# Patient Record
Sex: MALE | Race: WHITE | Employment: OTHER | URBAN - METROPOLITAN AREA
[De-identification: names, ages, dates, MRNs, and addresses within clinical notes are randomized per-mention and may not be internally consistent; named-entity substitution may affect disease eponyms.]

---

## 2019-07-08 ENCOUNTER — OFFICE VISIT (OUTPATIENT)
Dept: URGENT CARE | Facility: CLINIC | Age: 69
End: 2019-07-08
Payer: MEDICARE

## 2019-07-08 VITALS
SYSTOLIC BLOOD PRESSURE: 128 MMHG | HEART RATE: 72 BPM | WEIGHT: 219 LBS | OXYGEN SATURATION: 95 % | BODY MASS INDEX: 32.44 KG/M2 | RESPIRATION RATE: 18 BRPM | DIASTOLIC BLOOD PRESSURE: 80 MMHG | TEMPERATURE: 99 F | HEIGHT: 69 IN

## 2019-07-08 DIAGNOSIS — B02.31 HERPES ZOSTER CONJUNCTIVITIS: Primary | ICD-10-CM

## 2019-07-08 PROCEDURE — 99204 OFFICE O/P NEW MOD 45 MIN: CPT | Performed by: PHYSICIAN ASSISTANT

## 2019-07-08 RX ORDER — VALACYCLOVIR HYDROCHLORIDE 1 G/1
1000 TABLET, FILM COATED ORAL 3 TIMES DAILY
Qty: 21 TAB | Refills: 0 | Status: SHIPPED | OUTPATIENT
Start: 2019-07-08 | End: 2019-07-15

## 2019-07-08 RX ORDER — PREDNISONE 20 MG/1
TABLET ORAL
Qty: 23 TAB | Refills: 0 | Status: SHIPPED | OUTPATIENT
Start: 2019-07-08 | End: 2020-12-09

## 2019-07-08 RX ORDER — ATORVASTATIN CALCIUM 10 MG/1
10 TABLET, FILM COATED ORAL NIGHTLY
COMMUNITY
End: 2019-10-24

## 2019-07-08 RX ORDER — CEFUROXIME AXETIL 250 MG/1
250 TABLET ORAL 2 TIMES DAILY
COMMUNITY
End: 2020-12-09

## 2019-07-08 NOTE — PATIENT INSTRUCTIONS
Shingles  Shingles is an infection that causes a painful skin rash and fluid-filled blisters. Shingles is caused by the same virus that causes chickenpox.  Shingles only develops in people who:  · Have had chickenpox.  · Have gotten the chickenpox vaccine. (This is rare.)  The first symptoms of shingles may be itching, tingling, or pain in an area on your skin. A rash will follow in a few days or weeks. The rash is usually on one side of the body in a bandlike or beltlike pattern. Over time, the rash turns into fluid-filled blisters that break open, scab over, and dry up. Medicines may:  · Help you manage pain.  · Help you recover more quickly.  · Help to prevent long-term problems.  Follow these instructions at home:  Medicines  · Take medicines only as told by your doctor.  · Apply an anti-itch or numbing cream to the affected area as told by your doctor.  Blister and Rash Care  · Take a cool bath or put cool compresses on the area of the rash or blisters as told by your doctor. This may help with pain and itching.  · Keep your rash covered with a loose bandage (dressing). Wear loose-fitting clothing.  · Keep your rash and blisters clean with mild soap and cool water or as told by your doctor.  · Check your rash every day for signs of infection. These include redness, swelling, and pain that lasts or gets worse.  · Do not pick your blisters.  · Do not scratch your rash.  General instructions  · Rest as told by your doctor.  · Keep all follow-up visits as told by your doctor. This is important.  · Until your blisters scab over, your infection can cause chickenpox in people who have never had it or been vaccinated against it. To prevent this from happening, avoid touching other people or being around other people, especially:  ¨ Babies.  ¨ Pregnant women.  ¨ Children who have eczema.  ¨ Elderly people who have transplants.  ¨ People who have chronic illnesses, such as leukemia or AIDS.  Contact a doctor if:  · Your  pain does not get better with medicine.  · Your pain does not get better after the rash heals.  · Your rash looks infected. Signs of infection include:  ¨ Redness.  ¨ Swelling.  ¨ Pain that lasts or gets worse.  Get help right away if:  · The rash is on your face or nose.  · You have pain in your face, pain around your eye area, or loss of feeling on one side of your face.  · You have ear pain or you have ringing in your ear.  · You have loss of taste.  · Your condition gets worse.  This information is not intended to replace advice given to you by your health care provider. Make sure you discuss any questions you have with your health care provider.  Document Released: 06/05/2009 Document Revised: 08/13/2017 Document Reviewed: 09/29/2015  [employer] Interactive Patient Education © 2017 [employer]

## 2019-07-08 NOTE — PROGRESS NOTES
Subjective:      Pt is a 69 y.o. male who presents with Allergic Reaction (Cuple days rash on forehead and scalp)            HPI  This is a new problem. Pt notes red rash on left forehead and scalp x 3 days which burns and is affecting his left eye. Pt states the pain before the rash was 5 days ago and he took antibiotics for an ear infection and was thinking this rash was due to an allergy to the medication though he has had the meds in the past without issue. Pt has not taken any Rx medications for this condition. Pt states the pain is a 6/10, aching in nature and worse at night. Pt denies new detergents, soaps, make-up, hygiene products, foods, exposure to chemicals.  Pt denies CP, SOB, NVD, paresthesias, headaches, dizziness, change in vision, hives, or other joint pain. The pt's medication list, problem list, and allergies have been evaluated and reviewed during today's visit.    PMH:  Negative per pt.      PSH:  Negative per pt.      Fam Hx:  the patient's family history is not pertinent to their current complaint    Soc HX:  Social History     Social History   • Marital status:      Spouse name: N/A   • Number of children: N/A   • Years of education: N/A     Occupational History   • Not on file.     Social History Main Topics   • Smoking status: Former Smoker     Start date: 1/8/1970   • Smokeless tobacco: Never Used   • Alcohol use Not on file   • Drug use: Unknown   • Sexual activity: Not on file     Other Topics Concern   • Not on file     Social History Narrative   • No narrative on file         Medications:    Current Outpatient Prescriptions:   •  cefUROXime (CEFTIN) 250 MG Tab, Take 250 mg by mouth 2 times a day., Disp: , Rfl:   •  atorvastatin (LIPITOR) 10 MG Tab, Take 10 mg by mouth every evening., Disp: , Rfl:   •  predniSONE (DELTASONE) 20 MG Tab, Take 3 tabs at once PO daily x 5 days, then take 2 tabs at once daily x 3 days, then take 1 tab PO daily x 2 days, Disp: 23 Tab, Rfl: 0  •   "valacyclovir (VALTREX) 1 GM Tab, Take 1 Tab by mouth 3 times a day for 7 days., Disp: 21 Tab, Rfl: 0      Allergies:  Patient has no known allergies.    ROS    Constitutional: Negative for fever, chills and malaise/fatigue.   HENT: Negative for congestion and sore throat.    Eyes: Negative for blurred vision, double vision and photophobia.   Respiratory: Negative for cough and shortness of breath.  Cardiovascular: Negative for chest pain and palpitations.   Gastrointestinal: Negative for heartburn, nausea, vomiting, abdominal pain, diarrhea and constipation.   Genitourinary: Negative for dysuria and flank pain.   Musculoskeletal: Negative for joint pain and myalgias.   Skin: +left forehead and scalp red rash  Neurological: Negative for dizziness, tingling and headaches.   Endo/Heme/Allergies: Does not bruise/bleed easily.   Psychiatric/Behavioral: Negative for depression. The patient is not nervous/anxious.         Objective:     /80   Pulse 72   Temp 37.2 °C (99 °F) (Temporal)   Resp 18   Ht 1.753 m (5' 9\")   Wt 99.3 kg (219 lb)   SpO2 95%   BMI 32.34 kg/m²      Physical Exam   HENT:   Head: Normocephalic. Head is with left periorbital erythema.       Eyes: Pupils are equal, round, and reactive to light. EOM are normal. Left conjunctiva is injected.       Skin: Skin is warm. Capillary refill takes less than 2 seconds. Rash noted. Rash is vesicular. There is erythema.              Constitutional: PT is oriented to person, place, and time. PT appears well-developed and well-nourished. No distress.   HENT:   Head: Normocephalic and atraumatic.   Mouth/Throat: Oropharynx is clear and moist. No oropharyngeal exudate.   Neck: Normal range of motion. Neck supple. No thyromegaly present.   Cardiovascular: Normal rate, regular rhythm, normal heart sounds and intact distal pulses.  Exam reveals no gallop and no friction rub.    No murmur heard.  Pulmonary/Chest: Effort normal and breath sounds normal. No " respiratory distress. PT has no wheezes. PT has no rales. Pt exhibits no tenderness.   Abdominal: Soft. Bowel sounds are normal. PT exhibits no distension and no mass. There is no tenderness. There is no rebound and no guarding.   Musculoskeletal: Normal range of motion. PT exhibits no edema and no tenderness.   Neurological: PT is alert and oriented to person, place, and time. PT has normal reflexes. No cranial nerve deficit.       Psychiatric: PT has a normal mood and affect. PT behavior is normal. Judgment and thought content normal.          Assessment/Plan:     1. Herpes zoster conjunctivitis    - predniSONE (DELTASONE) 20 MG Tab; Take 3 tabs at once PO daily x 5 days, then take 2 tabs at once daily x 3 days, then take 1 tab PO daily x 2 days  Dispense: 23 Tab; Refill: 0  - valacyclovir (VALTREX) 1 GM Tab; Take 1 Tab by mouth 3 times a day for 7 days.  Dispense: 21 Tab; Refill: 0  - REFERRAL TO OPHTHALMOLOGY    [facility] on [address] recommended emergent walk-in if left eye becomes worse  STRICT ER precautions given  Rest, fluids encouraged.  AVS with medical info given.  Pt was in full understanding and agreement with the plan.  Differential diagnosis, natural history, supportive care, and indications for immediate follow-up discussed. All questions answered. Patient agrees with the plan of care.  Follow-up as needed if symptoms worsen or fail to improve.

## 2019-10-24 ENCOUNTER — OFFICE VISIT (OUTPATIENT)
Dept: MEDICAL GROUP | Facility: PHYSICIAN GROUP | Age: 69
End: 2019-10-24
Payer: MEDICARE

## 2019-10-24 VITALS
HEIGHT: 69 IN | WEIGHT: 211.8 LBS | TEMPERATURE: 98.1 F | DIASTOLIC BLOOD PRESSURE: 70 MMHG | HEART RATE: 69 BPM | RESPIRATION RATE: 16 BRPM | BODY MASS INDEX: 31.37 KG/M2 | SYSTOLIC BLOOD PRESSURE: 118 MMHG | OXYGEN SATURATION: 97 %

## 2019-10-24 DIAGNOSIS — N40.1 BPH ASSOCIATED WITH NOCTURIA: ICD-10-CM

## 2019-10-24 DIAGNOSIS — E55.9 VITAMIN D DEFICIENCY: ICD-10-CM

## 2019-10-24 DIAGNOSIS — R35.1 BPH ASSOCIATED WITH NOCTURIA: ICD-10-CM

## 2019-10-24 DIAGNOSIS — M54.50 LUMBAR PAIN: ICD-10-CM

## 2019-10-24 DIAGNOSIS — Z23 NEED FOR VACCINATION: ICD-10-CM

## 2019-10-24 DIAGNOSIS — I15.9 SECONDARY HYPERTENSION: ICD-10-CM

## 2019-10-24 DIAGNOSIS — M53.3 SACRAL BACK PAIN: ICD-10-CM

## 2019-10-24 DIAGNOSIS — Z11.59 NEED FOR HEPATITIS C SCREENING TEST: ICD-10-CM

## 2019-10-24 DIAGNOSIS — E78.5 HYPERLIPIDEMIA, UNSPECIFIED HYPERLIPIDEMIA TYPE: ICD-10-CM

## 2019-10-24 PROBLEM — I10 HTN (HYPERTENSION): Status: ACTIVE | Noted: 2019-10-24

## 2019-10-24 PROCEDURE — 99214 OFFICE O/P EST MOD 30 MIN: CPT | Performed by: NURSE PRACTITIONER

## 2019-10-24 RX ORDER — ATORVASTATIN CALCIUM 20 MG/1
20 TABLET, FILM COATED ORAL NIGHTLY
COMMUNITY
End: 2020-10-01

## 2019-10-24 RX ORDER — TAMSULOSIN HYDROCHLORIDE 0.4 MG/1
0.4 CAPSULE ORAL
COMMUNITY
End: 2020-12-15

## 2019-10-24 SDOH — HEALTH STABILITY: MENTAL HEALTH: HOW OFTEN DO YOU HAVE A DRINK CONTAINING ALCOHOL?: MONTHLY OR LESS

## 2019-10-24 ASSESSMENT — PATIENT HEALTH QUESTIONNAIRE - PHQ9: CLINICAL INTERPRETATION OF PHQ2 SCORE: 0

## 2019-10-24 NOTE — LETTER
[facility]  Pcp Pt States None  No address on file  Fax: [phone]   Authorization for Release/Disclosure of   Protected Health Information   Name: PRABHA MONTANA : 1950 SSN: [id number]   [address] [place] [address] Phone:    [phone] (home)    I authorize the entity listed below to release/disclose the PHI below to:   [facility]/Pcp Pt States None and BILL Butterfield   Provider or Entity Name:     Address   City, State, Zip   Phone:      Fax:     Reason for request: continuity of care   Information to be released:    [  ] LAST COLONOSCOPY,  including any PATH REPORT and follow-up  [  ] LAST FIT/COLOGUARD RESULT [  ] LAST DEXA  [  ] LAST MAMMOGRAM  [  ] LAST PAP  [  ] LAST LABS [  ] RETINA EXAM REPORT  [  ] IMMUNIZATION RECORDS  [  ] Release all info      [  ] Check here and initial the line next to each item to release ALL health information INCLUDING  _____ Care and treatment for drug and / or alcohol abuse  _____ HIV testing, infection status, or AIDS  _____ Genetic Testing    DATES OF SERVICE OR TIME PERIOD TO BE DISCLOSED: _____________  I understand and acknowledge that:  * This Authorization may be revoked at any time by you in writing, except if your health information has already been used or disclosed.  * Your health information that will be used or disclosed as a result of you signing this authorization could be re-disclosed by the recipient. If this occurs, your re-disclosed health information may no longer be protected by State or Federal laws.  * You may refuse to sign this Authorization. Your refusal will not affect your ability to obtain treatment.  * This Authorization becomes effective upon signing and will  on (date) __________.      If no date is indicated, this Authorization will  one (1) year from the signature date.    Name: Prabha Montana    Signature:   Date:     10/10/2019       PLEASE FAX REQUESTED RECORDS BACK TO: [phone]  [phone]

## 2019-10-24 NOTE — LETTER
[facility]  Pcp Pt States None  No address on file  Fax: [phone]   Authorization for Release/Disclosure of   Protected Health Information   Name: PRABHA MONTANA : 1950 SSN: [id number]   [address] [place] [address] Phone:    [phone] (home)    I authorize the entity listed below to release/disclose the PHI below to:   [facility]/Pcp Pt States None and BILL Butterfield   Provider or Entity Name:     Address   City, State, Zip   Phone:      Fax:     Reason for request: continuity of care   Information to be released:    [  ] LAST COLONOSCOPY,  including any PATH REPORT and follow-up  [  ] LAST FIT/COLOGUARD RESULT [  ] LAST DEXA  [  ] LAST MAMMOGRAM  [  ] LAST PAP  [  ] LAST LABS [  ] RETINA EXAM REPORT  [  ] IMMUNIZATION RECORDS  [  ] Release all info      [  ] Check here and initial the line next to each item to release ALL health information INCLUDING  _____ Care and treatment for drug and / or alcohol abuse  _____ HIV testing, infection status, or AIDS  _____ Genetic Testing    DATES OF SERVICE OR TIME PERIOD TO BE DISCLOSED: _____________  I understand and acknowledge that:  * This Authorization may be revoked at any time by you in writing, except if your health information has already been used or disclosed.  * Your health information that will be used or disclosed as a result of you signing this authorization could be re-disclosed by the recipient. If this occurs, your re-disclosed health information may no longer be protected by State or Federal laws.  * You may refuse to sign this Authorization. Your refusal will not affect your ability to obtain treatment.  * This Authorization becomes effective upon signing and will  on (date) __________.      If no date is indicated, this Authorization will  one (1) year from the signature date.    Name: Prabha Montana    Signature:   Date:     10/10/2019       PLEASE FAX REQUESTED RECORDS BACK TO: [phone]  [phone]
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
## 2019-11-06 ENCOUNTER — HOSPITAL ENCOUNTER (OUTPATIENT)
Dept: LAB | Facility: MEDICAL CENTER | Age: 69
End: 2019-11-06
Attending: NURSE PRACTITIONER
Payer: MEDICARE

## 2019-11-06 ENCOUNTER — HOSPITAL ENCOUNTER (OUTPATIENT)
Dept: RADIOLOGY | Facility: MEDICAL CENTER | Age: 69
End: 2019-11-06
Attending: NURSE PRACTITIONER
Payer: MEDICARE

## 2019-11-06 DIAGNOSIS — I15.9 SECONDARY HYPERTENSION: ICD-10-CM

## 2019-11-06 DIAGNOSIS — E78.5 HYPERLIPIDEMIA, UNSPECIFIED HYPERLIPIDEMIA TYPE: ICD-10-CM

## 2019-11-06 DIAGNOSIS — E55.9 VITAMIN D DEFICIENCY: ICD-10-CM

## 2019-11-06 DIAGNOSIS — M54.50 LUMBAR PAIN: ICD-10-CM

## 2019-11-06 DIAGNOSIS — Z11.59 NEED FOR HEPATITIS C SCREENING TEST: ICD-10-CM

## 2019-11-06 LAB
25(OH)D3 SERPL-MCNC: 36 NG/ML (ref 30–100)
ALBUMIN SERPL BCP-MCNC: 4.2 G/DL (ref 3.2–4.9)
ALBUMIN/GLOB SERPL: 1.6 G/DL
ALP SERPL-CCNC: 116 U/L (ref 30–99)
ALT SERPL-CCNC: 16 U/L (ref 2–50)
ANION GAP SERPL CALC-SCNC: 8 MMOL/L (ref 0–11.9)
AST SERPL-CCNC: 15 U/L (ref 12–45)
BASOPHILS # BLD AUTO: 0.9 % (ref 0–1.8)
BASOPHILS # BLD: 0.08 K/UL (ref 0–0.12)
BILIRUB SERPL-MCNC: 0.8 MG/DL (ref 0.1–1.5)
BUN SERPL-MCNC: 20 MG/DL (ref 8–22)
CALCIUM SERPL-MCNC: 9.2 MG/DL (ref 8.5–10.5)
CHLORIDE SERPL-SCNC: 104 MMOL/L (ref 96–112)
CHOLEST SERPL-MCNC: 147 MG/DL (ref 100–199)
CO2 SERPL-SCNC: 28 MMOL/L (ref 20–33)
CREAT SERPL-MCNC: 1.14 MG/DL (ref 0.5–1.4)
EOSINOPHIL # BLD AUTO: 0.23 K/UL (ref 0–0.51)
EOSINOPHIL NFR BLD: 2.6 % (ref 0–6.9)
ERYTHROCYTE [DISTWIDTH] IN BLOOD BY AUTOMATED COUNT: 40.8 FL (ref 35.9–50)
FASTING STATUS PATIENT QL REPORTED: NORMAL
GLOBULIN SER CALC-MCNC: 2.7 G/DL (ref 1.9–3.5)
GLUCOSE SERPL-MCNC: 97 MG/DL (ref 65–99)
HCT VFR BLD AUTO: 48.3 % (ref 42–52)
HCV AB SER QL: NEGATIVE
HDLC SERPL-MCNC: 44 MG/DL
HGB BLD-MCNC: 16.1 G/DL (ref 14–18)
IMM GRANULOCYTES # BLD AUTO: 0.06 K/UL (ref 0–0.11)
IMM GRANULOCYTES NFR BLD AUTO: 0.7 % (ref 0–0.9)
LDLC SERPL CALC-MCNC: 74 MG/DL
LYMPHOCYTES # BLD AUTO: 1.5 K/UL (ref 1–4.8)
LYMPHOCYTES NFR BLD: 16.9 % (ref 22–41)
MCH RBC QN AUTO: 30 PG (ref 27–33)
MCHC RBC AUTO-ENTMCNC: 33.3 G/DL (ref 33.7–35.3)
MCV RBC AUTO: 89.9 FL (ref 81.4–97.8)
MONOCYTES # BLD AUTO: 0.87 K/UL (ref 0–0.85)
MONOCYTES NFR BLD AUTO: 9.8 % (ref 0–13.4)
NEUTROPHILS # BLD AUTO: 6.11 K/UL (ref 1.82–7.42)
NEUTROPHILS NFR BLD: 69.1 % (ref 44–72)
NRBC # BLD AUTO: 0 K/UL
NRBC BLD-RTO: 0 /100 WBC
PLATELET # BLD AUTO: 200 K/UL (ref 164–446)
PMV BLD AUTO: 10.9 FL (ref 9–12.9)
POTASSIUM SERPL-SCNC: 4.2 MMOL/L (ref 3.6–5.5)
PROT SERPL-MCNC: 6.9 G/DL (ref 6–8.2)
RBC # BLD AUTO: 5.37 M/UL (ref 4.7–6.1)
SODIUM SERPL-SCNC: 140 MMOL/L (ref 135–145)
T4 FREE SERPL-MCNC: 0.87 NG/DL (ref 0.53–1.43)
TRIGL SERPL-MCNC: 143 MG/DL (ref 0–149)
TSH SERPL DL<=0.005 MIU/L-ACNC: 1.98 UIU/ML (ref 0.38–5.33)
WBC # BLD AUTO: 8.9 K/UL (ref 4.8–10.8)

## 2019-11-06 PROCEDURE — 36415 COLL VENOUS BLD VENIPUNCTURE: CPT

## 2019-11-06 PROCEDURE — 80061 LIPID PANEL: CPT

## 2019-11-06 PROCEDURE — 72220 X-RAY EXAM SACRUM TAILBONE: CPT

## 2019-11-06 PROCEDURE — 84443 ASSAY THYROID STIM HORMONE: CPT

## 2019-11-06 PROCEDURE — 86803 HEPATITIS C AB TEST: CPT

## 2019-11-06 PROCEDURE — 80053 COMPREHEN METABOLIC PANEL: CPT

## 2019-11-06 PROCEDURE — 83036 HEMOGLOBIN GLYCOSYLATED A1C: CPT | Mod: GA

## 2019-11-06 PROCEDURE — 82306 VITAMIN D 25 HYDROXY: CPT

## 2019-11-06 PROCEDURE — 85025 COMPLETE CBC W/AUTO DIFF WBC: CPT

## 2019-11-06 PROCEDURE — 84439 ASSAY OF FREE THYROXINE: CPT

## 2019-11-07 ENCOUNTER — TELEPHONE (OUTPATIENT)
Dept: MEDICAL GROUP | Facility: PHYSICIAN GROUP | Age: 69
End: 2019-11-07

## 2019-11-07 LAB
EST. AVERAGE GLUCOSE BLD GHB EST-MCNC: 120 MG/DL
HBA1C MFR BLD: 5.8 % (ref 0–5.6)

## 2019-11-08 NOTE — TELEPHONE ENCOUNTER
----- Message from BILL Butterfield sent at 11/7/2019 12:56 PM PST -----  Please let patient know I reviewed his labs and they will look good, except slight elevation in A1c which is 5.8 (DM factor), which is an acceptable number for this age.  If patient has additional questions about his labs, please schedule an appointment for follow-up for detailed lab review.  Thank you

## 2019-11-08 NOTE — TELEPHONE ENCOUNTER
Called and LM informing patient of his lab results.advised him if he had any further questions to feel free to call back and set up an appointment to be seen so that these results could be discussed in more detail.

## 2019-11-13 ENCOUNTER — OFFICE VISIT (OUTPATIENT)
Dept: MEDICAL GROUP | Facility: PHYSICIAN GROUP | Age: 69
End: 2019-11-13
Payer: MEDICARE

## 2019-11-13 VITALS
DIASTOLIC BLOOD PRESSURE: 50 MMHG | TEMPERATURE: 98.7 F | WEIGHT: 215 LBS | HEART RATE: 71 BPM | HEIGHT: 69 IN | SYSTOLIC BLOOD PRESSURE: 130 MMHG | OXYGEN SATURATION: 97 % | BODY MASS INDEX: 31.84 KG/M2

## 2019-11-13 DIAGNOSIS — M53.3 SACRAL BACK PAIN: ICD-10-CM

## 2019-11-13 DIAGNOSIS — R73.09 ELEVATED HEMOGLOBIN A1C: ICD-10-CM

## 2019-11-13 DIAGNOSIS — E55.9 VITAMIN D DEFICIENCY: ICD-10-CM

## 2019-11-13 DIAGNOSIS — J30.2 SEASONAL ALLERGIC RHINITIS, UNSPECIFIED TRIGGER: ICD-10-CM

## 2019-11-13 PROCEDURE — 99214 OFFICE O/P EST MOD 30 MIN: CPT | Performed by: NURSE PRACTITIONER

## 2019-11-13 RX ORDER — ERGOCALCIFEROL 1.25 MG/1
50000 CAPSULE ORAL
Qty: 12 CAP | Refills: 0 | Status: SHIPPED | OUTPATIENT
Start: 2019-11-13 | End: 2020-02-06

## 2019-11-13 NOTE — ASSESSMENT & PLAN NOTE
Reviewed recent labs, noted decreased vitamin D at 36, lower end of normal.  Patient on the supplement.

## 2019-11-13 NOTE — ASSESSMENT & PLAN NOTE
New problem.  Runny nose for 2 weeks, associated symptoms are postnasal drip, nasal congestion, and sore throat.  Patient using OTC Flonase and Benadryl for sleep which is helpful.  He denies fevers, ear pain, chest pain, coug, dyspnea, headaches, dizziness, NVD, sinus pain or congestion.  No sick contacts.  States he feels slightly better.

## 2019-11-13 NOTE — PROGRESS NOTES
Chief Complaint   Patient presents with   • Follow-Up     FV labs       HISTORY OF PRESENT ILLNESS: Patient is a 69 y.o. male, established patient who presents today to discuss medical problems as listed below:    Health Maintenance:  COMPLETED.    Vitamin D deficiency  Reviewed recent labs, noted decreased vitamin D at 36, lower end of normal.  Patient on the supplement.    Elevated hemoglobin A1c  Reviewed recent labs, noted that the elevated A1c at 5.8.    Seasonal allergic rhinitis  New problem.  Runny nose for 2 weeks, associated symptoms are postnasal drip, nasal congestion, and sore throat.  Patient using OTC Flonase and Benadryl for sleep which is helpful.  He denies fevers, ear pain, chest pain, coug, dyspnea, headaches, dizziness, NVD, sinus pain or congestion.  No sick contacts.  States he feels slightly better.      Patient Active Problem List    Diagnosis Date Noted   • Elevated hemoglobin A1c 11/13/2019   • Seasonal allergic rhinitis 11/13/2019   • BPH associated with nocturia 10/24/2019   • Sacral back pain 10/24/2019   • Hyperlipidemia 10/24/2019   • HTN (hypertension) 10/24/2019   • Vitamin D deficiency 10/24/2019        Allergies: Patient has no known allergies.    Current Outpatient Medications   Medication Sig Dispense Refill   • vitamin D, Ergocalciferol, (DRISDOL) 92997 units Cap capsule Take 1 Cap by mouth every 7 days. 12 Cap 0   • atorvastatin (LIPITOR) 20 MG Tab Take 20 mg by mouth every evening.     • tamsulosin (FLOMAX) 0.4 MG capsule Take 0.4 mg by mouth ONE-HALF HOUR AFTER BREAKFAST.     • cefUROXime (CEFTIN) 250 MG Tab Take 250 mg by mouth 2 times a day.     • predniSONE (DELTASONE) 20 MG Tab Take 3 tabs at once PO daily x 5 days, then take 2 tabs at once daily x 3 days, then take 1 tab PO daily x 2 days (Patient not taking: Reported on 11/13/2019) 23 Tab 0     No current facility-administered medications for this visit.        Social History     Tobacco Use   • Smoking status: Former  "Smoker     Start date: 1/8/1970   • Smokeless tobacco: Never Used   • Tobacco comment: 0.5 x 5 yrs, stopped at age 26   Substance Use Topics   • Alcohol use: Yes     Frequency: Monthly or less   • Drug use: Never     Social History     Patient does not qualify to have social determinant information on file (likely too young).   Social History Narrative   • Not on file       Family History   Problem Relation Age of Onset   • Cancer Mother 40        br ca   • Heart Disease Mother    • Cancer Father    • Cancer Brother 61        brain ca       Allergies, past medical history, past surgical history, family history, social history reviewed and updated.    Review of Systems:      - Constitutional: Negative for fever, chills, unexpected weight change, and fatigue/generalized weakness.     - HEENT: Positive for rhinorrhea and sore throat.  Negative for headaches, vision changes, hearing changes, ear pain, ear discharge,sinus congestion, and neck pain.      - Respiratory: Negative for cough, sputum production, chest congestion, dyspnea, wheezing, and crackles.      - Cardiovascular: Negative for chest pain, palpitations, orthopnea, and bilateral lower extremity edema.    All other systems reviewed and are negative    Exam:    /50 (BP Location: Right arm, Patient Position: Sitting, BP Cuff Size: Large adult)   Pulse 71   Temp 37.1 °C (98.7 °F) (Temporal)   Ht 1.753 m (5' 9\")   Wt 97.5 kg (215 lb)   SpO2 97%   BMI 31.75 kg/m²  Body mass index is 31.75 kg/m².    Physical Exam:  Constitutional: Well-developed and well-nourished. Not diaphoretic. No distress.   Ears:  External ears unremarkable. Tympanic membranes clear and intact.  Nose: Turbinates with mld erythema,  no edema. No discharge.   Mouth/Throat:  Posterior pharynx with mild erythema, no exudates.  Neck: No lymphadenopathy--cervical or supraclavicular.  Cardiovascular: Regular rate and rhythm, S1 and S2 without murmur, rubs, or gallops.    Chest: Effort " normal. Clear to auscultation throughout. No adventitious sounds.     LABS: 11/6  results reviewed and discussed with the patient, questions answered.  Imaging:     Patient was seen for 25 minutes face to face of which > 50% of appointment time was spent on counseling and coordination of care regarding the above.  Discussed discussed supportive care, prevention for URI.    Assessment/Plan:  1. Vitamin D deficiency  Stable.  After Rx completion, take OTC vitamin D3 5000 IUs daily.  - vitamin D, Ergocalciferol, (DRISDOL) 12834 units Cap capsule; Take 1 Cap by mouth every 7 days.  Dispense: 12 Cap; Refill: 0    2. Sacral back pain  Reviewed x-ray, unremarkable. Patient to follow-up with PT.  Will consider further work-up if not feeling better.  Commend OTC menthol gel and capsaicin gel applied to the area.   - REFERRAL TO PHYSICAL THERAPY Reason for Therapy: Eval/Treat/Report    3. Elevated hemoglobin A1c  Stable.  Discussed healthy lifestyle, including exercise and healthy nutrition.    4. Seasonal allergic rhinitis, unspecified trigger  Improving.  Suspecting allergic versus viral etiology.  Patient is getting better, no need for antibiotic therapy.  Encourage supportive care such as increased rest, increase hydration, recommend the use of local honey.  Also recommend OTC vitamin C and zinc.  Continue to use Flonase PRN.  Also recommend use of humidifier.    Discussed with patient possible alternative diagnoses, pt is to take all medications as prescribed. If symptoms persist FU w/PCP, if symptoms worsen go to emergency room. If experiencing any side effects from prescribed medications reports to the office immediately or go to emergency room.  Reviewed indication, dosage, usage and potential adverse effects of prescribed medications. Reviewed risks and benefits of treatment plan. Patient verbalizes understanding of all instruction and verbally agrees to plan.    Return if symptoms worsen or fail to improve.

## 2019-12-05 ENCOUNTER — PHYSICAL THERAPY (OUTPATIENT)
Dept: PHYSICAL THERAPY | Facility: MEDICAL CENTER | Age: 69
End: 2019-12-05
Attending: FAMILY MEDICINE
Payer: MEDICARE

## 2019-12-05 DIAGNOSIS — M53.3 SACRAL BACK PAIN: ICD-10-CM

## 2019-12-05 PROCEDURE — 97110 THERAPEUTIC EXERCISES: CPT

## 2019-12-05 PROCEDURE — 97162 PT EVAL MOD COMPLEX 30 MIN: CPT

## 2019-12-05 ASSESSMENT — ENCOUNTER SYMPTOMS
QUALITY: ACHING
PAIN TIMING: INTERMITTENT
PAIN SCALE: 3
QUALITY: TIGHTNESS
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
## 2019-12-09 ENCOUNTER — APPOINTMENT (OUTPATIENT)
Dept: PHYSICAL THERAPY | Facility: MEDICAL CENTER | Age: 69
End: 2019-12-09
Payer: MEDICARE

## 2019-12-12 ENCOUNTER — PHYSICAL THERAPY (OUTPATIENT)
Dept: PHYSICAL THERAPY | Facility: MEDICAL CENTER | Age: 69
End: 2019-12-12
Attending: FAMILY MEDICINE
Payer: MEDICARE

## 2019-12-12 DIAGNOSIS — M53.3 SACRAL BACK PAIN: ICD-10-CM

## 2019-12-12 PROCEDURE — 97110 THERAPEUTIC EXERCISES: CPT

## 2019-12-12 PROCEDURE — 97140 MANUAL THERAPY 1/> REGIONS: CPT

## 2019-12-12 NOTE — OP THERAPY DAILY TREATMENT
Outpatient Physical Therapy  DAILY TREATMENT     [facility] Outpatient Physical Therapy  [address]  [place] [address]  Phone:  [phone]  Fax:  [phone]    Date: 12/12/2019    Patient: Ravindra Raines  YOB: 1950  MRN: [id number]     Time Calculation  Start time: 1300  Stop time: 1330 Time Calculation (min): 30 minutes       Chief Complaint: Back Problem    Visit #: 2    SUBJECTIVE:  Ravindra presents today for eval of central low back pain Review his exercises.     OBJECTIVE:  Current objective measures:           Therapeutic Exercises (CPT 93898):     1. Review HEP     2. Cat camel    3. Door way low back stretch      Therapeutic Exercise Summary: Exercises  ·           Supine Posterior Pelvic Tilt - 10 reps - 1 sets - 1x daily - 5x weekly  ·           Hooklying Isometric Hip Flexion - 5 reps - 5 hold - 1x daily - 5x weekly  ·           Supine Hamstring Stretch - 10 reps - 1 sets - 1x daily - 5x weekly  ·           Prone Press Up on Elbows - 10 reps - 1 sets - 1x daily - 5x weekly  ·           Cat-Camel - 10 reps - 1 sets - 1x daily - 5x weekly  ·           Hip Flexor Stretch with Chair - 10 reps - 1 sets - 1x daily - 5x weekly       Therapeutic Treatments and Modalities:     1. Manual Therapy (CPT 70339), Low back, IASTM to low back, PA mobs to low back     2. Hot or Cold Pack Therapy (CPT 00885), Low back     Time-based treatments/modalities:  Manual therapy minutes (CPT 99465): 20 minutes  Therapeutic exercise minutes (CPT 36333): 10 minutes     ASSESSMENT:   Response to treatment: See how he does with manual work and early hip stab    PLAN/RECOMMENDATIONS:   Plan for treatment: therapy treatment to continue next visit.  Planned interventions for next visit: continue with current treatment.

## 2019-12-17 ENCOUNTER — PHYSICAL THERAPY (OUTPATIENT)
Dept: PHYSICAL THERAPY | Facility: MEDICAL CENTER | Age: 69
End: 2019-12-17
Attending: FAMILY MEDICINE
Payer: MEDICARE

## 2019-12-17 DIAGNOSIS — M53.3 SACRAL BACK PAIN: ICD-10-CM

## 2019-12-17 PROCEDURE — 97110 THERAPEUTIC EXERCISES: CPT

## 2019-12-17 PROCEDURE — 97140 MANUAL THERAPY 1/> REGIONS: CPT

## 2019-12-17 NOTE — OP THERAPY DAILY TREATMENT
Outpatient Physical Therapy  DAILY TREATMENT     [facility] Outpatient Physical Therapy  [address]  [place] [address]  Phone:  [phone]  Fax:  [phone]    Date: 12/17/2019    Patient: Ravindra Raines  YOB: 1950  MRN: [id number]     Time Calculation  Start time: 1030  Stop time: 1100 Time Calculation (min): 30 minutes       Chief Complaint: Back Problem    Visit #: 3    SUBJECTIVE:  Ravindra presents today for follow up on central low back pain     OBJECTIVE:  Current objective measures:           Therapeutic Exercises (CPT 76825):     2. Cat camel    3. Easy banded walk , orange band 5-7 step x 3-4    4. Quadruped ext bird dog    5. Quadruped T spine rotatiom      Therapeutic Exercise Summary: Exercises  ·           Supine Posterior Pelvic Tilt - 10 reps - 1 sets - 1x daily - 5x weekly  ·           Hooklying Isometric Hip Flexion - 5 reps - 5 hold - 1x daily - 5x weekly  ·           Supine Hamstring Stretch - 10 reps - 1 sets - 1x daily - 5x weekly  ·           Prone Press Up on Elbows - 10 reps - 1 sets - 1x daily - 5x weekly  ·           Cat-Camel - 10 reps - 1 sets - 1x daily - 5x weekly  ·           Hip Flexor Stretch with Chair - 10 reps - 1 sets - 1x daily - 5x weekly       Therapeutic Treatments and Modalities:     1. Manual Therapy (CPT 54260), Low back, IASTM to low back, PA mobs to low back     2. Hot or Cold Pack Therapy (CPT 96243), Low back     Time-based treatments/modalities:  Manual therapy minutes (CPT 23968): 15 minutes  Therapeutic exercise minutes (CPT 91725): 15 minutes     ASSESSMENT:   Response to treatment: See how he does with manual work and early hip stab. LE hip stab and anti rotation and pallof press.     PLAN/RECOMMENDATIONS:   Plan for treatment: therapy treatment to continue next visit.  Planned interventions for next visit: continue with current treatment.

## 2019-12-20 ENCOUNTER — APPOINTMENT (OUTPATIENT)
Dept: PHYSICAL THERAPY | Facility: MEDICAL CENTER | Age: 69
End: 2019-12-20
Attending: FAMILY MEDICINE
Payer: MEDICARE

## 2020-02-06 DIAGNOSIS — E55.9 VITAMIN D DEFICIENCY: ICD-10-CM

## 2020-02-06 RX ORDER — ERGOCALCIFEROL 1.25 MG/1
CAPSULE ORAL
Qty: 12 CAP | Refills: 0 | Status: SHIPPED | OUTPATIENT
Start: 2020-02-06 | End: 2021-01-19

## 2020-08-17 ENCOUNTER — NURSE TRIAGE (OUTPATIENT)
Dept: HEALTH INFORMATION MANAGEMENT | Facility: OTHER | Age: 70
End: 2020-08-17

## 2020-08-17 NOTE — TELEPHONE ENCOUNTER
"    Answer Assessment - Initial Assessment Questions  1. CLOSE CONTACT: \"Who is the person with the confirmed or suspected COVID-19 infection that you were exposed to?\"      House guest  2. PLACE of CONTACT: \"Where were you when you were exposed to COVID-19?\" (e.g., home, school, medical waiting room; which city?)      Patient's home  3. TYPE of CONTACT: \"How much contact was there?\" (e.g., sitting next to, live in same house, work in same office, same building)      At function in the same home  4. DURATION of CONTACT: \"How long were you in contact with the COVID-19 patient?\" (e.g., a few seconds, passed by person, a few minutes, live with the patient)      unknown  5. DATE of CONTACT: \"When did you have contact with a COVID-19 patient?\" (e.g., how many days ago)      2 days ago  6. TRAVEL: \"Have you traveled out of the country recently?\" If so, \"When and where?\"      * Also ask about out-of-state travel, since the [facility] has identified some high risk cities for community spread in the .      * Note: Travel becomes less relevant if there is widespread community transmission where the patient lives.      No  7. COMMUNITY SPREAD: \"Are there lots of cases of COVID-19 (community spread) where you live?\" (See public health department website, if unsure)    * MAJOR community spread: high number of cases; numbers of cases are increasing; many people hospitalized.    * MINOR community spread: low number of cases; not increasing; few or no people hospitalized      unknown  8. SYMPTOMS: \"Do you have any symptoms?\" (e.g., fever, cough, breathing difficulty)      None  9. PREGNANCY OR POSTPARTUM: \"Is there any chance you are pregnant?\" \"When was your last menstrual period?\" \"Did you deliver in the last 2 weeks?\"      NA  10. HIGH RISK: \"Do you have any heart or lung problems? Do you have a weak immune system?\" (e.g., CHF, COPD, asthma, HIV positive, chemotherapy, renal failure, diabetes mellitus, sickle cell anemia)        " None    Protocols used: CORONAVIRUS (COVID-19) EXPOSURE-A-OH    Patient calling for Covid testing information as a house guest at his house on Saturday has tested positive.  Patient asked about the [facility] Covid testing as that is where his daughter was able to make an appt.  Provided [facility] testing number.

## 2020-10-01 DIAGNOSIS — E78.5 HYPERLIPIDEMIA, UNSPECIFIED HYPERLIPIDEMIA TYPE: ICD-10-CM

## 2020-10-01 DIAGNOSIS — R73.09 ELEVATED HEMOGLOBIN A1C: ICD-10-CM

## 2020-11-09 DIAGNOSIS — E78.5 HYPERLIPIDEMIA, UNSPECIFIED HYPERLIPIDEMIA TYPE: ICD-10-CM

## 2020-11-09 NOTE — TELEPHONE ENCOUNTER
Received request via: Pharmacy    Was the patient seen in the last year in this department? Yes LOV 11/13/2019    Does the patient have an active prescription (recently filled or refills available) for medication(s) requested? No

## 2020-11-10 RX ORDER — ATORVASTATIN CALCIUM 20 MG/1
20 TABLET, FILM COATED ORAL
Qty: 30 TAB | Refills: 0 | Status: SHIPPED | OUTPATIENT
Start: 2020-11-10 | End: 2020-12-01

## 2020-12-01 DIAGNOSIS — E78.5 HYPERLIPIDEMIA, UNSPECIFIED HYPERLIPIDEMIA TYPE: ICD-10-CM

## 2020-12-01 RX ORDER — ATORVASTATIN CALCIUM 20 MG/1
TABLET, FILM COATED ORAL
Qty: 30 TAB | Refills: 1 | Status: SHIPPED | OUTPATIENT
Start: 2020-12-01 | End: 2020-12-29

## 2020-12-09 ENCOUNTER — OFFICE VISIT (OUTPATIENT)
Dept: MEDICAL GROUP | Facility: PHYSICIAN GROUP | Age: 70
End: 2020-12-09
Payer: MEDICARE

## 2020-12-09 VITALS
DIASTOLIC BLOOD PRESSURE: 66 MMHG | HEIGHT: 70 IN | BODY MASS INDEX: 30.35 KG/M2 | OXYGEN SATURATION: 98 % | HEART RATE: 63 BPM | TEMPERATURE: 98.4 F | SYSTOLIC BLOOD PRESSURE: 158 MMHG | WEIGHT: 212 LBS

## 2020-12-09 DIAGNOSIS — I15.9 SECONDARY HYPERTENSION: ICD-10-CM

## 2020-12-09 DIAGNOSIS — Z23 IMMUNIZATION DUE: ICD-10-CM

## 2020-12-09 DIAGNOSIS — R03.0 ELEVATED BP WITHOUT DIAGNOSIS OF HYPERTENSION: ICD-10-CM

## 2020-12-09 DIAGNOSIS — N40.1 BPH ASSOCIATED WITH NOCTURIA: ICD-10-CM

## 2020-12-09 DIAGNOSIS — R01.1 CARDIAC MURMUR: ICD-10-CM

## 2020-12-09 DIAGNOSIS — Z12.11 SCREEN FOR COLON CANCER: ICD-10-CM

## 2020-12-09 DIAGNOSIS — Z12.5 SCREENING FOR PROSTATE CANCER: ICD-10-CM

## 2020-12-09 DIAGNOSIS — E78.5 HYPERLIPIDEMIA, UNSPECIFIED HYPERLIPIDEMIA TYPE: ICD-10-CM

## 2020-12-09 DIAGNOSIS — R35.1 BPH ASSOCIATED WITH NOCTURIA: ICD-10-CM

## 2020-12-09 PROCEDURE — 90471 IMMUNIZATION ADMIN: CPT | Performed by: NURSE PRACTITIONER

## 2020-12-09 PROCEDURE — 90472 IMMUNIZATION ADMIN EACH ADD: CPT | Performed by: NURSE PRACTITIONER

## 2020-12-09 PROCEDURE — 90750 HZV VACC RECOMBINANT IM: CPT | Performed by: NURSE PRACTITIONER

## 2020-12-09 PROCEDURE — 99214 OFFICE O/P EST MOD 30 MIN: CPT | Mod: 25 | Performed by: NURSE PRACTITIONER

## 2020-12-09 PROCEDURE — 90715 TDAP VACCINE 7 YRS/> IM: CPT | Performed by: NURSE PRACTITIONER

## 2020-12-09 RX ORDER — ZOSTER VACCINE RECOMBINANT, ADJUVANTED 50 MCG/0.5
0.5 KIT INTRAMUSCULAR ONCE
Qty: 0.5 ML | Refills: 0 | Status: SHIPPED | OUTPATIENT
Start: 2020-12-09 | End: 2020-12-09

## 2020-12-09 ASSESSMENT — ENCOUNTER SYMPTOMS: GENERAL WELL-BEING: GOOD

## 2020-12-09 ASSESSMENT — ACTIVITIES OF DAILY LIVING (ADL): BATHING_REQUIRES_ASSISTANCE: 0

## 2020-12-09 ASSESSMENT — PATIENT HEALTH QUESTIONNAIRE - PHQ9: CLINICAL INTERPRETATION OF PHQ2 SCORE: 0

## 2020-12-09 ASSESSMENT — FIBROSIS 4 INDEX: FIB4 SCORE: 1.29

## 2020-12-09 NOTE — PROGRESS NOTES
Chief Complaint   Patient presents with   • Medicare Annual Wellness         HPI:  Ravindra is a 70 y.o. here for Medicare Annual Wellness Visit      Patient Active Problem List    Diagnosis Date Noted   • Elevated BP without diagnosis of hypertension 12/09/2020   • Cardiac murmur 12/09/2020   • Elevated hemoglobin A1c 11/13/2019   • Seasonal allergic rhinitis 11/13/2019   • BPH associated with nocturia 10/24/2019   • Sacral back pain 10/24/2019   • Hyperlipidemia 10/24/2019   • HTN (hypertension) 10/24/2019   • Vitamin D deficiency 10/24/2019       Current Outpatient Medications   Medication Sig Dispense Refill   • atorvastatin (LIPITOR) 20 MG Tab TAKE 1 TABLET BY MOUTH EVERY DAY 30 Tab 1   • ergocalciferol (DRISDOL) 03509 UNIT capsule TAKE 1 CAPSULE BY MOUTH EVERY 7 DAYS 12 Cap 0   • tamsulosin (FLOMAX) 0.4 MG capsule Take 0.4 mg by mouth ONE-HALF HOUR AFTER BREAKFAST.       No current facility-administered medications for this visit.         Patient is taking medications as noted in medication list.  Current supplements as per medication list.     Allergies: Patient has no known allergies.    Current social contact/activities: reading , walking, family and friends     Is patient current with immunizations? Yes.    He  reports that he has quit smoking. He started smoking about 50 years ago. He has never used smokeless tobacco. He reports current alcohol use. He reports that he does not use drugs.  Counseling given: Not Answered  Comment: 0.5 x 5 yrs, stopped at age 26        DPA/Advanced directive: Patient does not have an Advanced Directive.  A packet and workshop information was given on Advanced Directives.    ROS:    Gait: Uses no assistive device   Ostomy: No   Other tubes: No   Amputations: No   Chronic oxygen use No   Last eye exam 2019   Wears hearing aids: No   : Denies any urinary leakage during the last 6 months      Screening:    Depression Screening    Little interest or pleasure in doing things?  0 -  not at all  Feeling down, depressed, or hopeless? 0 - not at all  Patient Health Questionnaire Score: 0    If depressive symptoms identified deferred to follow up visit unless specifically addressed in assessment and plan.    Interpretation of PHQ-9 Total Score   Score Severity   1-4 No Depression   5-9 Mild Depression   10-14 Moderate Depression   15-19 Moderately Severe Depression   20-27 Severe Depression    Screening for Cognitive Impairment    Three Minute Recall (river, kayden, finger)  2/3    Zay clock face with all 12 numbers and set the hands to show 10 past 11.  Yes    If cognitive concerns identified, deferred for follow up unless specifically addressed in assessment and plan.    Fall Risk Assessment    Has the patient had two or more falls in the last year or any fall with injury in the last year?  Yes  If fall risk identified, deferred for follow up unless specifically addressed in assessment and plan.    Safety Assessment    Throw rugs on floor.  Yes  Handrails on all stairs.  No  Good lighting in all hallways.  Yes  Difficulty hearing.  Yes  Patient counseled about all safety risks that were identified.    Functional Assessment ADLs    Are there any barriers preventing you from cooking for yourself or meeting nutritional needs?  No.    Are there any barriers preventing you from driving safely or obtaining transportation?  No.    Are there any barriers preventing you from using a telephone or calling for help?  No.    Are there any barriers preventing you from shopping?  No.    Are there any barriers preventing you from taking care of your own finances?  No.    Are there any barriers preventing you from managing your medications?  No.    Are there any barriers preventing you from showering, bathing or dressing yourself?  No.    Are you currently engaging in any exercise or physical activity?  Yes.     What is your perception of your health?  Good.    Health Maintenance Summary                 "COLONOSCOPY Overdue 3/15/2001     IMM ZOSTER VACCINES Next Due 2/3/2021      Done 12/9/2020 Imm Admin: Zoster Vaccine Recombinant (RZV) (SHINGRIX)    IMM PNEUMOCOCCAL VACCINE: 65+ Years Next Due 3/11/2021      Done 3/11/2020 Imm Admin: Pneumococcal Conjugate Vaccine (Prevnar/PCV-13)    IMM DTaP/Tdap/Td Vaccine Next Due 12/9/2030      Done 12/9/2020 Imm Admin: Tdap Vaccine          Patient Care Team:  BILL Butterfield as PCP - General (Family Medicine)  Yovani Barreto, PT, DPT as Physical Therapy (Physical Therapy)    Social History     Tobacco Use   • Smoking status: Former Smoker     Start date: 1/8/1970   • Smokeless tobacco: Never Used   • Tobacco comment: 0.5 x 5 yrs, stopped at age 26   Substance Use Topics   • Alcohol use: Yes     Frequency: Monthly or less   • Drug use: Never     Family History   Problem Relation Age of Onset   • Cancer Mother 40        br ca   • Heart Disease Mother    • Cancer Father    • Cancer Brother 61        brain ca     He  has a past medical history of BPH associated with nocturia.   Past Surgical History:   Procedure Laterality Date   • APPENDECTOMY             Exam:     /66   Pulse 63   Temp 36.9 °C (98.4 °F)   Ht 1.778 m (5' 10\")   Wt 96.2 kg (212 lb)   SpO2 98%  Body mass index is 30.42 kg/m².    Hearing good.    Dentition good  Alert, oriented in no acute distress.  Eye contact is good, speech goal directed, affect calm      Assessment and Plan. The following treatment and monitoring plan is recommended:    1. Elevated BP without diagnosis of hypertension  REFERRAL TO 24-HOUR BLOOD PRESSURE MONITORING   2. Screen for colon cancer  REFERRAL TO GI FOR COLONOSCOPY   3. Hyperlipidemia, unspecified hyperlipidemia type  Comp Metabolic Panel    Lipid Profile   4. BPH associated with nocturia     5. Screening for prostate cancer  PROSTATE SPECIFIC AG SCREENING   6. Cardiac murmur  EC-ECHOCARDIOGRAM COMPLETE W/O CONT   7. Immunization due  TDAP VACCINE =>6YO IM    " Shingles Vaccine (SHINGRIX)    DISCONTINUED: Zoster Vac Recomb Adjuvanted (SHINGRIX) 50 MCG/0.5ML Recon Susp   8. Secondary hypertension           Services suggested: No services needed at this time  Health Care Screening recommendations as per orders if indicated.  Referrals offered: PT/OT/Nutrition counseling/Behavioral Health/Smoking cessation as per orders if indicated.    Discussion today about general wellness and lifestyle habits:    · Prevent falls and reduce trip hazards; Cautioned about securing or removing rugs.  · Have a working fire alarm and carbon monoxide detector;   · Engage in regular physical activity and social activities       Follow-up: Return if symptoms worsen or fail to improve.

## 2020-12-09 NOTE — ASSESSMENT & PLAN NOTE
Chronic problem.  Well-controlled on Lipitor 20 mg, tolerating well.  Last lipid panel WNL from 2019.  He denies CP, dyspnea, dizziness, HA, peripheral edema.

## 2020-12-09 NOTE — ASSESSMENT & PLAN NOTE
New problem.  BP today is 158/66.  Patient denies previous issues.  He denies CP, dizziness, dyspnea, HA, peripheral edema.

## 2020-12-09 NOTE — NON-PROVIDER
Chief Complaint   Patient presents with   • Medicare Annual Wellness         HPI:  Ravindra is a 70 y.o. here for Medicare Annual Wellness Visit    ***    Patient Active Problem List    Diagnosis Date Noted   • Elevated hemoglobin A1c 11/13/2019   • Seasonal allergic rhinitis 11/13/2019   • BPH associated with nocturia 10/24/2019   • Sacral back pain 10/24/2019   • Hyperlipidemia 10/24/2019   • HTN (hypertension) 10/24/2019   • Vitamin D deficiency 10/24/2019       Current Outpatient Medications   Medication Sig Dispense Refill   • atorvastatin (LIPITOR) 20 MG Tab TAKE 1 TABLET BY MOUTH EVERY DAY 30 Tab 1   • ergocalciferol (DRISDOL) 15111 UNIT capsule TAKE 1 CAPSULE BY MOUTH EVERY 7 DAYS 12 Cap 0   • tamsulosin (FLOMAX) 0.4 MG capsule Take 0.4 mg by mouth ONE-HALF HOUR AFTER BREAKFAST.     • cefUROXime (CEFTIN) 250 MG Tab Take 250 mg by mouth 2 times a day.     • predniSONE (DELTASONE) 20 MG Tab Take 3 tabs at once PO daily x 5 days, then take 2 tabs at once daily x 3 days, then take 1 tab PO daily x 2 days (Patient not taking: Reported on 11/13/2019) 23 Tab 0     No current facility-administered medications for this visit.         {MEDICATION ADHERENCE:20401}  Current supplements as per medication list.     Allergies: Patient has no known allergies.    Current social contact/activities: *** ***    Is patient current with immunizations? {YES/NO DUE FOR IZ:20402}    He  reports that he has quit smoking. He started smoking about 50 years ago. He has never used smokeless tobacco. He reports current alcohol use. He reports that he does not use drugs.  Counseling given: Not Answered  Comment: 0.5 x 5 yrs, stopped at age 26        DPA/Advanced directive: {MA ADVANCED DIRECTIVE:70616}    ROS:    Gait: Uses {DEVICE:95948} ***  Ostomy: {YES / NO:42958} ***  Other tubes: {YES / NO:23912} ***  Amputations: {YES / NO:23912} ***  Chronic oxygen use {YES / NO:68018} ***  Last eye exam *** ***  Wears hearing aids: {YES / NO:03690}  ***  : {Urinary leakage?:79646}  ***    Screening:    ***    Depression Screening    Little interest or pleasure in doing things?  0 - not at all  Feeling down, depressed, or hopeless? 0 - not at all  Patient Health Questionnaire Score: 0    If depressive symptoms identified deferred to follow up visit unless specifically addressed in assessment and plan.    Interpretation of PHQ-9 Total Score   Score Severity   1-4 No Depression   5-9 Mild Depression   10-14 Moderate Depression   15-19 Moderately Severe Depression   20-27 Severe Depression    Screening for Cognitive Impairment    Three Minute Recall (river, kayden, finger)  2/3    Zay clock face with all 12 numbers and set the hands to show 10 past 11.  Yes    If cognitive concerns identified, deferred for follow up unless specifically addressed in assessment and plan.    Fall Risk Assessment    Has the patient had two or more falls in the last year or any fall with injury in the last year?  Yes  If fall risk identified, deferred for follow up unless specifically addressed in assessment and plan.    Safety Assessment    Throw rugs on floor.  Yes  Handrails on all stairs.  No  Good lighting in all hallways.  Yes  Difficulty hearing.  Yes  Patient counseled about all safety risks that were identified.    Functional Assessment ADLs    Are there any barriers preventing you from cooking for yourself or meeting nutritional needs?  No.    Are there any barriers preventing you from driving safely or obtaining transportation?  No.    Are there any barriers preventing you from using a telephone or calling for help?  No.    Are there any barriers preventing you from shopping?  No.    Are there any barriers preventing you from taking care of your own finances?  No.    Are there any barriers preventing you from managing your medications?  No.    Are there any barriers preventing you from showering, bathing or dressing yourself?  No.    Are you currently engaging in any  "exercise or physical activity?  Yes.     What is your perception of your health?  Good.    Health Maintenance Summary                IMM DTaP/Tdap/Td Vaccine Overdue 3/15/1970     COLONOSCOPY Overdue 3/15/2001     IMM ZOSTER VACCINES Overdue 3/15/2001     IMM PNEUMOCOCCAL VACCINE: 65+ Years Next Due 3/11/2021      Done 3/11/2020 Imm Admin: Pneumococcal Conjugate Vaccine (Prevnar/PCV-13)          Patient Care Team:  BILL Butterfield as PCP - General (Family Medicine)  Yovani Barreto, PT, DPT as Physical Therapy (Physical Therapy)    Social History     Tobacco Use   • Smoking status: Former Smoker     Start date: 1/8/1970   • Smokeless tobacco: Never Used   • Tobacco comment: 0.5 x 5 yrs, stopped at age 26   Substance Use Topics   • Alcohol use: Yes     Frequency: Monthly or less   • Drug use: Never     Family History   Problem Relation Age of Onset   • Cancer Mother 40        br ca   • Heart Disease Mother    • Cancer Father    • Cancer Brother 61        brain ca     He  has a past medical history of BPH associated with nocturia.   Past Surgical History:   Procedure Laterality Date   • APPENDECTOMY             Exam:     /66   Pulse 63   Temp 36.9 °C (98.4 °F)   Ht 1.778 m (5' 10\")   Wt 96.2 kg (212 lb)   SpO2 98%  Body mass index is 30.42 kg/m².    Hearing {GOOD/FAIR/POOR/EXCELLENT:34347}.    Dentition {DENTITION:79662}  Alert, oriented in no acute distress.  Eye contact is good, speech goal directed, affect calm  ***    Assessment and Plan. The following treatment and monitoring plan is recommended:  ***  No diagnosis found.      Services suggested: { AWV COORDINATION OF SERVICES:20405}  Health Care Screening recommendations as per orders if indicated.  Referrals offered: PT/OT/Nutrition counseling/Behavioral Health/Smoking cessation as per orders if indicated.    Discussion today about general wellness and lifestyle habits:    · Prevent falls and reduce trip hazards; Cautioned about securing or " removing rugs.  · Have a working fire alarm and carbon monoxide detector;   · Engage in regular physical activity and social activities       Follow-up: No follow-ups on file.

## 2020-12-14 ENCOUNTER — HOSPITAL ENCOUNTER (OUTPATIENT)
Dept: LAB | Facility: MEDICAL CENTER | Age: 70
End: 2020-12-14
Attending: NURSE PRACTITIONER
Payer: MEDICARE

## 2020-12-14 DIAGNOSIS — R73.09 ELEVATED HEMOGLOBIN A1C: ICD-10-CM

## 2020-12-14 DIAGNOSIS — Z12.5 SCREENING FOR PROSTATE CANCER: ICD-10-CM

## 2020-12-14 DIAGNOSIS — E78.5 HYPERLIPIDEMIA, UNSPECIFIED HYPERLIPIDEMIA TYPE: ICD-10-CM

## 2020-12-14 LAB
ALBUMIN SERPL BCP-MCNC: 4.1 G/DL (ref 3.2–4.9)
ALBUMIN/GLOB SERPL: 1.6 G/DL
ALP SERPL-CCNC: 110 U/L (ref 30–99)
ALT SERPL-CCNC: 24 U/L (ref 2–50)
ANION GAP SERPL CALC-SCNC: 7 MMOL/L (ref 7–16)
AST SERPL-CCNC: 20 U/L (ref 12–45)
BILIRUB SERPL-MCNC: 0.7 MG/DL (ref 0.1–1.5)
BUN SERPL-MCNC: 21 MG/DL (ref 8–22)
CALCIUM SERPL-MCNC: 9.5 MG/DL (ref 8.5–10.5)
CHLORIDE SERPL-SCNC: 104 MMOL/L (ref 96–112)
CHOLEST SERPL-MCNC: 151 MG/DL (ref 100–199)
CO2 SERPL-SCNC: 29 MMOL/L (ref 20–33)
CREAT SERPL-MCNC: 0.99 MG/DL (ref 0.5–1.4)
EST. AVERAGE GLUCOSE BLD GHB EST-MCNC: 117 MG/DL
FASTING STATUS PATIENT QL REPORTED: NORMAL
GLOBULIN SER CALC-MCNC: 2.5 G/DL (ref 1.9–3.5)
GLUCOSE SERPL-MCNC: 94 MG/DL (ref 65–99)
HBA1C MFR BLD: 5.7 % (ref 0–5.6)
HDLC SERPL-MCNC: 33 MG/DL
LDLC SERPL CALC-MCNC: 87 MG/DL
POTASSIUM SERPL-SCNC: 4.2 MMOL/L (ref 3.6–5.5)
PROT SERPL-MCNC: 6.6 G/DL (ref 6–8.2)
PSA SERPL-MCNC: 2.66 NG/ML (ref 0–4)
SODIUM SERPL-SCNC: 140 MMOL/L (ref 135–145)
TRIGL SERPL-MCNC: 155 MG/DL (ref 0–149)

## 2020-12-14 PROCEDURE — 84153 ASSAY OF PSA TOTAL: CPT | Mod: GA

## 2020-12-14 PROCEDURE — 80053 COMPREHEN METABOLIC PANEL: CPT

## 2020-12-14 PROCEDURE — 83036 HEMOGLOBIN GLYCOSYLATED A1C: CPT | Mod: GA

## 2020-12-14 PROCEDURE — 80061 LIPID PANEL: CPT

## 2020-12-14 PROCEDURE — 36415 COLL VENOUS BLD VENIPUNCTURE: CPT | Mod: GA

## 2020-12-15 RX ORDER — TAMSULOSIN HYDROCHLORIDE 0.4 MG/1
CAPSULE ORAL
Qty: 90 CAP | Refills: 1 | Status: SHIPPED | OUTPATIENT
Start: 2020-12-15 | End: 2021-02-12

## 2020-12-23 ENCOUNTER — HOSPITAL ENCOUNTER (OUTPATIENT)
Dept: CARDIOLOGY | Facility: MEDICAL CENTER | Age: 70
End: 2020-12-23
Attending: NURSE PRACTITIONER
Payer: MEDICARE

## 2020-12-23 DIAGNOSIS — R01.1 CARDIAC MURMUR: ICD-10-CM

## 2020-12-23 LAB
LV EJECT FRACT  99904: 65
LV EJECT FRACT MOD 2C 99903: 65.64
LV EJECT FRACT MOD 4C 99902: 69.24
LV EJECT FRACT MOD BP 99901: 68.66

## 2020-12-23 PROCEDURE — 93306 TTE W/DOPPLER COMPLETE: CPT | Mod: 26 | Performed by: INTERNAL MEDICINE

## 2020-12-23 PROCEDURE — 93306 TTE W/DOPPLER COMPLETE: CPT

## 2020-12-27 DIAGNOSIS — E78.5 HYPERLIPIDEMIA, UNSPECIFIED HYPERLIPIDEMIA TYPE: ICD-10-CM

## 2020-12-29 RX ORDER — ATORVASTATIN CALCIUM 20 MG/1
TABLET, FILM COATED ORAL
Qty: 30 TAB | Refills: 1 | Status: SHIPPED | OUTPATIENT
Start: 2020-12-29 | End: 2021-01-08

## 2021-01-04 ENCOUNTER — HOSPITAL ENCOUNTER (OUTPATIENT)
Dept: LAB | Facility: MEDICAL CENTER | Age: 71
End: 2021-01-04
Attending: NURSE PRACTITIONER
Payer: MEDICARE

## 2021-01-04 DIAGNOSIS — R73.09 ELEVATED HEMOGLOBIN A1C: ICD-10-CM

## 2021-01-04 DIAGNOSIS — E78.5 HYPERLIPIDEMIA, UNSPECIFIED HYPERLIPIDEMIA TYPE: ICD-10-CM

## 2021-01-04 LAB
ALBUMIN SERPL BCP-MCNC: 4.2 G/DL (ref 3.2–4.9)
ALBUMIN/GLOB SERPL: 1.4 G/DL
ALP SERPL-CCNC: 117 U/L (ref 30–99)
ALT SERPL-CCNC: 28 U/L (ref 2–50)
ANION GAP SERPL CALC-SCNC: 11 MMOL/L (ref 7–16)
AST SERPL-CCNC: 26 U/L (ref 12–45)
BILIRUB SERPL-MCNC: 1 MG/DL (ref 0.1–1.5)
BUN SERPL-MCNC: 20 MG/DL (ref 8–22)
CALCIUM SERPL-MCNC: 9.5 MG/DL (ref 8.5–10.5)
CHLORIDE SERPL-SCNC: 104 MMOL/L (ref 96–112)
CHOLEST SERPL-MCNC: 182 MG/DL (ref 100–199)
CO2 SERPL-SCNC: 24 MMOL/L (ref 20–33)
CREAT SERPL-MCNC: 0.95 MG/DL (ref 0.5–1.4)
FASTING STATUS PATIENT QL REPORTED: NORMAL
GLOBULIN SER CALC-MCNC: 2.9 G/DL (ref 1.9–3.5)
GLUCOSE SERPL-MCNC: 103 MG/DL (ref 65–99)
HDLC SERPL-MCNC: 41 MG/DL
LDLC SERPL CALC-MCNC: 103 MG/DL
POTASSIUM SERPL-SCNC: 4.1 MMOL/L (ref 3.6–5.5)
PROT SERPL-MCNC: 7.1 G/DL (ref 6–8.2)
SODIUM SERPL-SCNC: 139 MMOL/L (ref 135–145)
TRIGL SERPL-MCNC: 189 MG/DL (ref 0–149)

## 2021-01-04 PROCEDURE — 80053 COMPREHEN METABOLIC PANEL: CPT

## 2021-01-04 PROCEDURE — 36415 COLL VENOUS BLD VENIPUNCTURE: CPT

## 2021-01-04 PROCEDURE — 80061 LIPID PANEL: CPT

## 2021-01-07 ENCOUNTER — PATIENT MESSAGE (OUTPATIENT)
Dept: MEDICAL GROUP | Facility: PHYSICIAN GROUP | Age: 71
End: 2021-01-07

## 2021-01-07 NOTE — TELEPHONE ENCOUNTER
----- Message from BILL Butterfield sent at 1/5/2021  6:10 PM PST -----  Please let patient know his triglycerides elevated.  Advise low saturated fat and decrease simple carbohydrates diet.  I would like to discuss the details during the appointment.

## 2021-01-08 ENCOUNTER — OFFICE VISIT (OUTPATIENT)
Dept: MEDICAL GROUP | Facility: PHYSICIAN GROUP | Age: 71
End: 2021-01-08
Payer: MEDICARE

## 2021-01-08 VITALS
TEMPERATURE: 98.5 F | WEIGHT: 214.07 LBS | SYSTOLIC BLOOD PRESSURE: 140 MMHG | HEART RATE: 64 BPM | OXYGEN SATURATION: 94 % | RESPIRATION RATE: 18 BRPM | HEIGHT: 68 IN | BODY MASS INDEX: 32.44 KG/M2 | DIASTOLIC BLOOD PRESSURE: 76 MMHG

## 2021-01-08 DIAGNOSIS — I34.0 MITRAL VALVE INSUFFICIENCY, UNSPECIFIED ETIOLOGY: ICD-10-CM

## 2021-01-08 DIAGNOSIS — I15.9 SECONDARY HYPERTENSION: ICD-10-CM

## 2021-01-08 DIAGNOSIS — E78.5 HYPERLIPIDEMIA, UNSPECIFIED HYPERLIPIDEMIA TYPE: ICD-10-CM

## 2021-01-08 PROCEDURE — 99214 OFFICE O/P EST MOD 30 MIN: CPT | Performed by: NURSE PRACTITIONER

## 2021-01-08 RX ORDER — LISINOPRIL 5 MG/1
5 TABLET ORAL DAILY
Qty: 30 TAB | Refills: 2 | Status: SHIPPED | OUTPATIENT
Start: 2021-01-08 | End: 2021-01-19

## 2021-01-08 RX ORDER — ATORVASTATIN CALCIUM 20 MG/1
40 TABLET, FILM COATED ORAL
Qty: 180 TAB | Refills: 3 | Status: SHIPPED | OUTPATIENT
Start: 2021-01-08 | End: 2021-01-28

## 2021-01-08 ASSESSMENT — FIBROSIS 4 INDEX: FIB4 SCORE: 1.7

## 2021-01-08 ASSESSMENT — PATIENT HEALTH QUESTIONNAIRE - PHQ9: CLINICAL INTERPRETATION OF PHQ2 SCORE: 0

## 2021-01-08 NOTE — ASSESSMENT & PLAN NOTE
Results for PRABHA MONTANA (MRN [id number]) as of 1/8/2021 13:02   Ref. Range 1/4/2021 10:51   Cholesterol,Tot Latest Ref Range: 100 - 199 mg/dL 182   Triglycerides Latest Ref Range: 0 - 149 mg/dL 189 (H)   HDL Latest Ref Range: >=40 mg/dL 41   LDL Latest Ref Range: <100 mg/dL 103 (H)   The 10-year ASCVD risk score (Tr HOLGUIN Jr., et al., 2013) is: 23.4%    Values used to calculate the score:      Age: 70 years      Sex: Male      Is Non- : No      Diabetic: No      Tobacco smoker: No      Systolic Blood Pressure: 140 mmHg      Is BP treated: Yes      HDL Cholesterol: 41 mg/dL      Total Cholesterol: 182 mg/dL

## 2021-01-08 NOTE — ASSESSMENT & PLAN NOTE
Chronic problem. BP today 140/76. Previous /66. Pt denies CP, SOB, dizziness, HA, peripheral edema.

## 2021-01-08 NOTE — PROGRESS NOTES
Chief Complaint   Patient presents with   • Hypertension   • Lab Results       HISTORY OF PRESENT ILLNESS: Patient is a 70 y.o. male, established patient who presents today to discuss medical problems as listed below:    Health Maintenance:  COMPLETED    HTN (hypertension)  Chronic problem. BP today 140/76. Previous /66. Pt denies CP, SOB, dizziness, HA, peripheral edema.     Hyperlipidemia  Results for PRABHA MONTANA (MRN [id number]) as of 1/8/2021 13:02   Ref. Range 1/4/2021 10:51   Cholesterol,Tot Latest Ref Range: 100 - 199 mg/dL 182   Triglycerides Latest Ref Range: 0 - 149 mg/dL 189 (H)   HDL Latest Ref Range: >=40 mg/dL 41   LDL Latest Ref Range: <100 mg/dL 103 (H)   The 10-year ASCVD risk score (Tr HOLGUIN JrJulianna, et al., 2013) is: 23.4%    Values used to calculate the score:      Age: 70 years      Sex: Male      Is Non- : No      Diabetic: No      Tobacco smoker: No      Systolic Blood Pressure: 140 mmHg      Is BP treated: Yes      HDL Cholesterol: 41 mg/dL      Total Cholesterol: 182 mg/dL    Mitral valve insufficiency  Reviewed recent echo from December 2020.    CONCLUSIONS  No prior study is available for comparison.   Normal left ventricular systolic function. Left ventricular ejection   fraction is visually estimated to be 65%.  Indeterminate diastolic function.  Normal inferior vena cava size and inspiratory collapse.    Moderate mitral regurgitation and moderate aortic insufficiency.  Patient denies CP, dyspnea, dizziness.      Patient Active Problem List    Diagnosis Date Noted   • Mitral valve insufficiency 01/08/2021   • Elevated BP without diagnosis of hypertension 12/09/2020   • Cardiac murmur 12/09/2020   • Elevated hemoglobin A1c 11/13/2019   • Seasonal allergic rhinitis 11/13/2019   • BPH associated with nocturia 10/24/2019   • Sacral back pain 10/24/2019   • Hyperlipidemia 10/24/2019   • HTN (hypertension) 10/24/2019   • Vitamin D deficiency 10/24/2019         Allergies: Patient has no known allergies.    Current Outpatient Medications   Medication Sig Dispense Refill   • lisinopril (PRINIVIL) 5 MG Tab Take 1 Tab by mouth every day. 30 Tab 2   • atorvastatin (LIPITOR) 20 MG Tab Take 2 Tabs by mouth every bedtime. 180 Tab 3   • tamsulosin (FLOMAX) 0.4 MG capsule TAKE 1 CAPSULE BY MOUTH EVERY DAY 90 Cap 1   • ergocalciferol (DRISDOL) 12176 UNIT capsule TAKE 1 CAPSULE BY MOUTH EVERY 7 DAYS 12 Cap 0     No current facility-administered medications for this visit.        Social History     Tobacco Use   • Smoking status: Former Smoker     Start date: 1/8/1970   • Smokeless tobacco: Never Used   • Tobacco comment: 0.5 x 5 yrs, stopped at age 26   Substance Use Topics   • Alcohol use: Yes     Frequency: Monthly or less   • Drug use: Never     Social History     Social History Narrative   • Not on file       Family History   Problem Relation Age of Onset   • Cancer Mother 40        br ca   • Heart Disease Mother    • Cancer Father    • Cancer Brother 61        brain ca       Allergies, past medical history, past surgical history, family history, social history reviewed and updated.    Review of Systems:     - Constitutional: Negative for fever, chills, unexpected weight change, and fatigue/generalized weakness.     - HEENT: Negative for headaches, vision changes, hearing changes, ear pain, ear discharge, rhinorrhea, sinus congestion, sore throat, and neck pain.      - Respiratory: Negative for cough, sputum production, chest congestion, dyspnea, wheezing, and crackles.      - Cardiovascular: Negative for chest pain, palpitations, orthopnea, and bilateral lower extremity edema.     - Psychiatric/Behavioral: Negative for depression, suicidal/homicidal ideation and memory loss.      All other systems reviewed and are negative    Exam:    /76 (BP Location: Left arm, Patient Position: Sitting, BP Cuff Size: Adult)   Pulse 64   Temp 36.9 °C (98.5 °F) (Temporal)   Resp 18   " Ht 1.727 m (5' 8\") Comment: prev appt  Wt 97.1 kg (214 lb 1.1 oz) Comment: shoe on  SpO2 94%   BMI 32.55 kg/m²  Body mass index is 32.55 kg/m².    Physical Exam:  Constitutional: Well-developed and well-nourished. Not diaphoretic. No distress.   Cardiovascular: Regular rate and rhythm, S1 and S2 without murmur, rubs, or gallops.    Chest: Effort normal. Clear to auscultation throughout. No adventitious sounds.  Neurological: Alert and oriented x 3.   Psychiatric:  Behavior, mood, and affect are appropriate.  MA/nursing note and vitals reviewed.    LABS ECHO: 2020  results reviewed and discussed with the patient, questions answered.    Patient was seen for 25 minutes face to face of which > 50% of appointment time was spent on counseling and coordination of care regarding the above.     Assessment/Plan:  1. Secondary hypertension  Controlled, stable.  Discussed etiology and potential risk factors.  Will initiate lisinopril.  Advised to start BP log.  Will reevaluate in 2 weeks.  - lisinopril (PRINIVIL) 5 MG Tab; Take 1 Tab by mouth every day.  Dispense: 30 Tab; Refill: 2  - Comp Metabolic Panel; Future    2. Hyperlipidemia, unspecified hyperlipidemia type  Uncontrolled, stable.  Will increase Lipitor to 40 mg.  Discussed healthy lifestyle.  We will recheck labs in 3 to 6 months.  - atorvastatin (LIPITOR) 20 MG Tab; Take 2 Tabs by mouth every bedtime.  Dispense: 180 Tab; Refill: 3  - Lipid Profile; Future    3. Mitral valve insufficiency, unspecified etiology  Stable.  Will appreciate cardiology evaluation  - REFERRAL TO CARDIOLOGY       Discussed with patient possible alternative diagnoses, pt is to take all medications as prescribed. If symptoms persist FU w/PCP, if symptoms worsen go to emergency room. If experiencing any side effects from prescribed medications reports to the office immediately or go to emergency room.  Reviewed indication, dosage, usage and potential adverse effects of prescribed " medications. Reviewed risks and benefits of treatment plan. Patient verbalizes understanding of all instruction and verbally agrees to plan.    Return if symptoms worsen or fail to improve.

## 2021-01-08 NOTE — ASSESSMENT & PLAN NOTE
Reviewed recent echo from December 2020.    CONCLUSIONS  No prior study is available for comparison.   Normal left ventricular systolic function. Left ventricular ejection   fraction is visually estimated to be 65%.  Indeterminate diastolic function.  Normal inferior vena cava size and inspiratory collapse.    Moderate mitral regurgitation and moderate aortic insufficiency.  Patient denies CP, dyspnea, dizziness.

## 2021-01-18 ENCOUNTER — TELEPHONE (OUTPATIENT)
Dept: CARDIOLOGY | Facility: MEDICAL CENTER | Age: 71
End: 2021-01-18

## 2021-01-18 ENCOUNTER — TELEPHONE (OUTPATIENT)
Dept: VASCULAR LAB | Facility: MEDICAL CENTER | Age: 71
End: 2021-01-18

## 2021-01-18 NOTE — TELEPHONE ENCOUNTER
Spoke with pts chart (verified wife is on emergency contact list) confirmed this will be pts first time seeing a cardiologist. All recent testing and referring provider notes in pt chart.    Pt is jus to see AK on 1-19-21. Appt date and time confirmed.

## 2021-01-19 ENCOUNTER — OFFICE VISIT (OUTPATIENT)
Dept: CARDIOLOGY | Facility: MEDICAL CENTER | Age: 71
End: 2021-01-19
Payer: MEDICARE

## 2021-01-19 VITALS
RESPIRATION RATE: 12 BRPM | HEIGHT: 68 IN | OXYGEN SATURATION: 97 % | SYSTOLIC BLOOD PRESSURE: 132 MMHG | HEART RATE: 67 BPM | DIASTOLIC BLOOD PRESSURE: 84 MMHG | WEIGHT: 213 LBS | BODY MASS INDEX: 32.28 KG/M2

## 2021-01-19 DIAGNOSIS — I34.0 MITRAL VALVE INSUFFICIENCY, UNSPECIFIED ETIOLOGY: ICD-10-CM

## 2021-01-19 DIAGNOSIS — I15.9 SECONDARY HYPERTENSION: ICD-10-CM

## 2021-01-19 LAB — EKG IMPRESSION: NORMAL

## 2021-01-19 PROCEDURE — 99204 OFFICE O/P NEW MOD 45 MIN: CPT | Performed by: INTERNAL MEDICINE

## 2021-01-19 PROCEDURE — 93000 ELECTROCARDIOGRAM COMPLETE: CPT | Performed by: INTERNAL MEDICINE

## 2021-01-19 RX ORDER — LISINOPRIL 20 MG/1
20 TABLET ORAL DAILY
Qty: 30 TAB | Refills: 11 | Status: SHIPPED | OUTPATIENT
Start: 2021-01-19 | End: 2021-01-28

## 2021-01-19 ASSESSMENT — FIBROSIS 4 INDEX: FIB4 SCORE: 1.7

## 2021-01-19 NOTE — PROGRESS NOTES
Cardiology Initial Consultation Note    Date of note:    1/19/2021    Primary Care Provider: BILL Butterfield  Referring Provider: Laverne Lazar A.P.R*     Patient Name: Ravindra Raines   YOB: 1950  MRN:              [id number]    Chief Complaint: Mitral and aortic regurgitation    Ravindra Raines is a 70 y.o. male  patient presented today regarding abnormal echocardiogram findings.  He was diagnosed with a murmur, subsequently underwent echocardiogram which showed moderate mitral regurgitation, moderate aortic regurgitation.  He feels well denies chest pain, shortness of breath, leg swelling.  Blood pressure has been running high around 1 40-1 50 at home.    ROS    All other systems reviewed and discussed using a comprehensive questionnaire and are negative.     Past medical history, family history, social history, allergies and labs are reviewed and updated as needed as documented below.    Past Medical History:   Diagnosis Date   • BPH associated with nocturia          Past Surgical History:   Procedure Laterality Date   • APPENDECTOMY           Current Outpatient Medications   Medication Sig Dispense Refill   • Multiple Vitamins-Minerals (DAILY MULTI VITAMIN/MINERALS PO) Take  by mouth.     • VITAMIN D PO Take  by mouth.     • lisinopril (PRINIVIL) 20 MG Tab Take 1 Tab by mouth every day. 30 Tab 11   • atorvastatin (LIPITOR) 20 MG Tab Take 2 Tabs by mouth every bedtime. 180 Tab 3   • tamsulosin (FLOMAX) 0.4 MG capsule TAKE 1 CAPSULE BY MOUTH EVERY DAY 90 Cap 1   • ergocalciferol (DRISDOL) 68983 UNIT capsule TAKE 1 CAPSULE BY MOUTH EVERY 7 DAYS 12 Cap 0     No current facility-administered medications for this visit.          No Known Allergies      Family History   Problem Relation Age of Onset   • Cancer Mother 40        br ca   • Heart Disease Mother    • Cancer Father    • Cancer Brother 61        brain ca         Social History     Socioeconomic History   • Marital  "status:      Spouse name: Not on file   • Number of children: Not on file   • Years of education: Not on file   • Highest education level: Not on file   Occupational History   • Occupation: retired   Social Needs   • Financial resource strain: Not on file   • Food insecurity     Worry: Not on file     Inability: Not on file   • Transportation needs     Medical: Not on file     Non-medical: Not on file   Tobacco Use   • Smoking status: Former Smoker     Start date: 1/8/1970   • Smokeless tobacco: Never Used   • Tobacco comment: 0.5 x 5 yrs, stopped at age 26   Substance and Sexual Activity   • Alcohol use: Yes     Frequency: Monthly or less   • Drug use: Never   • Sexual activity: Yes     Partners: Female     Comment:    Lifestyle   • Physical activity     Days per week: Not on file     Minutes per session: Not on file   • Stress: Not on file   Relationships   • Social connections     Talks on phone: Not on file     Gets together: Not on file     Attends Yazidism service: Not on file     Active member of club or organization: Not on file     Attends meetings of clubs or organizations: Not on file     Relationship status: Not on file   • Intimate partner violence     Fear of current or ex partner: Not on file     Emotionally abused: Not on file     Physically abused: Not on file     Forced sexual activity: Not on file   Other Topics Concern   • Not on file   Social History Narrative   • Not on file         Physical Exam:  Ambulatory Vitals  /84 (BP Location: Left arm, Patient Position: Sitting, BP Cuff Size: Adult)   Pulse 67   Resp 12   Ht 1.727 m (5' 8\")   Wt 96.6 kg (213 lb)   SpO2 97%    Oxygen Therapy:  Pulse Oximetry: 97 %  BP Readings from Last 4 Encounters:   01/19/21 132/84   01/08/21 140/76   12/09/20 158/66   11/13/19 130/50       Weight/BMI: Body mass index is 32.39 kg/m².  Wt Readings from Last 4 Encounters:   01/19/21 96.6 kg (213 lb)   01/08/21 97.1 kg (214 lb 1.1 oz)   "   20 96.2 kg (212 lb)   19 97.5 kg (215 lb)       General: Well appearing and in no apparent distress  Head: atrumatic  Eyes: No conjunctival pallor   ENT: normal external appearance of nose and ears  Neck: JVD absent, carotid bruits absent  Lungs: respiratory sounds  normal, additional breath sounds absent  Heart: Regular rhythm,   No palpable thrills on palpation, 2 x 6 systolic murmur apex, 2 x 6 diastolic murmur aortic area, no rubs,   Lower extremity edema absent.   Pedal pulses normal  Abdomen: soft, non tender, non distended.  Extremities/MSK: no clubbing, no cyanosis  Neurological: normal orientation, Gait normal   Psychiatric: Appropriate affect, intact judgement and insight  Skin: Warm extremities        Lab Data Review:  Lab Results   Component Value Date/Time    CHOLSTRLTOT 182 2021 10:51 AM     (H) 2021 10:51 AM    HDL 41 2021 10:51 AM    TRIGLYCERIDE 189 (H) 2021 10:51 AM       Lab Results   Component Value Date/Time    SODIUM 139 2021 10:51 AM    POTASSIUM 4.1 2021 10:51 AM    CHLORIDE 104 2021 10:51 AM    CO2 24 2021 10:51 AM    GLUCOSE 103 (H) 2021 10:51 AM    BUN 20 2021 10:51 AM    CREATININE 0.95 2021 10:51 AM     Lab Results   Component Value Date/Time    ALKPHOSPHAT 117 (H) 2021 10:51 AM    ASTSGOT 26 2021 10:51 AM    ALTSGPT 28 2021 10:51 AM    TBILIRUBIN 1.0 2021 10:51 AM      Lab Results   Component Value Date/Time    WBC 8.9 2019 08:58 AM     EKG today shows sinus bradycardia, probable LVH.    Echocardiogram from 2020 reviewed, personally interpreted  Aortic valve sclerosis with mild to moderate aortic regurgitation.  Posterior mitral valve leaflet prolapse with moderate eccentric regurgitation, not well evaluated.  Normal RV, LV function    Medical Decision Makin-year-old male patient with  1.  Mitral valve prolapse with moderate mitral regurgitation  2.  Aortic  valve sclerosis with moderate aortic regurgitation  3.  Hypertension not well controlled  4.  Hyperlipidemia    His valvular regurgitation appears to be moderate, he is not symptomatic clinically.  However mitral regurgitation is not well evaluated with transthoracic echo.  I will get transesophageal echocardiogram when you see him back in 6 months.  Increase lisinopril to 20 mg daily, advised to get in touch with me in 2 weeks regarding blood pressures.  Continue Lipitor for hyperlipidemia.  Counseled on weight loss, diet.    This note was dictated using Dragon speech recognition software.    Héctor GIL  Interventional cardiologist  [facility], [address]  [address] [address]  Phone: [phone]  Fax: [phone]

## 2021-01-28 ENCOUNTER — TELEMEDICINE (OUTPATIENT)
Dept: MEDICAL GROUP | Facility: PHYSICIAN GROUP | Age: 71
End: 2021-01-28
Payer: MEDICARE

## 2021-01-28 VITALS
TEMPERATURE: 98.7 F | HEART RATE: 71 BPM | OXYGEN SATURATION: 95 % | SYSTOLIC BLOOD PRESSURE: 138 MMHG | HEIGHT: 68 IN | BODY MASS INDEX: 32.4 KG/M2 | WEIGHT: 213.8 LBS | RESPIRATION RATE: 12 BRPM | DIASTOLIC BLOOD PRESSURE: 68 MMHG

## 2021-01-28 DIAGNOSIS — I15.9 SECONDARY HYPERTENSION: ICD-10-CM

## 2021-01-28 DIAGNOSIS — E78.5 HYPERLIPIDEMIA, UNSPECIFIED HYPERLIPIDEMIA TYPE: ICD-10-CM

## 2021-01-28 PROCEDURE — 99214 OFFICE O/P EST MOD 30 MIN: CPT | Mod: 95,CR | Performed by: NURSE PRACTITIONER

## 2021-01-28 RX ORDER — ATORVASTATIN CALCIUM 40 MG/1
40 TABLET, FILM COATED ORAL
Qty: 90 TAB | Refills: 3 | Status: SHIPPED | OUTPATIENT
Start: 2021-01-28 | End: 2021-08-06 | Stop reason: SDUPTHER

## 2021-01-28 RX ORDER — LISINOPRIL 40 MG/1
40 TABLET ORAL DAILY
Qty: 30 TAB | Refills: 1 | Status: SHIPPED | OUTPATIENT
Start: 2021-01-28 | End: 2021-02-12 | Stop reason: SDUPTHER

## 2021-01-28 ASSESSMENT — FIBROSIS 4 INDEX: FIB4 SCORE: 1.7

## 2021-01-28 NOTE — ASSESSMENT & PLAN NOTE
Chronic problem. BP today 138/68. BP log reviewed with average reading 140s-150s/ 70s. Current lisinopril dose 20 mg in am, tolerating well. Denies CP, SOB, dizziness, peripheral edema.

## 2021-01-28 NOTE — PROGRESS NOTES
Telemedicine Visit: Established Patient     This encounter was conducted via zoom.   Verbal consent was obtained. Patient's identity was verified.    Subjective:     Chief Complaint   Patient presents with   • Follow-Up     HTN     Ravindra Raines is a 70 y.o. male presenting for evaluation and management of following problems:    HTN (hypertension)  Chronic problem. BP today 138/68. BP log reviewed with average reading 140s-150s/ 70s. Current lisinopril dose 20 mg in am, tolerating well. Denies CP, SOB, dizziness, peripheral edema.     Hyperlipidemia  Chronic problem. Last visit lipitor increased to 40 mg, tolerating well. Needs refills today. Denies CP, SOB, dizziness, peripheral edema. non smoker.       ROS   Denies any recent fevers or chills. No nausea or vomiting. No chest pains or shortness of breath.     No Known Allergies    Current medicines (including changes today)  Current Outpatient Medications   Medication Sig Dispense Refill   • lisinopril (PRINIVIL) 40 MG tablet Take 1 Tab by mouth every day. 30 Tab 1   • atorvastatin (LIPITOR) 40 MG Tab Take 1 Tab by mouth every bedtime. 90 Tab 3   • Multiple Vitamins-Minerals (DAILY MULTI VITAMIN/MINERALS PO) Take  by mouth.     • VITAMIN D PO Take  by mouth.     • tamsulosin (FLOMAX) 0.4 MG capsule TAKE 1 CAPSULE BY MOUTH EVERY DAY 90 Cap 1     No current facility-administered medications for this visit.        Patient Active Problem List    Diagnosis Date Noted   • Mitral valve insufficiency 01/08/2021   • Elevated BP without diagnosis of hypertension 12/09/2020   • Cardiac murmur 12/09/2020   • Elevated hemoglobin A1c 11/13/2019   • Seasonal allergic rhinitis 11/13/2019   • BPH associated with nocturia 10/24/2019   • Sacral back pain 10/24/2019   • Hyperlipidemia 10/24/2019   • HTN (hypertension) 10/24/2019   • Vitamin D deficiency 10/24/2019       Family History   Problem Relation Age of Onset   • Cancer Mother 40        br ca   • Heart Disease Mother    •  "Cancer Father    • Cancer Brother 61        brain ca       He  has a past medical history of BPH associated with nocturia.  He  has a past surgical history that includes appendectomy.       Objective:   Vitals obtained by patient:  /68   Pulse 71   Temp 37.1 °C (98.7 °F) (Temporal)   Resp 12   Ht 1.727 m (5' 8\")   Wt 97 kg (213 lb 12.8 oz)   SpO2 95%   BMI 32.51 kg/m²      Physical Exam:  General: No acute distress. Well nourished.   HEENT: EOM grossly intact, no scleral icterus, no pharyngeal erythema.   Neck:  No JVD noted at 90 degrees, trachea midline  CVS: Pulse as reported by patient, no visible LE edema.  Resp: Unlabored respiratory effort, no cough or audible wheeze  MSK/Ext: No clubbing or cyanosis visible appreciated.  Skin: No rashes in visible areas.  Neurological: AOx3. CN III-XII grossly intact. No focal deficits.     LABS: 1/2021  results reviewed and discussed with the patient, questions answered.    Patient was seen for 25 minutes face to face of which > 50% of appointment time was spent on counseling and coordination of care regarding the above.   Assessment and Plan:   1. Secondary hypertension  Uncontrolled, stable. Discussed potential etiology as well as a healthy diet and exercise. Advise avoid excessive salt and sugary foods. Will increase Lisinopril to 40 mg, will f/u in 2 wks.  - lisinopril (PRINIVIL) 40 MG tablet; Take 1 Tab by mouth every day.  Dispense: 30 Tab; Refill: 1    2. Hyperlipidemia, unspecified hyperlipidemia type  Uncontrolled, stable. Continue current regimen. Last visit increased Lipitor to 40 mg. Will obtain cardiac scoring test. Healthy lifestyle discussed in great detail. Will check labs in 3-6 mos.  - CT-HEART W/O CONT EVAL CALCIUM; Future  - atorvastatin (LIPITOR) 40 MG Tab; Take 1 Tab by mouth every bedtime.  Dispense: 90 Tab; Refill: 3       Follow-up: No follow-ups on file.          "

## 2021-01-28 NOTE — ASSESSMENT & PLAN NOTE
Chronic problem. Last visit lipitor increased to 40 mg, tolerating well. Needs refills today. Denies CP, SOB, dizziness, peripheral edema. non smoker.

## 2021-01-31 ENCOUNTER — OFFICE VISIT (OUTPATIENT)
Dept: URGENT CARE | Facility: CLINIC | Age: 71
End: 2021-01-31
Payer: MEDICARE

## 2021-01-31 VITALS
RESPIRATION RATE: 20 BRPM | SYSTOLIC BLOOD PRESSURE: 142 MMHG | HEIGHT: 68 IN | BODY MASS INDEX: 32.28 KG/M2 | WEIGHT: 213 LBS | DIASTOLIC BLOOD PRESSURE: 76 MMHG | OXYGEN SATURATION: 97 % | HEART RATE: 68 BPM | TEMPERATURE: 98.4 F

## 2021-01-31 DIAGNOSIS — R35.0 URINARY FREQUENCY: ICD-10-CM

## 2021-01-31 DIAGNOSIS — R34 DECREASED URINE VOLUME: ICD-10-CM

## 2021-01-31 LAB
APPEARANCE UR: CLEAR
BILIRUB UR STRIP-MCNC: NORMAL MG/DL
COLOR UR AUTO: YELLOW
GLUCOSE UR STRIP.AUTO-MCNC: NORMAL MG/DL
KETONES UR STRIP.AUTO-MCNC: NORMAL MG/DL
LEUKOCYTE ESTERASE UR QL STRIP.AUTO: NORMAL
NITRITE UR QL STRIP.AUTO: NORMAL
PH UR STRIP.AUTO: 5.5 [PH] (ref 5–8)
PROT UR QL STRIP: NORMAL MG/DL
RBC UR QL AUTO: NORMAL
SP GR UR STRIP.AUTO: 1.02
UROBILINOGEN UR STRIP-MCNC: 0.2 MG/DL

## 2021-01-31 PROCEDURE — 81002 URINALYSIS NONAUTO W/O SCOPE: CPT | Performed by: FAMILY MEDICINE

## 2021-01-31 PROCEDURE — 99214 OFFICE O/P EST MOD 30 MIN: CPT | Performed by: FAMILY MEDICINE

## 2021-01-31 ASSESSMENT — ENCOUNTER SYMPTOMS
VOMITING: 0
NAUSEA: 0
EYE DISCHARGE: 0
WEIGHT LOSS: 0
EYE REDNESS: 0
MYALGIAS: 0

## 2021-01-31 ASSESSMENT — FIBROSIS 4 INDEX: FIB4 SCORE: 1.7

## 2021-01-31 NOTE — PROGRESS NOTES
"Subjective:      Ravindra Raines is a 70 y.o. male who presents with Urinary Frequency (slight burning, had diarrhea x 5 days)            5 days diarrhea is improving with Pepto-Bismol.  Normal urine output.  No blood in stool.  Intermittent mild abdominal cramping.  No recent foreign travel/camping/antibiotic use.  His primary reason for coming to the urgent care today is 3 days of urinary frequency with low volume.  No blood in urine.  Mild burning sensation.  No fever.  No flank pain.  He initially felt like he was unable to urinate but is now slightly improving.  No PMH urinary tract infection.  He does have PMH BPH and is currently taking Flomax.  No other aggravating or alleviating factors.      Review of Systems   Constitutional: Negative for malaise/fatigue and weight loss.   Eyes: Negative for discharge and redness.   Gastrointestinal: Negative for nausea and vomiting.   Musculoskeletal: Negative for joint pain and myalgias.   Skin: Negative for itching and rash.     .  Medications, Allergies, and current problem list reviewed today in Epic       Objective:     /76 (BP Location: Right arm, Patient Position: Sitting, BP Cuff Size: Adult long)   Pulse 68   Temp 36.9 °C (98.4 °F) (Temporal)   Resp 20   Ht 1.727 m (5' 8\")   Wt 96.6 kg (213 lb)   SpO2 97%   BMI 32.39 kg/m²      Physical Exam  Constitutional:       General: He is not in acute distress.     Appearance: He is well-developed.   HENT:      Head: Normocephalic and atraumatic.   Eyes:      Conjunctiva/sclera: Conjunctivae normal.   Genitourinary:     Comments: No suprapubic tenderness or fullness.  No CVA tenderness.  Skin:     General: Skin is warm and dry.      Findings: No rash.   Neurological:      Mental Status: He is alert and oriented to person, place, and time.                 Assessment/Plan:      UA reviewed    1. Urinary frequency    - REFERRAL TO UROLOGY    2. Decreased urine volume    - REFERRAL TO UROLOGY    Differential " diagnosis, natural history, supportive care, and indications for immediate follow-up discussed at length.     Urinary tract infection.  There is no evidence of this on urine dip today.  Recommend continue tamsulosin and follow-up with urology.  If symptoms worsen go to ED.

## 2021-02-12 ENCOUNTER — OFFICE VISIT (OUTPATIENT)
Dept: MEDICAL GROUP | Facility: PHYSICIAN GROUP | Age: 71
End: 2021-02-12
Payer: MEDICARE

## 2021-02-12 VITALS
WEIGHT: 213 LBS | HEART RATE: 69 BPM | SYSTOLIC BLOOD PRESSURE: 125 MMHG | HEIGHT: 68 IN | RESPIRATION RATE: 12 BRPM | BODY MASS INDEX: 32.28 KG/M2 | OXYGEN SATURATION: 96 % | TEMPERATURE: 97.9 F | DIASTOLIC BLOOD PRESSURE: 65 MMHG

## 2021-02-12 DIAGNOSIS — R06.83 SNORING: ICD-10-CM

## 2021-02-12 DIAGNOSIS — N40.1 BPH ASSOCIATED WITH NOCTURIA: ICD-10-CM

## 2021-02-12 DIAGNOSIS — R35.1 BPH ASSOCIATED WITH NOCTURIA: ICD-10-CM

## 2021-02-12 DIAGNOSIS — I15.9 SECONDARY HYPERTENSION: ICD-10-CM

## 2021-02-12 PROCEDURE — 99214 OFFICE O/P EST MOD 30 MIN: CPT | Performed by: NURSE PRACTITIONER

## 2021-02-12 RX ORDER — TAMSULOSIN HYDROCHLORIDE 0.4 MG/1
0.4 CAPSULE ORAL
COMMUNITY
End: 2021-02-12 | Stop reason: SDUPTHER

## 2021-02-12 RX ORDER — TAMSULOSIN HYDROCHLORIDE 0.4 MG/1
0.4 CAPSULE ORAL
Qty: 180 CAPSULE | Refills: 0 | Status: SHIPPED | OUTPATIENT
Start: 2021-02-12 | End: 2021-05-04

## 2021-02-12 RX ORDER — SULFAMETHOXAZOLE AND TRIMETHOPRIM 400; 80 MG/1; MG/1
1 TABLET ORAL 2 TIMES DAILY
COMMUNITY
End: 2021-08-25

## 2021-02-12 RX ORDER — LISINOPRIL 40 MG/1
40 TABLET ORAL DAILY
Qty: 90 TABLET | Refills: 3 | Status: SHIPPED | OUTPATIENT
Start: 2021-02-12 | End: 2021-08-06 | Stop reason: SDUPTHER

## 2021-02-12 ASSESSMENT — FIBROSIS 4 INDEX: FIB4 SCORE: 1.7

## 2021-02-12 NOTE — PROGRESS NOTES
Chief Complaint   Patient presents with   • Hypertension Follow-up     BP log        HISTORY OF PRESENT ILLNESS: Patient is a 70 y.o. male, established patient who presents today to discuss medical problems as listed below:    Health Maintenance:  COMPLETED     Snoring  New problem.  Patient and wife report patient has been snoring for years, he denies daytime fatigue.  He was suspecting nasal polyps, however this was never evaluated.  He denies CP, S OB, dizziness.  He denies history of frequent URIs or sinus pressure.    BPH associated with nocturia  Chronic problem.  Recently diagnosed with prostate inflammation, currently being treated on Bactrim and Flomax dose was increased to 0.4 mg twice daily.  Patient has a pending appointment with urology for follow-up.  Needs a refill on Flomax today.    HTN (hypertension)  Chronic problem.  BP today is 125/65.  BP log reviewed with baseline at SBP 120s and 130s over 60s and 70s.  Patient is currently on lisinopril 40 mg.  He denies CP, S OB, dizziness, HA or peripheral edema.      Patient Active Problem List    Diagnosis Date Noted   • Snoring 02/12/2021   • Mitral valve insufficiency 01/08/2021   • Elevated BP without diagnosis of hypertension 12/09/2020   • Cardiac murmur 12/09/2020   • Elevated hemoglobin A1c 11/13/2019   • Seasonal allergic rhinitis 11/13/2019   • BPH associated with nocturia 10/24/2019   • Sacral back pain 10/24/2019   • Hyperlipidemia 10/24/2019   • HTN (hypertension) 10/24/2019   • Vitamin D deficiency 10/24/2019        Allergies: Patient has no known allergies.    Current Outpatient Medications   Medication Sig Dispense Refill   • sulfamethoxazole-trimethoprim (BACTRIM) 400-80 MG Tab Take 1 tablet by mouth 2 times a day.     • lisinopril (PRINIVIL) 40 MG tablet Take 1 tablet by mouth every day. 90 tablet 3   • tamsulosin (FLOMAX) 0.4 MG capsule Take 1 capsule by mouth 2 (two) times a day. 180 capsule 0   • atorvastatin (LIPITOR) 40 MG Tab Take 1  "Tab by mouth every bedtime. 90 Tab 3   • Multiple Vitamins-Minerals (DAILY MULTI VITAMIN/MINERALS PO) Take  by mouth.     • VITAMIN D PO Take  by mouth.       No current facility-administered medications for this visit.       Social History     Tobacco Use   • Smoking status: Former Smoker     Start date: 1/8/1970   • Smokeless tobacco: Never Used   • Tobacco comment: 0.5 x 5 yrs, stopped at age 26   Substance Use Topics   • Alcohol use: Not Currently   • Drug use: Never     Social History     Social History Narrative   • Not on file       Family History   Problem Relation Age of Onset   • Cancer Mother 40        br ca   • Heart Disease Mother    • Cancer Father    • Cancer Brother 61        brain ca       Allergies, past medical history, past surgical history, family history, social history reviewed and updated.    Review of Systems:     - Constitutional: Negative for fever, chills, unexpected weight change, and fatigue/generalized weakness.     - Respiratory: Negative for cough, sputum production, chest congestion, dyspnea, wheezing, and crackles.      - Cardiovascular: Negative for chest pain, palpitations, orthopnea, and bilateral lower extremity edema.     - Psychiatric/Behavioral: Negative for depression, suicidal/homicidal ideation and memory loss.      All other systems reviewed and are negative    Exam:    /65   Pulse 69   Temp 36.6 °C (97.9 °F) (Temporal)   Resp 12   Ht 1.727 m (5' 8\")   Wt 96.6 kg (213 lb)   SpO2 96%   BMI 32.39 kg/m²  Body mass index is 32.39 kg/m².    Physical Exam:  Constitutional: Well-developed and well-nourished. Not diaphoretic. No distress.   Cardiovascular: Regular rate and rhythm, S1 and S2 without murmur, rubs, or gallops.    Chest: Effort normal. Clear to auscultation throughout. No adventitious sounds.   Neurological: Alert and oriented x 3.   Psychiatric:  Behavior, mood, and affect are appropriate.  MA/nursing note and vitals reviewed.    My total time spent " caring for the patient on the day of the encounter was 25 minutes.   This does not include time spent on separately billable procedures/tests.     Assessment/Plan:  1. Secondary hypertension  Stable on current regimen.  We will continue the lisinopril 40 mg.  90-day refills given.  We will follow-up in 3 months.  - lisinopril (PRINIVIL) 40 MG tablet; Take 1 tablet by mouth every day.  Dispense: 90 tablet; Refill: 3    2. BPH associated with nocturia  Stable on current regimen.  Refills for Flomax twice daily given.  Patient has a follow-up appointment with urology.  - tamsulosin (FLOMAX) 0.4 MG capsule; Take 1 capsule by mouth 2 (two) times a day.  Dispense: 180 capsule; Refill: 0    3. Snoring  Uncontrolled, stable.  Will refer to ENT for evaluation of nasal polyps.  If problems will consist.  Will referral to sleep study to evaluate for sleep apnea.  Patient declines referral today.  We will follow-up next visit.  - REFERRAL TO ENT       Discussed with patient possible alternative diagnoses, pt is to take all medications as prescribed. If symptoms persist FU w/PCP, if symptoms worsen go to emergency room. If experiencing any side effects from prescribed medications reports to the office immediately or go to emergency room.  Reviewed indication, dosage, usage and potential adverse effects of prescribed medications. Reviewed risks and benefits of treatment plan. Patient verbalizes understanding of all instruction and verbally agrees to plan.    No follow-ups on file.

## 2021-02-12 NOTE — ASSESSMENT & PLAN NOTE
Chronic problem.  Recently diagnosed with prostate inflammation, currently being treated on Bactrim and Flomax dose was increased to 0.4 mg twice daily.  Patient has a pending appointment with urology for follow-up.  Needs a refill on Flomax today.

## 2021-02-12 NOTE — ASSESSMENT & PLAN NOTE
Chronic problem.  BP today is 125/65.  BP log reviewed with baseline at SBP 120s and 130s over 60s and 70s.  Patient is currently on lisinopril 40 mg.  He denies CP, S OB, dizziness, HA or peripheral edema.

## 2021-02-12 NOTE — ASSESSMENT & PLAN NOTE
New problem.  Patient and wife report patient has been snoring for years, he denies daytime fatigue.  He was suspecting nasal polyps, however this was never evaluated.  He denies CP, S OB, dizziness.  He denies history of frequent URIs or sinus pressure.

## 2021-03-03 DIAGNOSIS — Z23 NEED FOR VACCINATION: ICD-10-CM

## 2021-05-04 DIAGNOSIS — N40.1 BPH ASSOCIATED WITH NOCTURIA: ICD-10-CM

## 2021-05-04 DIAGNOSIS — R35.1 BPH ASSOCIATED WITH NOCTURIA: ICD-10-CM

## 2021-05-04 RX ORDER — TAMSULOSIN HYDROCHLORIDE 0.4 MG/1
CAPSULE ORAL
Qty: 180 CAPSULE | Refills: 0 | Status: SHIPPED | OUTPATIENT
Start: 2021-05-04 | End: 2021-08-02

## 2021-07-31 DIAGNOSIS — N40.1 BPH ASSOCIATED WITH NOCTURIA: ICD-10-CM

## 2021-07-31 DIAGNOSIS — R35.1 BPH ASSOCIATED WITH NOCTURIA: ICD-10-CM

## 2021-08-03 RX ORDER — TAMSULOSIN HYDROCHLORIDE 0.4 MG/1
CAPSULE ORAL
Qty: 180 CAPSULE | Refills: 0 | Status: ON HOLD | OUTPATIENT
Start: 2021-08-03 | End: 2021-09-09

## 2021-08-06 DIAGNOSIS — E78.5 HYPERLIPIDEMIA, UNSPECIFIED HYPERLIPIDEMIA TYPE: ICD-10-CM

## 2021-08-06 DIAGNOSIS — I15.9 SECONDARY HYPERTENSION: ICD-10-CM

## 2021-08-10 RX ORDER — ATORVASTATIN CALCIUM 40 MG/1
40 TABLET, FILM COATED ORAL
Qty: 90 TABLET | Refills: 3 | Status: SHIPPED | OUTPATIENT
Start: 2021-08-10 | End: 2021-11-19 | Stop reason: SDUPTHER

## 2021-08-10 RX ORDER — LISINOPRIL 40 MG/1
40 TABLET ORAL DAILY
Qty: 90 TABLET | Refills: 3 | Status: SHIPPED | OUTPATIENT
Start: 2021-08-10 | End: 2021-11-19 | Stop reason: SDUPTHER

## 2021-08-25 ENCOUNTER — OFFICE VISIT (OUTPATIENT)
Dept: CARDIOLOGY | Facility: MEDICAL CENTER | Age: 71
End: 2021-08-25
Payer: MEDICARE

## 2021-08-25 VITALS
SYSTOLIC BLOOD PRESSURE: 118 MMHG | DIASTOLIC BLOOD PRESSURE: 70 MMHG | WEIGHT: 206 LBS | OXYGEN SATURATION: 97 % | HEART RATE: 70 BPM | BODY MASS INDEX: 31.22 KG/M2 | RESPIRATION RATE: 14 BRPM | HEIGHT: 68 IN

## 2021-08-25 DIAGNOSIS — I35.1 NONRHEUMATIC AORTIC VALVE INSUFFICIENCY: ICD-10-CM

## 2021-08-25 LAB — EKG IMPRESSION: NORMAL

## 2021-08-25 PROCEDURE — 93000 ELECTROCARDIOGRAM COMPLETE: CPT | Performed by: INTERNAL MEDICINE

## 2021-08-25 PROCEDURE — 99214 OFFICE O/P EST MOD 30 MIN: CPT | Performed by: INTERNAL MEDICINE

## 2021-08-25 RX ORDER — SULFAMETHOXAZOLE AND TRIMETHOPRIM 800; 160 MG/1; MG/1
TABLET ORAL
COMMUNITY
End: 2021-08-25

## 2021-08-25 RX ORDER — ATORVASTATIN CALCIUM 40 MG/1
TABLET, FILM COATED ORAL
COMMUNITY
End: 2021-08-25

## 2021-08-25 RX ORDER — TAMSULOSIN HYDROCHLORIDE 0.4 MG/1
CAPSULE ORAL
COMMUNITY
End: 2021-08-25

## 2021-08-25 ASSESSMENT — FIBROSIS 4 INDEX: FIB4 SCORE: 1.72

## 2021-08-25 NOTE — PROGRESS NOTES
Cardiology Follow-up Consultation Note    Date of note:    8/25/2021    Primary Care Provider: BILL Butterfield    Name:             Ravindra Raines   YOB: 1950  MRN:               [id number]    CC: Follow-up moderate aortic, mitral regurgitation    Patient ID/HPI:   71-year-old male patient here for follow-up.  Denies chest pain, shortness of breath.  No complaints.  No major events since last evaluated by me.      Past Medical History:   Diagnosis Date   • BPH associated with nocturia          Past Surgical History:   Procedure Laterality Date   • APPENDECTOMY           Current Outpatient Medications   Medication Sig Dispense Refill   • atorvastatin (LIPITOR) 40 MG Tab Take 1 tablet by mouth at bedtime. 90 tablet 3   • lisinopril (PRINIVIL) 40 MG tablet Take 1 tablet by mouth every day. 90 tablet 3   • tamsulosin (FLOMAX) 0.4 MG capsule TAKE 1 CAPSULE BY MOUTH TWICE A  capsule 0   • Multiple Vitamins-Minerals (DAILY MULTI VITAMIN/MINERALS PO) Take  by mouth.     • VITAMIN D PO Take  by mouth.       No current facility-administered medications for this visit.         No Known Allergies      Family History   Problem Relation Age of Onset   • Cancer Mother 40        br ca   • Heart Disease Mother    • Cancer Father    • Cancer Brother 61        brain ca         Social History     Socioeconomic History   • Marital status:      Spouse name: Not on file   • Number of children: Not on file   • Years of education: Not on file   • Highest education level: Not on file   Occupational History   • Occupation: retired   Tobacco Use   • Smoking status: Former Smoker     Start date: 1/8/1970   • Smokeless tobacco: Never Used   • Tobacco comment: 0.5 x 5 yrs, stopped at age 26   Vaping Use   • Vaping Use: Never used   Substance and Sexual Activity   • Alcohol use: Not Currently   • Drug use: Never   • Sexual activity: Yes     Partners: Female     Comment:    Other Topics  "Concern   • Not on file   Social History Narrative   • Not on file     Social Determinants of Health     Financial Resource Strain:    • Difficulty of Paying Living Expenses:    Food Insecurity:    • Worried About Running Out of Food in the Last Year:    • Ran Out of Food in the Last Year:    Transportation Needs:    • Lack of Transportation (Medical):    • Lack of Transportation (Non-Medical):    Physical Activity:    • Days of Exercise per Week:    • Minutes of Exercise per Session:    Stress:    • Feeling of Stress :    Social Connections:    • Frequency of Communication with Friends and Family:    • Frequency of Social Gatherings with Friends and Family:    • Attends Denominational Services:    • Active Member of Clubs or Organizations:    • Attends Club or Organization Meetings:    • Marital Status:    Intimate Partner Violence:    • Fear of Current or Ex-Partner:    • Emotionally Abused:    • Physically Abused:    • Sexually Abused:          Physical Exam:  Ambulatory Vitals  /70 (BP Location: Left arm, Patient Position: Sitting, BP Cuff Size: Adult)   Pulse 70   Resp 14   Ht 1.727 m (5' 8\")   Wt 93.4 kg (206 lb)   SpO2 97%    Oxygen Therapy:  Pulse Oximetry: 97 %  BP Readings from Last 4 Encounters:   08/25/21 118/70   02/12/21 125/65   01/31/21 142/76   01/28/21 138/68       Weight/BMI: Body mass index is 31.32 kg/m².  Wt Readings from Last 4 Encounters:   08/25/21 93.4 kg (206 lb)   02/12/21 96.6 kg (213 lb)   01/31/21 96.6 kg (213 lb)   01/28/21 97 kg (213 lb 12.8 oz)       General: Well appearing and in no apparent distress  Head: atrumatic  Eyes: No conjunctival pallor   ENT: normal external appearance of nose and ears  Neck: JVD absent, carotid bruits absent  Lungs: respiratory sounds  normal, additional breath sounds absent  Heart: Regular rhythm,   No palpable thrills on palpation, 3 x 6 diastolic murmur apex, aortic area no rubs,   Lower extremity edema absent.   Pedal pulses normal  Abdomen: " soft, non tender, non distended.  Extremities/MSK: no clubbing, no cyanosis  Neurological: normal orientation, Gait normal   Psychiatric: Appropriate affect, intact judgement and insight  Skin: Warm extremities        Lab Data Review:  Lab Results   Component Value Date/Time    CHOLSTRLTOT 182 01/04/2021 10:51 AM     (H) 01/04/2021 10:51 AM    HDL 41 01/04/2021 10:51 AM    TRIGLYCERIDE 189 (H) 01/04/2021 10:51 AM       Lab Results   Component Value Date/Time    SODIUM 139 01/04/2021 10:51 AM    POTASSIUM 4.1 01/04/2021 10:51 AM    CHLORIDE 104 01/04/2021 10:51 AM    CO2 24 01/04/2021 10:51 AM    GLUCOSE 103 (H) 01/04/2021 10:51 AM    BUN 20 01/04/2021 10:51 AM    CREATININE 0.95 01/04/2021 10:51 AM     Lab Results   Component Value Date/Time    ALKPHOSPHAT 117 (H) 01/04/2021 10:51 AM    ASTSGOT 26 01/04/2021 10:51 AM    ALTSGPT 28 01/04/2021 10:51 AM    TBILIRUBIN 1.0 01/04/2021 10:51 AM      Lab Results   Component Value Date/Time    WBC 8.9 11/06/2019 08:58 AM   Primary care physician notes reviewed from 2/12/2021    Echocardiogram 12/23/2020 shows moderate aortic, mitral regurgitation    Impression and Plan:  71-year-old male patient with moderate aortic regurgitation, mitral regurgitation, hypertension.  He is a symptomatic but his aortic regurgitation murmur is more prominent compared to prior . we will set up transesophageal echocardiogram to better evaluate his valvular disease.  Heart rate is slightly irregular on examination today EKG showed sinus rhythm with interpolated PVCs    Return in about 6 months (around 2/25/2022).      Héctor GIL  Interventional cardiologist  [facility], [address]  [address] [place] [address]  Phone: [phone]  Fax: [phone]

## 2021-08-27 ENCOUNTER — TELEPHONE (OUTPATIENT)
Dept: CARDIOLOGY | Facility: MEDICAL CENTER | Age: 71
End: 2021-08-27

## 2021-08-30 NOTE — TELEPHONE ENCOUNTER
Per patients request, patient is scheduled on 9-9-21 for a RACQUEL w/anesthesia with Dr. Flores. No meds to stop and patient to check in at 9:00 for an 11:00 procedure. H&P was done on 8-25-21 by Dr. Donohue. Pre admit to call patient. Patient didn't want to wait for the next available with Dr. Donohue and was ok with another Dr. Doing procedure.

## 2021-09-02 ENCOUNTER — PRE-ADMISSION TESTING (OUTPATIENT)
Dept: ADMISSIONS | Facility: MEDICAL CENTER | Age: 71
End: 2021-09-02
Attending: INTERNAL MEDICINE
Payer: MEDICARE

## 2021-09-02 DIAGNOSIS — Z01.812 PRE-OPERATIVE LABORATORY EXAMINATION: ICD-10-CM

## 2021-09-02 LAB
SARS-COV-2 RNA RESP QL NAA+PROBE: NOTDETECTED
SPECIMEN SOURCE: NORMAL

## 2021-09-02 PROCEDURE — U0005 INFEC AGEN DETEC AMPLI PROBE: HCPCS

## 2021-09-02 PROCEDURE — C9803 HOPD COVID-19 SPEC COLLECT: HCPCS

## 2021-09-02 PROCEDURE — U0003 INFECTIOUS AGENT DETECTION BY NUCLEIC ACID (DNA OR RNA); SEVERE ACUTE RESPIRATORY SYNDROME CORONAVIRUS 2 (SARS-COV-2) (CORONAVIRUS DISEASE [COVID-19]), AMPLIFIED PROBE TECHNIQUE, MAKING USE OF HIGH THROUGHPUT TECHNOLOGIES AS DESCRIBED BY CMS-2020-01-R: HCPCS

## 2021-09-02 ASSESSMENT — FIBROSIS 4 INDEX: FIB4 SCORE: 1.72

## 2021-09-09 ENCOUNTER — HOSPITAL ENCOUNTER (OUTPATIENT)
Facility: MEDICAL CENTER | Age: 71
End: 2021-09-09
Attending: INTERNAL MEDICINE | Admitting: INTERNAL MEDICINE
Payer: MEDICARE

## 2021-09-09 ENCOUNTER — APPOINTMENT (OUTPATIENT)
Dept: CARDIOLOGY | Facility: MEDICAL CENTER | Age: 71
End: 2021-09-09
Attending: INTERNAL MEDICINE
Payer: MEDICARE

## 2021-09-09 ENCOUNTER — ANESTHESIA (OUTPATIENT)
Dept: CARDIOLOGY | Facility: MEDICAL CENTER | Age: 71
End: 2021-09-09
Payer: MEDICARE

## 2021-09-09 ENCOUNTER — ANESTHESIA EVENT (OUTPATIENT)
Dept: CARDIOLOGY | Facility: MEDICAL CENTER | Age: 71
End: 2021-09-09
Payer: MEDICARE

## 2021-09-09 VITALS
OXYGEN SATURATION: 93 % | TEMPERATURE: 97.9 F | HEART RATE: 58 BPM | BODY MASS INDEX: 30.74 KG/M2 | HEIGHT: 68 IN | DIASTOLIC BLOOD PRESSURE: 76 MMHG | RESPIRATION RATE: 16 BRPM | WEIGHT: 202.82 LBS | SYSTOLIC BLOOD PRESSURE: 137 MMHG

## 2021-09-09 DIAGNOSIS — I35.1 NONRHEUMATIC AORTIC VALVE INSUFFICIENCY: ICD-10-CM

## 2021-09-09 LAB — LV EJECT FRACT  99904: 60

## 2021-09-09 PROCEDURE — 93312 ECHO TRANSESOPHAGEAL: CPT | Mod: 26 | Performed by: INTERNAL MEDICINE

## 2021-09-09 PROCEDURE — 93325 DOPPLER ECHO COLOR FLOW MAPG: CPT

## 2021-09-09 PROCEDURE — 700101 HCHG RX REV CODE 250: Performed by: ANESTHESIOLOGY

## 2021-09-09 PROCEDURE — 700105 HCHG RX REV CODE 258: Performed by: INTERNAL MEDICINE

## 2021-09-09 PROCEDURE — 700111 HCHG RX REV CODE 636 W/ 250 OVERRIDE (IP): Performed by: ANESTHESIOLOGY

## 2021-09-09 PROCEDURE — 160002 HCHG RECOVERY MINUTES (STAT)

## 2021-09-09 PROCEDURE — 93325 DOPPLER ECHO COLOR FLOW MAPG: CPT | Mod: 26 | Performed by: INTERNAL MEDICINE

## 2021-09-09 PROCEDURE — 93320 DOPPLER ECHO COMPLETE: CPT | Mod: 26 | Performed by: INTERNAL MEDICINE

## 2021-09-09 RX ORDER — SODIUM CHLORIDE, SODIUM LACTATE, POTASSIUM CHLORIDE, CALCIUM CHLORIDE 600; 310; 30; 20 MG/100ML; MG/100ML; MG/100ML; MG/100ML
INJECTION, SOLUTION INTRAVENOUS CONTINUOUS
Status: DISCONTINUED | OUTPATIENT
Start: 2021-09-09 | End: 2021-09-09 | Stop reason: HOSPADM

## 2021-09-09 RX ORDER — LIDOCAINE HYDROCHLORIDE 20 MG/ML
INJECTION, SOLUTION EPIDURAL; INFILTRATION; INTRACAUDAL; PERINEURAL PRN
Status: DISCONTINUED | OUTPATIENT
Start: 2021-09-09 | End: 2021-09-09 | Stop reason: SURG

## 2021-09-09 RX ORDER — TAMSULOSIN HYDROCHLORIDE 0.4 MG/1
0.4 CAPSULE ORAL DAILY
COMMUNITY
End: 2021-11-19 | Stop reason: SDUPTHER

## 2021-09-09 RX ORDER — DIPHENHYDRAMINE HYDROCHLORIDE 50 MG/ML
12.5 INJECTION INTRAMUSCULAR; INTRAVENOUS
Status: DISCONTINUED | OUTPATIENT
Start: 2021-09-09 | End: 2021-09-09 | Stop reason: HOSPADM

## 2021-09-09 RX ORDER — HALOPERIDOL 5 MG/ML
1 INJECTION INTRAMUSCULAR
Status: DISCONTINUED | OUTPATIENT
Start: 2021-09-09 | End: 2021-09-09 | Stop reason: HOSPADM

## 2021-09-09 RX ORDER — ONDANSETRON 2 MG/ML
4 INJECTION INTRAMUSCULAR; INTRAVENOUS
Status: DISCONTINUED | OUTPATIENT
Start: 2021-09-09 | End: 2021-09-09 | Stop reason: HOSPADM

## 2021-09-09 RX ADMIN — PROPOFOL 50 MG: 10 INJECTION, EMULSION INTRAVENOUS at 11:21

## 2021-09-09 RX ADMIN — PROPOFOL 50 MG: 10 INJECTION, EMULSION INTRAVENOUS at 11:07

## 2021-09-09 RX ADMIN — PROPOFOL 50 MG: 10 INJECTION, EMULSION INTRAVENOUS at 11:14

## 2021-09-09 RX ADMIN — SODIUM CHLORIDE, POTASSIUM CHLORIDE, SODIUM LACTATE AND CALCIUM CHLORIDE: 600; 310; 30; 20 INJECTION, SOLUTION INTRAVENOUS at 10:44

## 2021-09-09 RX ADMIN — LIDOCAINE HYDROCHLORIDE 100 MG: 20 INJECTION, SOLUTION EPIDURAL; INFILTRATION; INTRACAUDAL at 10:54

## 2021-09-09 RX ADMIN — PROPOFOL 50 MG: 10 INJECTION, EMULSION INTRAVENOUS at 11:00

## 2021-09-09 RX ADMIN — PROPOFOL 100 MG: 10 INJECTION, EMULSION INTRAVENOUS at 10:54

## 2021-09-09 ASSESSMENT — FIBROSIS 4 INDEX: FIB4 SCORE: 1.72

## 2021-09-09 NOTE — OR NURSING
Received from pacu at 1211 and home at 1228.  Vss. Denies pain.  Ambulated out per her request.  Balance steady.  Verbalized understanding of dc instructions. home

## 2021-09-09 NOTE — ANESTHESIA POSTPROCEDURE EVALUATION
Patient: Ravindra Raines    Procedure Summary     Date: 09/09/21 Room / Location: [facility] - ECHOCARDIOLOGY [facility]    Anesthesia Start: 1044 Anesthesia Stop: 1140    Procedure: EC-RACQUEL W/O CONT Diagnosis:       Nonrheumatic aortic valve insufficiency      Nonrheumatic aortic (valve) insufficiency    Scheduled Providers: Omar Flores M.D.; Issa Garcia M.D. Responsible Provider: Issa Garcia M.D.    Anesthesia Type: general ASA Status: 2          Final Anesthesia Type: general  Last vitals  BP   Blood Pressure : 119/60    Temp   36.2 °C (97.2 °F)    Pulse   (!) 58   Resp   18    SpO2   96 %      Anesthesia Post Evaluation    Patient location during evaluation: PACU  Patient participation: complete - patient participated  Level of consciousness: awake and alert    Airway patency: patent  Anesthetic complications: no  Cardiovascular status: hemodynamically stable  Respiratory status: acceptable  Hydration status: euvolemic    PONV: none          No complications documented.     Nurse Pain Score: 0 (NPRS)

## 2021-09-09 NOTE — PROGRESS NOTES
Med rec complete per pt at bedside  Interviewed pt with family at bedside with permission from pt  Allergies reviewed and updated.

## 2021-09-09 NOTE — ANESTHESIA PREPROCEDURE EVALUATION
Relevant Problems   CARDIAC   (positive) Cardiac murmur   (positive) HTN (hypertension)   (positive) Mitral valve insufficiency       Physical Exam    Airway   Mallampati: III  TM distance: <3 FB  Neck ROM: full       Cardiovascular   Rhythm: regular  Rate: normal     Dental    Pulmonary    Abdominal    Neurological              Anesthesia Plan    ASA 2       Plan - general       Airway plan will be natural airway          Induction: intravenous      Pertinent diagnostic labs and testing reviewed    Informed Consent:    Anesthetic plan and risks discussed with patient.

## 2021-09-09 NOTE — DISCHARGE INSTRUCTIONS
Transesophageal Echocardiogram  Transesophageal echocardiogram (RACQUEL) is a test that uses sound waves to take pictures of your heart. RACQUEL is done by passing a flexible tube down the esophagus. The esophagus is the tube that carries food from the throat to the stomach. The pictures give detailed images of your heart. This can help your doctor see if there are problems with your heart.  What happens before the procedure?  Staying hydrated  Follow instructions from your doctor about hydration, which may include:  · Up to 3 hours before the procedure - you may continue to drink clear liquids, such as:  ? Water.  ? Clear fruit juice.  ? Black coffee.  ? Plain tea.    Eating and drinking  Follow instructions from your doctor about eating and drinking, which may include:  · 8 hours before the procedure - stop eating heavy meals or foods such as meat, fried foods, or fatty foods.  · 6 hours before the procedure - stop eating light meals or foods, such as toast or cereal.  · 6 hours before the procedure - stop drinking milk or drinks that contain milk.  · 3 hours before the procedure - stop drinking clear liquids.  General instructions  · You will need to take out any dentures or retainers.  · Plan to have someone take you home from the hospital or clinic.  · If you will be going home right after the procedure, plan to have someone with you for 24 hours.  · Ask your doctor about:  ? Changing or stopping your normal medicines. This is important if you take diabetes medicines or blood thinners.  ? Taking over-the-counter medicines, vitamins, herbs, and supplements.  ? Taking medicines such as aspirin and ibuprofen. These medicines can thin your blood. Do not take these medicines unless your doctor tells you to take them.  What happens during the procedure?  · To lower your risk of infection, your doctors will wash or clean their hands.  · An IV will be put into one of your veins.  · You will be given a medicine to help you  relax (sedative).  · A medicine may be sprayed or gargled. This numbs the back of your throat.  · Your blood pressure, heart rate, and breathing will be watched.  · You may be asked to lay on your left side.  · A bite block will be placed in your mouth. This keeps you from biting the tube.  · The tip of the RACQUEL probe will be placed into the back of your mouth.  · You will be asked to swallow.  · Your doctor will take pictures of your heart.  · The probe and bite block will be taken out.  The procedure may vary among doctors and hospitals.  What happens after the procedure?    · Your blood pressure, heart rate, breathing rate, and blood oxygen level will be watched until the medicines you were given have worn off.  · When you first wake up, your throat may feel sore and numb. This will get better over time. You will not be allowed to eat or drink until the numbness has gone away.  · Do not drive for 24 hours if you were given a medicine to help you relax.  Summary  · RACQUEL is a test that uses sound waves to take pictures of your heart.  · You will be given a medicine to help you relax.  · Do not drive for 24 hours if you were given a medicine to help you relax.  This information is not intended to replace advice given to you by your health care provider. Make sure you discuss any questions you have with your health care provider.    ACTIVITY: Rest and take it easy for the first 24 hours.  A responsible adult is recommended to remain with you during that time.  It is normal to feel sleepy.  We encourage you to not do anything that requires balance, judgment or coordination.    MILD FLU-LIKE SYMPTOMS ARE NORMAL. YOU MAY EXPERIENCE GENERALIZED MUSCLE ACHES, THROAT IRRITATION, HEADACHE AND/OR SOME NAUSEA.    FOR 24 HOURS DO NOT:  Drive, operate machinery or run household appliances.  Drink beer or alcoholic beverages.   Make important decisions or sign legal documents.      DIET: To avoid nausea, slowly advance diet as  tolerated, avoiding spicy or greasy foods for the first day.  Add more substantial food to your diet according to your physician's instructions.  Babies can be fed formula or breast milk as soon as they are hungry.  INCREASE FLUIDS AND FIBER TO AVOID CONSTIPATION.        FOLLOW-UP APPOINTMENT:  A follow-up appointment should be arranged with your doctor; call to schedule.    You should CALL YOUR PHYSICIAN if you develop:  Fever greater than 101 degrees F.  Pain not relieved by medication, or persistent nausea or vomiting.  Excessive bleeding (blood soaking through dressing) or unexpected drainage from the wound.  Extreme redness or swelling around the incision site, drainage of pus or foul smelling drainage.  Inability to urinate or empty your bladder within 8 hours.  Problems with breathing or chest pain.    You should call 911 if you develop problems with breathing or chest pain.  If you are unable to contact your doctor or surgical center, you should go to the nearest emergency room or urgent care center. [facility] cardiology telephone #: *[phone]**    If any questions arise, call your doctor.  If your doctor is not available, please feel free to call the Surgical Center at [phone]. The Contact Center is open Monday through Friday 7AM to 5PM and may speak to a nurse at [phone], or toll free at [phone].     A registered nurse may call you a few days after your surgery to see how you are doing after your procedure.    MEDICATIONS: Resume taking daily medication.  Take prescribed pain medication with food.  If no medication is prescribed, you may take non-aspirin pain medication if needed.  PAIN MEDICATION CAN BE VERY CONSTIPATING.  Take a stool softener or laxative such as senokot, pericolace, or milk of magnesia if needed.    Prescription given for **none*.  Last pain medication given at **none*.    If your physician has prescribed pain medication that includes Acetaminophen (Tylenol), do not take  additional Acetaminophen (Tylenol) while taking the prescribed medication.    Depression / Suicide Risk    As you are discharged from this [facility] facility, it is important to learn how to keep safe from harming yourself.    Recognize the warning signs:  · Abrupt changes in personality, positive or negative- including increase in energy   · Giving away possessions  · Change in eating patterns- significant weight changes-  positive or negative  · Change in sleeping patterns- unable to sleep or sleeping all the time   · Unwillingness or inability to communicate  · Depression  · Unusual sadness, discouragement and loneliness  · Talk of wanting to die  · Neglect of personal appearance   · Rebelliousness- reckless behavior  · Withdrawal from people/activities they love  · Confusion- inability to concentrate     If you or a loved one observes any of these behaviors or has concerns about self-harm, here's what you can do:  · Talk about it- your feelings and reasons for harming yourself  · Remove any means that you might use to hurt yourself (examples: pills, rope, extension cords, firearm)  · Get professional help from the community (Mental Health, Substance Abuse, psychological counseling)  · Do not be alone:Call your Safe Contact- someone whom you trust who will be there for you.  · Call your local CRISIS HOTLINE 309-1007 or 433-039-4592  · Call your local Children's Mobile Crisis Response Team [place] (190) 703-8656 or www.Yododo  · Call the toll free National Suicide Prevention Hotlines   · National Suicide Prevention Lifeline 155-717-LUAK (4490)  · National Hope Line Network 800-SUICIDE (425-5740)

## 2021-09-09 NOTE — ANESTHESIA TIME REPORT
Anesthesia Start and Stop Event Times     Date Time Event    9/9/2021 1033 Ready for Procedure     1044 Anesthesia Start     1140 Anesthesia Stop        Responsible Staff  09/09/21    Name Role Begin End    Issa Garcia M.D. Anesth 1044 1140        Preop Diagnosis (Free Text):  Pre-op Diagnosis             Preop Diagnosis (Codes):    Post op Diagnosis  Shortness of breath      Premium Reason  Non-Premium    Comments:

## 2021-09-10 ENCOUNTER — PATIENT MESSAGE (OUTPATIENT)
Dept: CARDIOLOGY | Facility: MEDICAL CENTER | Age: 71
End: 2021-09-10

## 2021-10-19 ENCOUNTER — TELEPHONE (OUTPATIENT)
Dept: PHYSICAL THERAPY | Facility: MEDICAL CENTER | Age: 71
End: 2021-10-19

## 2021-10-19 NOTE — OP THERAPY DISCHARGE SUMMARY
Outpatient Physical Therapy  DISCHARGE SUMMARY NOTE      [facility] Outpatient Physical Therapy  [address]  [place] [address]  Phone:  [phone]  Fax:  [phone]    Date of Visit: 10/19/2021    Patient: Ravindra Raines  YOB: 1950  MRN: [id number]     Referring Provider: No referring provider defined for this encounter.   Referring Diagnosis No admission diagnoses are documented for this encounter.         Functional Assessment Used        Your patient is being discharged from Physical Therapy with the following comments:   · Patient cancelled or missed more than 2 scheduled appointments/non-compliant  · Patient has failed to schedule or reschedule follow-up visits    Comments:  Patient was seen for 3 visits for therapy with no indication or dc plans. He was last seen 12/17/21. Per protocol we will close his chart      Limitations Remaining:  Unknown     Recommendations:  Follow up with primary care.    Yovani Barreto, PT, DPT    Date: 10/19/2021

## 2021-11-03 ENCOUNTER — HOSPITAL ENCOUNTER (OUTPATIENT)
Dept: LAB | Facility: MEDICAL CENTER | Age: 71
End: 2021-11-03
Attending: NURSE PRACTITIONER
Payer: MEDICARE

## 2021-11-03 DIAGNOSIS — E78.5 HYPERLIPIDEMIA, UNSPECIFIED HYPERLIPIDEMIA TYPE: ICD-10-CM

## 2021-11-03 DIAGNOSIS — I15.9 SECONDARY HYPERTENSION: ICD-10-CM

## 2021-11-03 LAB
ALBUMIN SERPL BCP-MCNC: 4.4 G/DL (ref 3.2–4.9)
ALBUMIN/GLOB SERPL: 2.2 G/DL
ALP SERPL-CCNC: 109 U/L (ref 30–99)
ALT SERPL-CCNC: 20 U/L (ref 2–50)
ANION GAP SERPL CALC-SCNC: 10 MMOL/L (ref 7–16)
AST SERPL-CCNC: 16 U/L (ref 12–45)
BILIRUB SERPL-MCNC: 0.7 MG/DL (ref 0.1–1.5)
BUN SERPL-MCNC: 23 MG/DL (ref 8–22)
CALCIUM SERPL-MCNC: 9.4 MG/DL (ref 8.5–10.5)
CHLORIDE SERPL-SCNC: 106 MMOL/L (ref 96–112)
CHOLEST SERPL-MCNC: 136 MG/DL (ref 100–199)
CO2 SERPL-SCNC: 25 MMOL/L (ref 20–33)
CREAT SERPL-MCNC: 1.03 MG/DL (ref 0.5–1.4)
FASTING STATUS PATIENT QL REPORTED: NORMAL
GLOBULIN SER CALC-MCNC: 2 G/DL (ref 1.9–3.5)
GLUCOSE SERPL-MCNC: 98 MG/DL (ref 65–99)
HDLC SERPL-MCNC: 38 MG/DL
LDLC SERPL CALC-MCNC: 79 MG/DL
POTASSIUM SERPL-SCNC: 4.3 MMOL/L (ref 3.6–5.5)
PROT SERPL-MCNC: 6.4 G/DL (ref 6–8.2)
SODIUM SERPL-SCNC: 141 MMOL/L (ref 135–145)
TRIGL SERPL-MCNC: 94 MG/DL (ref 0–149)

## 2021-11-03 PROCEDURE — 36415 COLL VENOUS BLD VENIPUNCTURE: CPT

## 2021-11-03 PROCEDURE — 80053 COMPREHEN METABOLIC PANEL: CPT

## 2021-11-03 PROCEDURE — 80061 LIPID PANEL: CPT

## 2021-11-19 ENCOUNTER — OFFICE VISIT (OUTPATIENT)
Dept: MEDICAL GROUP | Facility: PHYSICIAN GROUP | Age: 71
End: 2021-11-19
Payer: MEDICARE

## 2021-11-19 VITALS
DIASTOLIC BLOOD PRESSURE: 62 MMHG | WEIGHT: 208 LBS | TEMPERATURE: 98.2 F | RESPIRATION RATE: 16 BRPM | HEART RATE: 60 BPM | SYSTOLIC BLOOD PRESSURE: 130 MMHG | OXYGEN SATURATION: 98 % | BODY MASS INDEX: 31.52 KG/M2 | HEIGHT: 68 IN

## 2021-11-19 DIAGNOSIS — Z23 NEED FOR VACCINATION: ICD-10-CM

## 2021-11-19 DIAGNOSIS — R35.1 BPH ASSOCIATED WITH NOCTURIA: ICD-10-CM

## 2021-11-19 DIAGNOSIS — I15.9 SECONDARY HYPERTENSION: ICD-10-CM

## 2021-11-19 DIAGNOSIS — L84 CALLUS OF HEEL: ICD-10-CM

## 2021-11-19 DIAGNOSIS — Z12.5 SCREENING FOR PROSTATE CANCER: ICD-10-CM

## 2021-11-19 DIAGNOSIS — E78.5 HYPERLIPIDEMIA, UNSPECIFIED HYPERLIPIDEMIA TYPE: ICD-10-CM

## 2021-11-19 DIAGNOSIS — N40.1 BPH ASSOCIATED WITH NOCTURIA: ICD-10-CM

## 2021-11-19 PROCEDURE — 90662 IIV NO PRSV INCREASED AG IM: CPT | Performed by: NURSE PRACTITIONER

## 2021-11-19 PROCEDURE — 99214 OFFICE O/P EST MOD 30 MIN: CPT | Mod: 25 | Performed by: NURSE PRACTITIONER

## 2021-11-19 PROCEDURE — G0008 ADMIN INFLUENZA VIRUS VAC: HCPCS | Performed by: NURSE PRACTITIONER

## 2021-11-19 RX ORDER — TAMSULOSIN HYDROCHLORIDE 0.4 MG/1
0.4 CAPSULE ORAL DAILY
Qty: 90 CAPSULE | Refills: 3 | Status: SHIPPED | OUTPATIENT
Start: 2021-11-19 | End: 2022-12-06

## 2021-11-19 RX ORDER — AMMONIUM LACTATE 12 G/100G
1 LOTION TOPICAL 3 TIMES DAILY PRN
Qty: 225 G | Refills: 1 | Status: SHIPPED | OUTPATIENT
Start: 2021-11-19 | End: 2022-06-08

## 2021-11-19 RX ORDER — ATORVASTATIN CALCIUM 40 MG/1
40 TABLET, FILM COATED ORAL
Qty: 90 TABLET | Refills: 3 | Status: SHIPPED | OUTPATIENT
Start: 2021-11-19 | End: 2023-01-31

## 2021-11-19 RX ORDER — LISINOPRIL 40 MG/1
40 TABLET ORAL DAILY
Qty: 90 TABLET | Refills: 3 | Status: SHIPPED | OUTPATIENT
Start: 2021-11-19 | End: 2023-01-20

## 2021-11-19 NOTE — ASSESSMENT & PLAN NOTE
Chronic problem.  Followed by urology, on Flomax.  Last PSA on 12/14/2020 at 2.66.  No new symptoms.

## 2021-11-19 NOTE — PROGRESS NOTES
Chief Complaint   Patient presents with   • Annual Exam       HISTORY OF PRESENT ILLNESS: Patient is a 71 y.o. male, established patient who presents today to discuss medical problems as listed below:    Health Maintenance:  COMPLETED     Hyperlipidemia  Results for PRABHA MONTANA (MRN [id number]) as of 11/19/2021 10:10   Ref. Range 11/3/2021 09:02   Cholesterol,Tot Latest Ref Range: 100 - 199 mg/dL 136   Triglycerides Latest Ref Range: 0 - 149 mg/dL 94   HDL Latest Ref Range: >=40 mg/dL 38 (A)   LDL Latest Ref Range: <100 mg/dL 79   On atorvastatin 40 mg, tolerating well.  The 10-year ASCVD risk score (Tr HOLGUIN Jr., et al., 2013) is: 20%    Values used to calculate the score:      Age: 71 years      Sex: Male      Is Non- : No      Diabetic: No      Tobacco smoker: No      Systolic Blood Pressure: 130 mmHg      Is BP treated: Yes      HDL Cholesterol: 38 mg/dL      Total Cholesterol: 136 mg/dL    HTN (hypertension)  Chronic problem.  On lisinopril 40 mg, BP today is 130/62 with a pulse of 60.  Recent CMP from 11/3/2021 is WNL.    BPH associated with nocturia  Chronic problem.  Followed by urology, on Flomax.  Last PSA on 12/14/2020 at 2.66.  No new symptoms.    Callus of heel  New problem.  Dry callus in both heels with cracking, painful at times.  No foot care has been done.      Patient Active Problem List    Diagnosis Date Noted   • Callus of heel 11/19/2021   • Snoring 02/12/2021   • Mitral valve insufficiency 01/08/2021   • Elevated BP without diagnosis of hypertension 12/09/2020   • Cardiac murmur 12/09/2020   • Elevated hemoglobin A1c 11/13/2019   • Seasonal allergic rhinitis 11/13/2019   • BPH associated with nocturia 10/24/2019   • Sacral back pain 10/24/2019   • Hyperlipidemia 10/24/2019   • HTN (hypertension) 10/24/2019   • Vitamin D deficiency 10/24/2019        Allergies: Patient has no known allergies.    Current Outpatient Medications   Medication Sig Dispense Refill   •  "lisinopril (PRINIVIL) 40 MG tablet Take 1 Tablet by mouth every day. 90 Tablet 3   • atorvastatin (LIPITOR) 40 MG Tab Take 1 Tablet by mouth at bedtime. 90 Tablet 3   • tamsulosin (FLOMAX) 0.4 MG capsule Take 1 Capsule by mouth every day. 90 Capsule 3   • Multiple Vitamins-Minerals (DAILY MULTI VITAMIN/MINERALS PO) Take 1 Tablet by mouth every day.     • VITAMIN D PO Take 1 Tablet by mouth every day.       No current facility-administered medications for this visit.       Social History     Tobacco Use   • Smoking status: Former Smoker     Start date: 1/8/1970   • Smokeless tobacco: Never Used   • Tobacco comment: 0.5 x 5 yrs, stopped at age 26   Vaping Use   • Vaping Use: Never used   Substance Use Topics   • Alcohol use: Not Currently   • Drug use: Never     Social History     Social History Narrative   • Not on file       Family History   Problem Relation Age of Onset   • Cancer Mother 40        br ca   • Heart Disease Mother    • Cancer Father    • Cancer Brother 61        brain ca       Allergies, past medical history, past surgical history, family history, social history reviewed and updated.    Review of Systems:     - Constitutional: Negative for fever, chills, unexpected weight change, and fatigue/generalized weakness.     - Respiratory: Negative for cough, sputum production, chest congestion, dyspnea, wheezing, and crackles.      - Cardiovascular: Negative for chest pain, palpitations, orthopnea, and bilateral lower extremity edema.     - Skin: Bilateral foot callus with cracks.   - Psychiatric/Behavioral: Negative for depression, suicidal/homicidal ideation and memory loss.      All other systems reviewed and are negative    Exam:    /62   Pulse 60   Temp 36.8 °C (98.2 °F)   Resp 16   Ht 1.727 m (5' 8\")   Wt 94.3 kg (208 lb)   SpO2 98%   BMI 31.63 kg/m²  Body mass index is 31.63 kg/m².    Physical Exam:  Constitutional: Well-developed and well-nourished. Not diaphoretic. No distress.   Skin: " Dry excess callus on bilateral heels with cracks, no bleeding.    Ears:  External ears unremarkable. Tympanic membranes clear and intact.  Nose: Nares patent. Septum midline. Turbinates without erythema nor edema. No discharge.   Mouth/Throat: Dentition is normal. Tongue normal. Oropharynx is clear and moist. Posterior pharynx without erythema or exudates.  Neck:  No lymphadenopathy--cervical or supraclavicular.  Cardiovascular: Regular rate and rhythm, S1 and S2 without murmur, rubs, or gallops.    Chest: Effort normal. Clear to auscultation throughout. No adventitious sounds. : Negative for dysuria, polyuria, hematuria, pyuria, urinary urgency, and urinary incontinence.  Neurological: Alert and oriented x 3.   Psychiatric:  Behavior, mood, and affect are appropriate.  MA/nursing note and vitals reviewed.    LABS: 11/2021  results reviewed and discussed with the patient, questions answered.    Assessment/Plan:  1. Need for vaccination  - Influenza Vaccine, High Dose (65+ Only)    2. Hyperlipidemia, unspecified hyperlipidemia type  Stable on current regimen.  Continue.  Refills given.  Advised to add fish oil and fiber at least 25 g daily to the regimen.  - atorvastatin (LIPITOR) 40 MG Tab; Take 1 Tablet by mouth at bedtime.  Dispense: 90 Tablet; Refill: 3  - Lipid Profile; Future    3. Secondary hypertension  Stable on current regimen.  Continue.  Refills given  - lisinopril (PRINIVIL) 40 MG tablet; Take 1 Tablet by mouth every day.  Dispense: 90 Tablet; Refill: 3  - Comp Metabolic Panel; Future    4. BPH associated with nocturia  Stable on current regimen.  Continue.  Refills given. following with urology  - tamsulosin (FLOMAX) 0.4 MG capsule; Take 1 Capsule by mouth every day.  Dispense: 90 Capsule; Refill: 3    5. Screening for prostate cancer  - PROSTATE SPECIFIC AG SCREENING; Future    6. Callus of heel  Uncontrolled, stable.  Advised to soak feet in Epson magnesium salts and baking soda, utilize heel filing  and keep well moisturized, wear socks.  We will follow-up next visit as needed.       Discussed with patient possible alternative diagnoses, pt is to take all medications as prescribed. If symptoms persist FU w/PCP, if symptoms worsen go to emergency room. If experiencing any side effects from prescribed medications reports to the office immediately or go to emergency room.  Reviewed indication, dosage, usage and potential adverse effects of prescribed medications. Reviewed risks and benefits of treatment plan. Patient verbalizes understanding of all instruction and verbally agrees to plan.    No follow-ups on file. annual, PRN

## 2021-11-19 NOTE — ASSESSMENT & PLAN NOTE
New problem.  Dry callus in both heels with cracking, painful at times.  No foot care has been done.

## 2021-11-19 NOTE — ASSESSMENT & PLAN NOTE
Results for PRABHA MONTANA (MRN [id number]) as of 11/19/2021 10:10   Ref. Range 11/3/2021 09:02   Cholesterol,Tot Latest Ref Range: 100 - 199 mg/dL 136   Triglycerides Latest Ref Range: 0 - 149 mg/dL 94   HDL Latest Ref Range: >=40 mg/dL 38 (A)   LDL Latest Ref Range: <100 mg/dL 79   On atorvastatin 40 mg, tolerating well.  The 10-year ASCVD risk score (Ionakatherin HOLGUIN Jr., et al., 2013) is: 20%    Values used to calculate the score:      Age: 71 years      Sex: Male      Is Non- : No      Diabetic: No      Tobacco smoker: No      Systolic Blood Pressure: 130 mmHg      Is BP treated: Yes      HDL Cholesterol: 38 mg/dL      Total Cholesterol: 136 mg/dL

## 2021-11-19 NOTE — ASSESSMENT & PLAN NOTE
Chronic problem.  On lisinopril 40 mg, BP today is 130/62 with a pulse of 60.  Recent CMP from 11/3/2021 is WNL.

## 2021-12-07 ENCOUNTER — HOSPITAL ENCOUNTER (OUTPATIENT)
Dept: LAB | Facility: MEDICAL CENTER | Age: 71
End: 2021-12-07
Attending: NURSE PRACTITIONER
Payer: MEDICARE

## 2021-12-07 DIAGNOSIS — Z12.5 SCREENING FOR PROSTATE CANCER: ICD-10-CM

## 2021-12-07 DIAGNOSIS — E78.5 HYPERLIPIDEMIA, UNSPECIFIED HYPERLIPIDEMIA TYPE: ICD-10-CM

## 2021-12-07 DIAGNOSIS — I15.9 SECONDARY HYPERTENSION: ICD-10-CM

## 2021-12-07 LAB
ALBUMIN SERPL BCP-MCNC: 4.2 G/DL (ref 3.2–4.9)
ALBUMIN/GLOB SERPL: 1.6 G/DL
ALP SERPL-CCNC: 120 U/L (ref 30–99)
ALT SERPL-CCNC: 39 U/L (ref 2–50)
ANION GAP SERPL CALC-SCNC: 9 MMOL/L (ref 7–16)
AST SERPL-CCNC: 24 U/L (ref 12–45)
BILIRUB SERPL-MCNC: 0.9 MG/DL (ref 0.1–1.5)
BUN SERPL-MCNC: 24 MG/DL (ref 8–22)
CALCIUM SERPL-MCNC: 9.4 MG/DL (ref 8.4–10.2)
CHLORIDE SERPL-SCNC: 104 MMOL/L (ref 96–112)
CHOLEST SERPL-MCNC: 157 MG/DL (ref 100–199)
CO2 SERPL-SCNC: 27 MMOL/L (ref 20–33)
CREAT SERPL-MCNC: 1.07 MG/DL (ref 0.5–1.4)
FASTING STATUS PATIENT QL REPORTED: NORMAL
GLOBULIN SER CALC-MCNC: 2.6 G/DL (ref 1.9–3.5)
GLUCOSE SERPL-MCNC: 108 MG/DL (ref 65–99)
HDLC SERPL-MCNC: 44 MG/DL
LDLC SERPL CALC-MCNC: 84 MG/DL
POTASSIUM SERPL-SCNC: 4.3 MMOL/L (ref 3.6–5.5)
PROT SERPL-MCNC: 6.8 G/DL (ref 6–8.2)
PSA SERPL-MCNC: 3.08 NG/ML (ref 0–4)
SODIUM SERPL-SCNC: 140 MMOL/L (ref 135–145)
TRIGL SERPL-MCNC: 143 MG/DL (ref 0–149)

## 2021-12-07 PROCEDURE — 80053 COMPREHEN METABOLIC PANEL: CPT

## 2021-12-07 PROCEDURE — 36415 COLL VENOUS BLD VENIPUNCTURE: CPT | Mod: GA

## 2021-12-07 PROCEDURE — 84153 ASSAY OF PSA TOTAL: CPT | Mod: GA

## 2021-12-07 PROCEDURE — 80061 LIPID PANEL: CPT

## 2022-01-22 ENCOUNTER — OFFICE VISIT (OUTPATIENT)
Dept: URGENT CARE | Facility: CLINIC | Age: 72
End: 2022-01-22
Payer: MEDICARE

## 2022-01-22 VITALS
OXYGEN SATURATION: 96 % | HEART RATE: 60 BPM | BODY MASS INDEX: 28.92 KG/M2 | TEMPERATURE: 98.9 F | SYSTOLIC BLOOD PRESSURE: 140 MMHG | HEIGHT: 70 IN | WEIGHT: 202 LBS | RESPIRATION RATE: 14 BRPM | DIASTOLIC BLOOD PRESSURE: 70 MMHG

## 2022-01-22 DIAGNOSIS — H00.011 HORDEOLUM EXTERNUM OF RIGHT UPPER EYELID: ICD-10-CM

## 2022-01-22 PROCEDURE — 99213 OFFICE O/P EST LOW 20 MIN: CPT | Performed by: STUDENT IN AN ORGANIZED HEALTH CARE EDUCATION/TRAINING PROGRAM

## 2022-01-22 NOTE — PROGRESS NOTES
"Subjective     Ravindra Raines is a 71 y.o. male who presents with Eye Problem (Today, RT upper eye lid swollen red, some back of eye pain, history of shingles)            Patient is a very agreeable 71-year-old gentleman that presents to clinic with complaint plaints of right upper eyelid swelling and discomfort.  This is been going on for approximately 1 to 2 days.  Patient presents with his daughter for further evaluation      Review of Systems   Eyes:        Upper eyelid swelling redness   All other systems reviewed and are negative.             Objective     /70   Pulse 60   Temp 37.2 °C (98.9 °F) (Temporal)   Resp 14   Ht 1.778 m (5' 10\")   Wt 91.6 kg (202 lb)   SpO2 96%   BMI 28.98 kg/m²      Physical Exam  Vitals reviewed.   Constitutional:       Appearance: Normal appearance.   Eyes:      Comments: Upper eyelid swelling and redness    No evidence scleral injection visual exam grossly normal pupillary reflex intact   Neurological:      Mental Status: He is alert.                             Assessment & Plan        1. Hordeolum externum of right upper eyelid  Patient likely has early evidence of a hordeolum/stye in right upper eyelid.  Conservative management recommended at this point time warm compresses.  No indication for antimicrobial therapy.    Patient daughter were counseled that should symptoms suddenly worsen or fail to improve to return to clinic or call us immediately.  Patient Dors understanding.                "

## 2022-06-08 ENCOUNTER — OFFICE VISIT (OUTPATIENT)
Dept: MEDICAL GROUP | Facility: PHYSICIAN GROUP | Age: 72
End: 2022-06-08
Payer: MEDICARE

## 2022-06-08 VITALS
TEMPERATURE: 98 F | OXYGEN SATURATION: 98 % | SYSTOLIC BLOOD PRESSURE: 134 MMHG | HEART RATE: 63 BPM | RESPIRATION RATE: 16 BRPM | WEIGHT: 211.8 LBS | HEIGHT: 68 IN | DIASTOLIC BLOOD PRESSURE: 60 MMHG | BODY MASS INDEX: 32.1 KG/M2

## 2022-06-08 DIAGNOSIS — Z12.12 SCREENING FOR COLORECTAL CANCER: ICD-10-CM

## 2022-06-08 DIAGNOSIS — I15.9 SECONDARY HYPERTENSION: ICD-10-CM

## 2022-06-08 DIAGNOSIS — Z12.11 SCREENING FOR COLORECTAL CANCER: ICD-10-CM

## 2022-06-08 PROCEDURE — 99214 OFFICE O/P EST MOD 30 MIN: CPT | Performed by: NURSE PRACTITIONER

## 2022-06-08 RX ORDER — AMLODIPINE BESYLATE 5 MG/1
5 TABLET ORAL DAILY
Qty: 30 TABLET | Refills: 1 | Status: SHIPPED | OUTPATIENT
Start: 2022-06-08 | End: 2022-07-01

## 2022-06-08 ASSESSMENT — PATIENT HEALTH QUESTIONNAIRE - PHQ9: CLINICAL INTERPRETATION OF PHQ2 SCORE: 0

## 2022-06-08 NOTE — PROGRESS NOTES
Chief Complaint   Patient presents with   • Follow-Up     BP        HISTORY OF PRESENT ILLNESS: Patient is a 72 y.o. male, established patient who presents today to discuss medical problems as listed below:    Health Maintenance:  COMPLETED     HTN (hypertension)  Chronic problem.  BP log from home averages from 140s to 170s, 1 reading at 101/57 when patient felt weak and lightheaded.  The day he was playing golf and just had breakfast, no lunch.  Otherwise he has no symptoms.  Denies CP, dyspnea, dizziness or peripheral edema.  Currently on lisinopril 40 mg in the morning.       Patient Active Problem List    Diagnosis Date Noted   • Callus of heel 11/19/2021   • Snoring 02/12/2021   • Mitral valve insufficiency 01/08/2021   • Elevated BP without diagnosis of hypertension 12/09/2020   • Cardiac murmur 12/09/2020   • Elevated hemoglobin A1c 11/13/2019   • Seasonal allergic rhinitis 11/13/2019   • BPH associated with nocturia 10/24/2019   • Sacral back pain 10/24/2019   • Hyperlipidemia 10/24/2019   • HTN (hypertension) 10/24/2019   • Vitamin D deficiency 10/24/2019        Allergies: Patient has no known allergies.    Current Outpatient Medications   Medication Sig Dispense Refill   • amLODIPine (NORVASC) 5 MG Tab Take 1 Tablet by mouth every day. 30 Tablet 1   • lisinopril (PRINIVIL) 40 MG tablet Take 1 Tablet by mouth every day. 90 Tablet 3   • atorvastatin (LIPITOR) 40 MG Tab Take 1 Tablet by mouth at bedtime. 90 Tablet 3   • tamsulosin (FLOMAX) 0.4 MG capsule Take 1 Capsule by mouth every day. 90 Capsule 3   • Multiple Vitamins-Minerals (DAILY MULTI VITAMIN/MINERALS PO) Take 1 Tablet by mouth every day.     • VITAMIN D PO Take 1 Tablet by mouth every day.       No current facility-administered medications for this visit.       Social History     Tobacco Use   • Smoking status: Former Smoker     Start date: 1/8/1970   • Smokeless tobacco: Never Used   • Tobacco comment: 0.5 x 5 yrs, stopped at age 26   Vaping Use  "  • Vaping Use: Never used   Substance Use Topics   • Alcohol use: Not Currently   • Drug use: Never     Social History     Social History Narrative   • Not on file       Family History   Problem Relation Age of Onset   • Cancer Mother 40        br ca   • Heart Disease Mother    • Cancer Father    • Cancer Brother 61        brain ca       Allergies, past medical history, past surgical history, family history, social history reviewed and updated.    Review of Systems:     - Constitutional: Negative for fever, chills, unexpected weight change, and fatigue/generalized weakness.     - Respiratory: Negative for cough, sputum production, chest congestion, dyspnea, wheezing, and crackles.      - Cardiovascular: Negative for chest pain, palpitations, orthopnea, and bilateral lower extremity edema.     - Psychiatric/Behavioral: Negative for depression, suicidal/homicidal ideation and memory loss.      All other systems reviewed and are negative    Exam:    /60   Pulse 63   Temp 36.7 °C (98 °F) (Temporal)   Resp 16   Ht 1.727 m (5' 8\")   Wt 96.1 kg (211 lb 12.8 oz)   SpO2 98%   BMI 32.20 kg/m²  Body mass index is 32.2 kg/m².    Physical Exam:  Constitutional: Well-developed and well-nourished. Not diaphoretic. No distress.   Cardiovascular: Regular rate and rhythm, S1 and S2 without murmur, rubs, or gallops.    Chest: Effort normal. Clear to auscultation throughout. No adventitious sounds.   Neurological: Alert and oriented x 3.   Psychiatric:  Behavior, mood, and affect are appropriate.  MA/nursing note and vitals reviewed.    Assessment/Plan:  1. Secondary hypertension  Uncontrolled, stable.  Continue lisinopril 40 mg in a.m.  We will add amlodipine 5 mg at night.  - Referral to 24-Hour Blood Pressure Monitoring  - amLODIPine (NORVASC) 5 MG Tab; Take 1 Tablet by mouth every day.  Dispense: 30 Tablet; Refill: 1    2. Screening for colorectal cancer  - Referral to GI for Colonoscopy       Discussed with patient " possible alternative diagnoses, pt is to take all medications as prescribed. If symptoms persist FU w/PCP, if symptoms worsen go to emergency room. If experiencing any side effects from prescribed medications reports to the office immediately or go to emergency room.  Reviewed indication, dosage, usage and potential adverse effects of prescribed medications. Reviewed risks and benefits of treatment plan. Patient verbalizes understanding of all instruction and verbally agrees to plan.    No follow-ups on file. 3 wks

## 2022-06-08 NOTE — ASSESSMENT & PLAN NOTE
Chronic problem.  BP log from home averages from 140s to 170s, 1 reading at 101/57 when patient felt weak and lightheaded.  The day he was playing golf and just had breakfast, no lunch.  Otherwise he has no symptoms.  Denies CP, dyspnea, dizziness or peripheral edema.  Currently on lisinopril 40 mg in the morning.

## 2022-06-28 ENCOUNTER — PATIENT MESSAGE (OUTPATIENT)
Dept: CARDIOLOGY | Facility: MEDICAL CENTER | Age: 72
End: 2022-06-28
Payer: MEDICARE

## 2022-06-29 ENCOUNTER — NON-PROVIDER VISIT (OUTPATIENT)
Dept: VASCULAR LAB | Facility: MEDICAL CENTER | Age: 72
End: 2022-06-29
Attending: INTERNAL MEDICINE
Payer: MEDICARE

## 2022-06-29 DIAGNOSIS — I15.9 SECONDARY HYPERTENSION: ICD-10-CM

## 2022-06-29 PROCEDURE — 93788 AMBL BP MNTR W/SW A/R: CPT

## 2022-06-29 PROCEDURE — 93786 AMBL BP MNTR W/SW REC ONLY: CPT

## 2022-06-30 ENCOUNTER — DOCUMENTATION (OUTPATIENT)
Dept: VASCULAR LAB | Facility: MEDICAL CENTER | Age: 72
End: 2022-06-30
Payer: MEDICARE

## 2022-06-30 NOTE — PROGRESS NOTES
Placed ABPM on patient at 3:46pm on 06/29/22. Patient expressed understanding of instructions and stated he would return device around 4pm on 6/30/22.

## 2022-07-01 ENCOUNTER — OFFICE VISIT (OUTPATIENT)
Dept: MEDICAL GROUP | Facility: PHYSICIAN GROUP | Age: 72
End: 2022-07-01
Payer: MEDICARE

## 2022-07-01 VITALS
RESPIRATION RATE: 16 BRPM | HEIGHT: 68 IN | TEMPERATURE: 98.5 F | DIASTOLIC BLOOD PRESSURE: 82 MMHG | BODY MASS INDEX: 32.04 KG/M2 | WEIGHT: 211.4 LBS | OXYGEN SATURATION: 98 % | SYSTOLIC BLOOD PRESSURE: 142 MMHG | HEART RATE: 62 BPM

## 2022-07-01 DIAGNOSIS — R55 SYNCOPE, UNSPECIFIED SYNCOPE TYPE: ICD-10-CM

## 2022-07-01 DIAGNOSIS — R73.01 ELEVATED FASTING BLOOD SUGAR: ICD-10-CM

## 2022-07-01 DIAGNOSIS — I15.9 SECONDARY HYPERTENSION: ICD-10-CM

## 2022-07-01 DIAGNOSIS — E03.8 TSH (THYROID-STIMULATING HORMONE DEFICIENCY): ICD-10-CM

## 2022-07-01 PROCEDURE — 99214 OFFICE O/P EST MOD 30 MIN: CPT | Performed by: NURSE PRACTITIONER

## 2022-07-01 RX ORDER — AMLODIPINE BESYLATE 10 MG/1
10 TABLET ORAL DAILY
Qty: 30 TABLET | Refills: 0 | Status: SHIPPED | OUTPATIENT
Start: 2022-07-01 | End: 2022-07-27

## 2022-07-01 RX ORDER — AMLODIPINE BESYLATE 5 MG/1
5 TABLET ORAL DAILY
Qty: 30 TABLET | Refills: 1 | Status: SHIPPED | OUTPATIENT
Start: 2022-07-01 | End: 2022-07-13

## 2022-07-01 NOTE — ASSESSMENT & PLAN NOTE
New problem.  Syncopal episode in the morning around 8:00 on Sunday, 6/26/2022.  Patient reports getting dizzy does not remember how he ended up on the floor, stayed on the floor for a few minutes until he felt strength to get up.  No headache, vision issues no CP or dyspnea.  No previous episodes.  This was unwitnessed, unsupervised.  He felt nauseous after the episode, no vomiting.  When patient woke up he took Prevagen over-the-counter supplementation for memory and remembers feeling the pill in his throat when he was laying on the floor.  He has no issues swallowing.  Hypertension, his blood pressure is not well controlled he is still running in 140s to 150s.  He stays well-hydrated.  No evidence of pigment on or headaches post event.    Cardiac echo from December 2020 normal:  CONCLUSIONS  No prior study is available for comparison.   Normal left ventricular systolic function. Left ventricular ejection   fraction is visually estimated to be 65%.  Indeterminate diastolic function.  Normal inferior vena cava size and inspiratory collapse.    Patient is scheduled with [facility] in October 20.

## 2022-07-01 NOTE — ASSESSMENT & PLAN NOTE
Chronic problem.  BP log from home consistent with blood pressures in the 140s to 150s over 60s and 70s.  BP today is 142/82 with a pulse of 62.  Had a syncopal episode last week on Sunday in the morning.  Denies CP, dyspnea, dizziness other than a single episode on Sunday when he had dizziness in the morning.  He is on lisinopril 40 mg, last visit started on calcium channel blocker amlodipine 5 mg at night.  Will increase amlodipine to 10 mg.

## 2022-07-02 NOTE — PROGRESS NOTES
Chief Complaint   Patient presents with   • Follow-Up     BP       HISTORY OF PRESENT ILLNESS: Patient is a 72 y.o. male, established patient who presents today to discuss medical problems as listed below:    Health Maintenance:  COMPLETED     Syncopal episodes  New problem.  Syncopal episode in the morning around 8:00 on Sunday, 6/26/2022.  Patient reports getting dizzy does not remember how he ended up on the floor, stayed on the floor for a few minutes until he felt strength to get up.  No headache, vision issues no CP or dyspnea.  No previous episodes.  This was unwitnessed, unsupervised.  He felt nauseous after the episode, no vomiting.  When patient woke up he took Prevagen over-the-counter supplementation for memory and remembers feeling the pill in his throat when he was laying on the floor.  He has no issues swallowing.  Hypertension, his blood pressure is not well controlled he is still running in 140s to 150s.  He stays well-hydrated.  No evidence of pigment on or headaches post event.    Cardiac echo from December 2020 normal:  CONCLUSIONS  No prior study is available for comparison.   Normal left ventricular systolic function. Left ventricular ejection   fraction is visually estimated to be 65%.  Indeterminate diastolic function.  Normal inferior vena cava size and inspiratory collapse.    Patient is scheduled with [facility] cardiology in October 20.    HTN (hypertension)  Chronic problem.  BP log from home consistent with blood pressures in the 140s to 150s over 60s and 70s.  BP today is 142/82 with a pulse of 62.  Had a syncopal episode last week on Sunday in the morning.  Denies CP, dyspnea, dizziness other than a single episode on Sunday when he had dizziness in the morning.  He is on lisinopril 40 mg, last visit started on calcium channel blocker amlodipine 5 mg at night.  Will increase amlodipine to 10 mg.      Patient Active Problem List    Diagnosis Date Noted   • Syncopal episodes 07/01/2022   •  Callus of heel 11/19/2021   • Snoring 02/12/2021   • Mitral valve insufficiency 01/08/2021   • Elevated BP without diagnosis of hypertension 12/09/2020   • Cardiac murmur 12/09/2020   • Elevated hemoglobin A1c 11/13/2019   • Seasonal allergic rhinitis 11/13/2019   • BPH associated with nocturia 10/24/2019   • Sacral back pain 10/24/2019   • Hyperlipidemia 10/24/2019   • HTN (hypertension) 10/24/2019   • Vitamin D deficiency 10/24/2019        Allergies: Patient has no known allergies.    Current Outpatient Medications   Medication Sig Dispense Refill   • amLODIPine (NORVASC) 10 MG Tab Take 1 Tablet by mouth every day. 30 Tablet 0   • lisinopril (PRINIVIL) 40 MG tablet Take 1 Tablet by mouth every day. 90 Tablet 3   • atorvastatin (LIPITOR) 40 MG Tab Take 1 Tablet by mouth at bedtime. 90 Tablet 3   • tamsulosin (FLOMAX) 0.4 MG capsule Take 1 Capsule by mouth every day. 90 Capsule 3   • Multiple Vitamins-Minerals (DAILY MULTI VITAMIN/MINERALS PO) Take 1 Tablet by mouth every day.     • VITAMIN D PO Take 1 Tablet by mouth every day.       No current facility-administered medications for this visit.       Social History     Tobacco Use   • Smoking status: Former Smoker     Start date: 1/8/1970   • Smokeless tobacco: Never Used   • Tobacco comment: 0.5 x 5 yrs, stopped at age 26   Vaping Use   • Vaping Use: Never used   Substance Use Topics   • Alcohol use: Not Currently   • Drug use: Never     Social History     Social History Narrative   • Not on file       Family History   Problem Relation Age of Onset   • Cancer Mother 40        br ca   • Heart Disease Mother    • Cancer Father    • Cancer Brother 61        brain ca       Allergies, past medical history, past surgical history, family history, social history reviewed and updated.    Review of Systems:     - Constitutional: Negative for fever, chills, unexpected weight change, and fatigue/generalized weakness.     - Respiratory: Negative for cough, sputum production,  "chest congestion, dyspnea, wheezing, and crackles.      - Cardiovascular: Negative for chest pain, palpitations, orthopnea, and bilateral lower extremity edema.     - Psychiatric/Behavioral: Negative for depression, suicidal/homicidal ideation and memory loss.      All other systems reviewed and are negative    Exam:    BP (!) 142/82   Pulse 62   Temp 36.9 °C (98.5 °F) (Temporal)   Resp 16   Ht 1.727 m (5' 8\")   Wt 95.9 kg (211 lb 6.4 oz)   SpO2 98%   BMI 32.14 kg/m²  Body mass index is 32.14 kg/m².    Physical Exam:  Constitutional: Well-developed and well-nourished. Not diaphoretic. No distress.   Cardiovascular: Regular rate and rhythm, S1 and S2 without murmur, rubs, or gallops.    Chest: Effort normal. Clear to auscultation throughout. No adventitious sounds.   Neurological: Alert and oriented x 3.   Psychiatric:  Behavior, mood, and affect are appropriate.  MA/nursing note and vitals reviewed.    LABS: 2021  results reviewed and discussed with the patient, questions answered.    Assessment/Plan:  1. Syncope, unspecified syncope type  Vasovagal versus acquired heart disease versus dehydration versus antihypertensives.  Will obtain comprehensive testing as follows.  Not suspecting seizure as it were no evidence of postictal disorientation, bitten tongue etc.  Patient advised to avoid driving while being evaluated.  Patient was driven by wife to the office today.  - US-CAROTID DOPPLER BILAT; Future  - EC-ECHOCARDIOGRAM COMPLETE W/O CONT; Future  - CBC WITHOUT DIFFERENTIAL; Future  - Comp Metabolic Panel; Future  - TSH; Future  - T3 FREE; Future  - HEMOGLOBIN A1C; Future  - FREE THYROXINE; Future  - Lipid Profile; Future   - EKG    2. Secondary hypertension  Uncontrolled, stable.  Will increase amlodipine to 10 mg nightly.  We will follow-up in 2 weeks.  - amLODIPine (NORVASC) 10 MG Tab; Take 1 Tablet by mouth every day.  Dispense: 30 Tablet; Refill: 0  - CBC WITHOUT DIFFERENTIAL; Future  - Comp Metabolic " Panel; Future  - TSH; Future  - Lipid Profile; Future    3. Elevated fasting blood sugar  - TSH; Future  - HEMOGLOBIN A1C; Future    4. TSH (thyroid-stimulating hormone deficiency)  - TSH; Future       Discussed with patient possible alternative diagnoses, pt is to take all medications as prescribed. If symptoms persist FU w/PCP, if symptoms worsen go to emergency room. If experiencing any side effects from prescribed medications reports to the office immediately or go to emergency room.  Reviewed indication, dosage, usage and potential adverse effects of prescribed medications. Reviewed risks and benefits of treatment plan. Patient verbalizes understanding of all instruction and verbally agrees to plan.    No follow-ups on file. 2 wks

## 2022-07-05 ENCOUNTER — HOSPITAL ENCOUNTER (OUTPATIENT)
Dept: RADIOLOGY | Facility: MEDICAL CENTER | Age: 72
End: 2022-07-05
Attending: NURSE PRACTITIONER
Payer: MEDICARE

## 2022-07-05 ENCOUNTER — HOSPITAL ENCOUNTER (OUTPATIENT)
Dept: LAB | Facility: MEDICAL CENTER | Age: 72
End: 2022-07-05
Attending: NURSE PRACTITIONER
Payer: MEDICARE

## 2022-07-05 DIAGNOSIS — R73.01 ELEVATED FASTING BLOOD SUGAR: ICD-10-CM

## 2022-07-05 DIAGNOSIS — E03.8 TSH (THYROID-STIMULATING HORMONE DEFICIENCY): ICD-10-CM

## 2022-07-05 DIAGNOSIS — R55 SYNCOPE, UNSPECIFIED SYNCOPE TYPE: ICD-10-CM

## 2022-07-05 DIAGNOSIS — I15.9 SECONDARY HYPERTENSION: ICD-10-CM

## 2022-07-05 LAB
ALBUMIN SERPL BCP-MCNC: 4.1 G/DL (ref 3.2–4.9)
ALBUMIN/GLOB SERPL: 1.6 G/DL
ALP SERPL-CCNC: 113 U/L (ref 30–99)
ALT SERPL-CCNC: 17 U/L (ref 2–50)
ANION GAP SERPL CALC-SCNC: 10 MMOL/L (ref 7–16)
AST SERPL-CCNC: 15 U/L (ref 12–45)
BILIRUB SERPL-MCNC: 0.7 MG/DL (ref 0.1–1.5)
BUN SERPL-MCNC: 29 MG/DL (ref 8–22)
CALCIUM SERPL-MCNC: 9.4 MG/DL (ref 8.5–10.5)
CHLORIDE SERPL-SCNC: 106 MMOL/L (ref 96–112)
CHOLEST SERPL-MCNC: 135 MG/DL (ref 100–199)
CO2 SERPL-SCNC: 25 MMOL/L (ref 20–33)
CREAT SERPL-MCNC: 1.11 MG/DL (ref 0.5–1.4)
ERYTHROCYTE [DISTWIDTH] IN BLOOD BY AUTOMATED COUNT: 41.2 FL (ref 35.9–50)
EST. AVERAGE GLUCOSE BLD GHB EST-MCNC: 120 MG/DL
FASTING STATUS PATIENT QL REPORTED: NORMAL
GFR SERPLBLD CREATININE-BSD FMLA CKD-EPI: 71 ML/MIN/1.73 M 2
GLOBULIN SER CALC-MCNC: 2.6 G/DL (ref 1.9–3.5)
GLUCOSE SERPL-MCNC: 97 MG/DL (ref 65–99)
HBA1C MFR BLD: 5.8 % (ref 4–5.6)
HCT VFR BLD AUTO: 44.3 % (ref 42–52)
HDLC SERPL-MCNC: 37 MG/DL
HGB BLD-MCNC: 15.2 G/DL (ref 14–18)
LDLC SERPL CALC-MCNC: 78 MG/DL
MCH RBC QN AUTO: 30.6 PG (ref 27–33)
MCHC RBC AUTO-ENTMCNC: 34.3 G/DL (ref 33.7–35.3)
MCV RBC AUTO: 89.1 FL (ref 81.4–97.8)
PLATELET # BLD AUTO: 233 K/UL (ref 164–446)
PMV BLD AUTO: 11 FL (ref 9–12.9)
POTASSIUM SERPL-SCNC: 4.4 MMOL/L (ref 3.6–5.5)
PROT SERPL-MCNC: 6.7 G/DL (ref 6–8.2)
RBC # BLD AUTO: 4.97 M/UL (ref 4.7–6.1)
SODIUM SERPL-SCNC: 141 MMOL/L (ref 135–145)
T3FREE SERPL-MCNC: 3.86 PG/ML (ref 2–4.4)
T4 FREE SERPL-MCNC: 1.13 NG/DL (ref 0.93–1.7)
TRIGL SERPL-MCNC: 100 MG/DL (ref 0–149)
TSH SERPL DL<=0.005 MIU/L-ACNC: 2.1 UIU/ML (ref 0.38–5.33)
WBC # BLD AUTO: 6.9 K/UL (ref 4.8–10.8)

## 2022-07-05 PROCEDURE — 84481 FREE ASSAY (FT-3): CPT

## 2022-07-05 PROCEDURE — 83036 HEMOGLOBIN GLYCOSYLATED A1C: CPT | Mod: GZ

## 2022-07-05 PROCEDURE — 80061 LIPID PANEL: CPT

## 2022-07-05 PROCEDURE — 80053 COMPREHEN METABOLIC PANEL: CPT

## 2022-07-05 PROCEDURE — 84439 ASSAY OF FREE THYROXINE: CPT

## 2022-07-05 PROCEDURE — 93880 EXTRACRANIAL BILAT STUDY: CPT

## 2022-07-05 PROCEDURE — 85027 COMPLETE CBC AUTOMATED: CPT

## 2022-07-05 PROCEDURE — 36415 COLL VENOUS BLD VENIPUNCTURE: CPT

## 2022-07-05 PROCEDURE — 93880 EXTRACRANIAL BILAT STUDY: CPT | Mod: 26 | Performed by: INTERNAL MEDICINE

## 2022-07-05 PROCEDURE — 84443 ASSAY THYROID STIM HORMONE: CPT

## 2022-07-06 ENCOUNTER — HOSPITAL ENCOUNTER (OUTPATIENT)
Dept: CARDIOLOGY | Facility: MEDICAL CENTER | Age: 72
End: 2022-07-06
Attending: NURSE PRACTITIONER
Payer: MEDICARE

## 2022-07-06 DIAGNOSIS — R55 SYNCOPE, UNSPECIFIED SYNCOPE TYPE: ICD-10-CM

## 2022-07-06 LAB
LV EJECT FRACT  99904: 60
LV EJECT FRACT MOD 2C 99903: 64.31
LV EJECT FRACT MOD 4C 99902: 57.92
LV EJECT FRACT MOD BP 99901: 61.61

## 2022-07-06 PROCEDURE — 93306 TTE W/DOPPLER COMPLETE: CPT

## 2022-07-06 PROCEDURE — 93306 TTE W/DOPPLER COMPLETE: CPT | Mod: 26 | Performed by: INTERNAL MEDICINE

## 2022-07-13 ENCOUNTER — OFFICE VISIT (OUTPATIENT)
Dept: MEDICAL GROUP | Facility: PHYSICIAN GROUP | Age: 72
End: 2022-07-13
Payer: MEDICARE

## 2022-07-13 VITALS
HEIGHT: 69 IN | RESPIRATION RATE: 12 BRPM | HEART RATE: 60 BPM | DIASTOLIC BLOOD PRESSURE: 62 MMHG | SYSTOLIC BLOOD PRESSURE: 138 MMHG | BODY MASS INDEX: 31.07 KG/M2 | OXYGEN SATURATION: 97 % | TEMPERATURE: 97.5 F | WEIGHT: 209.8 LBS

## 2022-07-13 DIAGNOSIS — I15.9 SECONDARY HYPERTENSION: ICD-10-CM

## 2022-07-13 DIAGNOSIS — R06.83 SNORING: ICD-10-CM

## 2022-07-13 DIAGNOSIS — Z13.6 SCREENING FOR AAA (ABDOMINAL AORTIC ANEURYSM): ICD-10-CM

## 2022-07-13 DIAGNOSIS — R55 SYNCOPE, UNSPECIFIED SYNCOPE TYPE: ICD-10-CM

## 2022-07-13 PROCEDURE — 99214 OFFICE O/P EST MOD 30 MIN: CPT | Performed by: NURSE PRACTITIONER

## 2022-07-13 ASSESSMENT — FIBROSIS 4 INDEX: FIB4 SCORE: 1.11

## 2022-07-13 NOTE — ASSESSMENT & PLAN NOTE
Chronic problem.  Currently on lisinopril 40 mg and amlodipine 10 mg.  BP log from home in 130s and 120s over 60s.  No side effects.  No CP, dyspnea, dizziness or peripheral edema.

## 2022-07-13 NOTE — ASSESSMENT & PLAN NOTE
Is following up on the syncopal episode.  This was a single episode, echo from 7/6/2022 with EF of 60, and mild mitral regurgitation, no CP or shortness of breath.  Results of the carotid ultrasound is noncontributory.  FINDINGS   Right-   Very mild plaque of the carotid bifurcation. Doppler velocities are normal    throughout the carotid arteries.    Left-   Very mild plaque of the carotid bifurcation. Doppler velocities are normal    throughout the carotid arteries.       The bilateral subclavian and vertebral artery waveforms are antegrade and    normal in character and velocity.     In terms of lab results, thyroid was normal, no anemia, lipid panel within normal limits,

## 2022-07-13 NOTE — PROGRESS NOTES
Chief Complaint   Patient presents with   • Follow-Up     Follow up for BP       HISTORY OF PRESENT ILLNESS: Patient is a 72 y.o. male, established patient who presents today to discuss medical problems as listed below:    Health Maintenance:  COMPLETED     Snoring  Wife reports has been snores as well as experiencing nocturnal dyspnea.  Patient admits to morning fatigue.    Syncopal episodes  Is following up on the syncopal episode.  This was a single episode, echo from 7/6/2022 with EF of 60, and mild mitral regurgitation, no CP or shortness of breath.  Results of the carotid ultrasound is noncontributory.  FINDINGS   Right-   Very mild plaque of the carotid bifurcation. Doppler velocities are normal    throughout the carotid arteries.    Left-   Very mild plaque of the carotid bifurcation. Doppler velocities are normal    throughout the carotid arteries.       The bilateral subclavian and vertebral artery waveforms are antegrade and    normal in character and velocity.     In terms of lab results, thyroid was normal, no anemia, lipid panel within normal limits,     HTN (hypertension)  Chronic problem.  Currently on lisinopril 40 mg and amlodipine 10 mg.  BP log from home in 130s and 120s over 60s.  No side effects.  No CP, dyspnea, dizziness or peripheral edema.      Patient Active Problem List    Diagnosis Date Noted   • Syncopal episodes 07/01/2022   • Callus of heel 11/19/2021   • Snoring 02/12/2021   • Mitral valve insufficiency 01/08/2021   • Elevated BP without diagnosis of hypertension 12/09/2020   • Cardiac murmur 12/09/2020   • Elevated hemoglobin A1c 11/13/2019   • Seasonal allergic rhinitis 11/13/2019   • BPH associated with nocturia 10/24/2019   • Sacral back pain 10/24/2019   • Hyperlipidemia 10/24/2019   • HTN (hypertension) 10/24/2019   • Vitamin D deficiency 10/24/2019        Allergies: Patient has no known allergies.    Current Outpatient Medications   Medication Sig Dispense Refill   •  "amLODIPine (NORVASC) 10 MG Tab Take 1 Tablet by mouth every day. 30 Tablet 0   • lisinopril (PRINIVIL) 40 MG tablet Take 1 Tablet by mouth every day. 90 Tablet 3   • atorvastatin (LIPITOR) 40 MG Tab Take 1 Tablet by mouth at bedtime. 90 Tablet 3   • tamsulosin (FLOMAX) 0.4 MG capsule Take 1 Capsule by mouth every day. 90 Capsule 3   • Multiple Vitamins-Minerals (DAILY MULTI VITAMIN/MINERALS PO) Take 1 Tablet by mouth every day.     • VITAMIN D PO Take 1 Tablet by mouth every day.       No current facility-administered medications for this visit.       Social History     Tobacco Use   • Smoking status: Former Smoker     Start date: 1/8/1970   • Smokeless tobacco: Never Used   • Tobacco comment: 0.5 x 5 yrs, stopped at age 26   Vaping Use   • Vaping Use: Never used   Substance Use Topics   • Alcohol use: Not Currently   • Drug use: Never     Social History     Social History Narrative   • Not on file       Family History   Problem Relation Age of Onset   • Cancer Mother 40        br ca   • Heart Disease Mother    • Cancer Father    • Cancer Brother 61        brain ca       Allergies, past medical history, past surgical history, family history, social history reviewed and updated.    Review of Systems:     - Constitutional: Negative for fever, chills, unexpected weight change, and fatigue/generalized weakness.     - Respiratory: Negative for cough, sputum production, chest congestion, dyspnea, wheezing, and crackles.      - Cardiovascular: Negative for chest pain, palpitations, orthopnea, and bilateral lower extremity edema.     - Psychiatric/Behavioral: Negative for depression, suicidal/homicidal ideation and memory loss.      All other systems reviewed and are negative    Exam:    /62 (BP Location: Right arm, Patient Position: Sitting)   Pulse 60   Temp 36.4 °C (97.5 °F) (Temporal)   Resp 12   Ht 1.74 m (5' 8.5\")   Wt 95.2 kg (209 lb 12.8 oz)   SpO2 97%   BMI 31.44 kg/m²  Body mass index is 31.44 " kg/m².    Physical Exam:  Constitutional: Well-developed and well-nourished. Not diaphoretic. No distress.   Cardiovascular: Regular rate and rhythm, S1 and S2 with mild murmur, rubs, or gallops.    Chest: Effort normal. Clear to auscultation throughout. No adventitious sounds.   Neurological: Alert and oriented x 3.   Psychiatric:  Behavior, mood, and affect are appropriate.  MA/nursing note and vitals reviewed.    Assessment/Plan:  1. Snoring  - Referral to Pulmonary and Sleep Medicine    2. Syncope, unspecified syncope type  Will obtain sleep study. Will refer to cardiology for a check up    3. Secondary hypertension  Stable on current regimen.  Continue.  Refills given     4. Screening for AAA (abdominal aortic aneurysm)  - US-ABDOMINAL AORTA W/O DOPPLER; Future       Discussed with patient possible alternative diagnoses, pt is to take all medications as prescribed. If symptoms persist FU w/PCP, if symptoms worsen go to emergency room. If experiencing any side effects from prescribed medications reports to the office immediately or go to emergency room.  Reviewed indication, dosage, usage and potential adverse effects of prescribed medications. Reviewed risks and benefits of treatment plan. Patient verbalizes understanding of all instruction and verbally agrees to plan.    No follow-ups on file. 3 mos

## 2022-07-13 NOTE — ASSESSMENT & PLAN NOTE
Wife reports has been snores as well as experiencing nocturnal dyspnea.  Patient admits to morning fatigue.

## 2022-07-14 NOTE — PROGRESS NOTES
Ambulatory blood pressure monitor results reviewed  Full report under media tab    Suboptimal data acquisition with only 73% successful readings    Mean of available readings 135/59 which would be consistent with systolic blood pressure modestly above goal per ACC AHA guidelines    Nocturnal dip: Difficult to determine given limited nocturnal readings    Clinical correlation needed.  Will defer further management to PCP unless otherwise requested    Michael Bloch, MD  [facility]    Cc:  PATRICIA Lazar

## 2022-07-24 DIAGNOSIS — I15.9 SECONDARY HYPERTENSION: ICD-10-CM

## 2022-07-27 RX ORDER — AMLODIPINE BESYLATE 10 MG/1
10 TABLET ORAL DAILY
Qty: 30 TABLET | Refills: 0 | Status: SHIPPED | OUTPATIENT
Start: 2022-07-27 | End: 2022-08-25

## 2022-08-22 DIAGNOSIS — I15.9 SECONDARY HYPERTENSION: ICD-10-CM

## 2022-08-25 RX ORDER — AMLODIPINE BESYLATE 10 MG/1
10 TABLET ORAL DAILY
Qty: 30 TABLET | Refills: 0 | Status: SHIPPED | OUTPATIENT
Start: 2022-08-25 | End: 2022-09-21

## 2022-09-18 DIAGNOSIS — I15.9 SECONDARY HYPERTENSION: ICD-10-CM

## 2022-09-21 RX ORDER — AMLODIPINE BESYLATE 10 MG/1
10 TABLET ORAL DAILY
Qty: 90 TABLET | Refills: 0 | Status: SHIPPED | OUTPATIENT
Start: 2022-09-21 | End: 2022-12-30

## 2022-10-20 ENCOUNTER — OFFICE VISIT (OUTPATIENT)
Dept: CARDIOLOGY | Facility: MEDICAL CENTER | Age: 72
End: 2022-10-20
Payer: MEDICARE

## 2022-10-20 VITALS
RESPIRATION RATE: 14 BRPM | BODY MASS INDEX: 32.28 KG/M2 | HEART RATE: 65 BPM | HEIGHT: 68 IN | SYSTOLIC BLOOD PRESSURE: 132 MMHG | DIASTOLIC BLOOD PRESSURE: 60 MMHG | OXYGEN SATURATION: 95 % | WEIGHT: 213 LBS

## 2022-10-20 DIAGNOSIS — I70.0 AORTIC CALCIFICATION (HCC): ICD-10-CM

## 2022-10-20 PROCEDURE — 99214 OFFICE O/P EST MOD 30 MIN: CPT | Performed by: INTERNAL MEDICINE

## 2022-10-20 ASSESSMENT — FIBROSIS 4 INDEX: FIB4 SCORE: 1.11

## 2022-10-20 NOTE — PROGRESS NOTES
"      Cardiology Follow-up Consultation Note    Date of note:    8/25/2021    Primary Care Provider: BILL Butterfield    Name:             Ravindra Raines   YOB: 1950  MRN:               [id number]    CC: Follow-up moderate aortic, mitral regurgitation    Patient ID/HPI:   72-year-old male patient here for follow-up.    He feels well denies chest pain, shortness of breath.  Blood pressure has been under good control.      Past Medical History:   Diagnosis Date    Arrhythmia 09/02/2021    BPH associated with nocturia          Past Surgical History:   Procedure Laterality Date    APPENDECTOMY  1990         Current Outpatient Medications   Medication Sig Dispense Refill    amLODIPine (NORVASC) 10 MG Tab TAKE 1 TABLET BY MOUTH EVERY DAY 90 Tablet 0    lisinopril (PRINIVIL) 40 MG tablet Take 1 Tablet by mouth every day. 90 Tablet 3    atorvastatin (LIPITOR) 40 MG Tab Take 1 Tablet by mouth at bedtime. 90 Tablet 3    tamsulosin (FLOMAX) 0.4 MG capsule Take 1 Capsule by mouth every day. 90 Capsule 3    Multiple Vitamins-Minerals (DAILY MULTI VITAMIN/MINERALS PO) Take 1 Tablet by mouth every day.      VITAMIN D PO Take 1 Tablet by mouth every day.       No current facility-administered medications for this visit.         No Known Allergies      Family History   Problem Relation Age of Onset    Cancer Mother 40        br ca    Heart Disease Mother     Cancer Father     Cancer Brother 61        brain ca       Physical Exam:  Ambulatory Vitals  /60 (BP Location: Left arm, Patient Position: Sitting, BP Cuff Size: Adult)   Pulse 65   Resp 14   Ht 1.727 m (5' 8\")   Wt 96.6 kg (213 lb)   SpO2 95%    Oxygen Therapy:  Pulse Oximetry: 95 %  BP Readings from Last 4 Encounters:   10/20/22 132/60   07/13/22 138/62   07/01/22 (!) 142/82   06/08/22 134/60       Weight/BMI: Body mass index is 32.39 kg/m².  Wt Readings from Last 4 Encounters:   10/20/22 96.6 kg (213 lb)   07/13/22 95.2 kg (209 lb 12.8 " oz)   07/01/22 95.9 kg (211 lb 6.4 oz)   06/08/22 96.1 kg (211 lb 12.8 oz)       General: Well appearing and in no apparent distress  Head: atrumatic  Eyes: No conjunctival pallor   ENT: normal external appearance of nose and ears  Neck: JVD absent, carotid bruits absent  Lungs: respiratory sounds  normal, additional breath sounds absent  Heart: Regular rhythm,   No palpable thrills on palpation, 3 x 6 diastolic murmur apex, aortic area,   Lower extremity edema absent.   Pedal pulses normal        Lab Data Review:  Lab Results   Component Value Date/Time    CHOLSTRLTOT 135 07/05/2022 08:10 AM    LDL 78 07/05/2022 08:10 AM    HDL 37 (A) 07/05/2022 08:10 AM    TRIGLYCERIDE 100 07/05/2022 08:10 AM       Lab Results   Component Value Date/Time    SODIUM 141 07/05/2022 08:10 AM    POTASSIUM 4.4 07/05/2022 08:10 AM    CHLORIDE 106 07/05/2022 08:10 AM    CO2 25 07/05/2022 08:10 AM    GLUCOSE 97 07/05/2022 08:10 AM    BUN 29 (H) 07/05/2022 08:10 AM    CREATININE 1.11 07/05/2022 08:10 AM     Lab Results   Component Value Date/Time    ALKPHOSPHAT 113 (H) 07/05/2022 08:10 AM    ASTSGOT 15 07/05/2022 08:10 AM    ALTSGPT 17 07/05/2022 08:10 AM    TBILIRUBIN 0.7 07/05/2022 08:10 AM      Lab Results   Component Value Date/Time    WBC 6.9 07/05/2022 08:10 AM   Primary care physician notes reviewed from 2/12/2021    Echocardiogram 12/23/2020 shows moderate aortic, mitral regurgitation    Echocardiogram 7/6/2022 reviewed, independently interpreted shows normal LV function, no significant changes moderate mitral, aortic regurgitation.    Impression and Plan:  72-year-old male patient with moderate aortic regurgitation, mild aortic atherosclerosis, calcification mitral regurgitation, hypertension, dyslipidemia.    He is stable from cardiovascular standpoint.  Risk factors well controlled, lipids, blood pressure well controlled.  Continue amlodipine, Lipitor, lisinopril.  Valvular disease is stable on echocardiogram.      Return in  about 1 year (around 10/20/2023).      Héctor GIL  Interventional cardiologist  [facility], [facility]  [address] [address]  Phone: [phone]  Fax: [phone]

## 2022-10-21 ENCOUNTER — APPOINTMENT (OUTPATIENT)
Dept: RADIOLOGY | Facility: MEDICAL CENTER | Age: 72
End: 2022-10-21
Attending: HOSPITALIST
Payer: MEDICARE

## 2022-10-21 ENCOUNTER — APPOINTMENT (OUTPATIENT)
Dept: RADIOLOGY | Facility: MEDICAL CENTER | Age: 72
End: 2022-10-21
Attending: EMERGENCY MEDICINE
Payer: MEDICARE

## 2022-10-21 ENCOUNTER — HOSPITAL ENCOUNTER (OUTPATIENT)
Facility: MEDICAL CENTER | Age: 72
End: 2022-10-22
Attending: EMERGENCY MEDICINE | Admitting: HOSPITALIST
Payer: MEDICARE

## 2022-10-21 DIAGNOSIS — R09.02 HYPOXEMIA: ICD-10-CM

## 2022-10-21 DIAGNOSIS — R41.82 ALTERED MENTAL STATUS, UNSPECIFIED ALTERED MENTAL STATUS TYPE: ICD-10-CM

## 2022-10-21 DIAGNOSIS — J96.01 ACUTE RESPIRATORY FAILURE WITH HYPOXIA (HCC): ICD-10-CM

## 2022-10-21 PROBLEM — G93.41 ACUTE METABOLIC ENCEPHALOPATHY: Status: ACTIVE | Noted: 2022-10-21

## 2022-10-21 PROBLEM — R56.9 SEIZURE-LIKE ACTIVITY (HCC): Status: ACTIVE | Noted: 2022-10-21

## 2022-10-21 LAB
ALBUMIN SERPL BCP-MCNC: 3.9 G/DL (ref 3.2–4.9)
ALBUMIN/GLOB SERPL: 1.6 G/DL
ALP SERPL-CCNC: 127 U/L (ref 30–99)
ALT SERPL-CCNC: 26 U/L (ref 2–50)
AMMONIA PLAS-SCNC: 26 UMOL/L (ref 11–45)
ANION GAP SERPL CALC-SCNC: 17 MMOL/L (ref 7–16)
APTT PPP: 25.7 SEC (ref 24.7–36)
AST SERPL-CCNC: 20 U/L (ref 12–45)
BASOPHILS # BLD AUTO: 0.8 % (ref 0–1.8)
BASOPHILS # BLD: 0.08 K/UL (ref 0–0.12)
BILIRUB SERPL-MCNC: 0.3 MG/DL (ref 0.1–1.5)
BUN SERPL-MCNC: 24 MG/DL (ref 8–22)
CALCIUM SERPL-MCNC: 9.1 MG/DL (ref 8.5–10.5)
CHLORIDE SERPL-SCNC: 104 MMOL/L (ref 96–112)
CO2 SERPL-SCNC: 17 MMOL/L (ref 20–33)
CREAT SERPL-MCNC: 1.19 MG/DL (ref 0.5–1.4)
EKG IMPRESSION: NORMAL
EOSINOPHIL # BLD AUTO: 0.24 K/UL (ref 0–0.51)
EOSINOPHIL NFR BLD: 2.5 % (ref 0–6.9)
ERYTHROCYTE [DISTWIDTH] IN BLOOD BY AUTOMATED COUNT: 40.8 FL (ref 35.9–50)
GFR SERPLBLD CREATININE-BSD FMLA CKD-EPI: 65 ML/MIN/1.73 M 2
GLOBULIN SER CALC-MCNC: 2.4 G/DL (ref 1.9–3.5)
GLUCOSE BLD STRIP.AUTO-MCNC: 185 MG/DL (ref 65–99)
GLUCOSE SERPL-MCNC: 200 MG/DL (ref 65–99)
HCT VFR BLD AUTO: 43.3 % (ref 42–52)
HGB BLD-MCNC: 14.5 G/DL (ref 14–18)
IMM GRANULOCYTES # BLD AUTO: 0.08 K/UL (ref 0–0.11)
IMM GRANULOCYTES NFR BLD AUTO: 0.8 % (ref 0–0.9)
INR PPP: 1.14 (ref 0.87–1.13)
LACTATE SERPL-SCNC: 2.5 MMOL/L (ref 0.5–2)
LACTATE SERPL-SCNC: 7.8 MMOL/L (ref 0.5–2)
LYMPHOCYTES # BLD AUTO: 2.35 K/UL (ref 1–4.8)
LYMPHOCYTES NFR BLD: 24.3 % (ref 22–41)
MCH RBC QN AUTO: 29.8 PG (ref 27–33)
MCHC RBC AUTO-ENTMCNC: 33.5 G/DL (ref 33.7–35.3)
MCV RBC AUTO: 89.1 FL (ref 81.4–97.8)
MONOCYTES # BLD AUTO: 0.93 K/UL (ref 0–0.85)
MONOCYTES NFR BLD AUTO: 9.6 % (ref 0–13.4)
NEUTROPHILS # BLD AUTO: 6.01 K/UL (ref 1.82–7.42)
NEUTROPHILS NFR BLD: 62 % (ref 44–72)
NRBC # BLD AUTO: 0 K/UL
NRBC BLD-RTO: 0 /100 WBC
PLATELET # BLD AUTO: 234 K/UL (ref 164–446)
PMV BLD AUTO: 10.7 FL (ref 9–12.9)
POTASSIUM SERPL-SCNC: 4.4 MMOL/L (ref 3.6–5.5)
PROT SERPL-MCNC: 6.3 G/DL (ref 6–8.2)
PROTHROMBIN TIME: 14.5 SEC (ref 12–14.6)
RBC # BLD AUTO: 4.86 M/UL (ref 4.7–6.1)
SODIUM SERPL-SCNC: 138 MMOL/L (ref 135–145)
TROPONIN T SERPL-MCNC: 8 NG/L (ref 6–19)
WBC # BLD AUTO: 9.7 K/UL (ref 4.8–10.8)

## 2022-10-21 PROCEDURE — 99220 PR INITIAL OBSERVATION CARE,LEVL III: CPT | Performed by: HOSPITALIST

## 2022-10-21 PROCEDURE — 84484 ASSAY OF TROPONIN QUANT: CPT

## 2022-10-21 PROCEDURE — 70450 CT HEAD/BRAIN W/O DYE: CPT

## 2022-10-21 PROCEDURE — 80053 COMPREHEN METABOLIC PANEL: CPT

## 2022-10-21 PROCEDURE — 700111 HCHG RX REV CODE 636 W/ 250 OVERRIDE (IP)

## 2022-10-21 PROCEDURE — G0378 HOSPITAL OBSERVATION PER HR: HCPCS

## 2022-10-21 PROCEDURE — 83605 ASSAY OF LACTIC ACID: CPT

## 2022-10-21 PROCEDURE — 700102 HCHG RX REV CODE 250 W/ 637 OVERRIDE(OP): Performed by: HOSPITALIST

## 2022-10-21 PROCEDURE — A9576 INJ PROHANCE MULTIPACK: HCPCS | Performed by: HOSPITALIST

## 2022-10-21 PROCEDURE — 96374 THER/PROPH/DIAG INJ IV PUSH: CPT

## 2022-10-21 PROCEDURE — 85025 COMPLETE CBC W/AUTO DIFF WBC: CPT

## 2022-10-21 PROCEDURE — A9270 NON-COVERED ITEM OR SERVICE: HCPCS | Performed by: HOSPITALIST

## 2022-10-21 PROCEDURE — 70553 MRI BRAIN STEM W/O & W/DYE: CPT

## 2022-10-21 PROCEDURE — 95819 EEG AWAKE AND ASLEEP: CPT | Mod: 26 | Performed by: PSYCHIATRY & NEUROLOGY

## 2022-10-21 PROCEDURE — 82140 ASSAY OF AMMONIA: CPT

## 2022-10-21 PROCEDURE — 36415 COLL VENOUS BLD VENIPUNCTURE: CPT

## 2022-10-21 PROCEDURE — 71045 X-RAY EXAM CHEST 1 VIEW: CPT

## 2022-10-21 PROCEDURE — 85610 PROTHROMBIN TIME: CPT

## 2022-10-21 PROCEDURE — 700117 HCHG RX CONTRAST REV CODE 255: Performed by: HOSPITALIST

## 2022-10-21 PROCEDURE — 87040 BLOOD CULTURE FOR BACTERIA: CPT | Mod: 91

## 2022-10-21 PROCEDURE — 82962 GLUCOSE BLOOD TEST: CPT

## 2022-10-21 PROCEDURE — 85730 THROMBOPLASTIN TIME PARTIAL: CPT

## 2022-10-21 PROCEDURE — 99285 EMERGENCY DEPT VISIT HI MDM: CPT

## 2022-10-21 PROCEDURE — 95819 EEG AWAKE AND ASLEEP: CPT | Performed by: PSYCHIATRY & NEUROLOGY

## 2022-10-21 PROCEDURE — 93005 ELECTROCARDIOGRAM TRACING: CPT | Performed by: EMERGENCY MEDICINE

## 2022-10-21 RX ORDER — TAMSULOSIN HYDROCHLORIDE 0.4 MG/1
0.4 CAPSULE ORAL DAILY
Status: DISCONTINUED | OUTPATIENT
Start: 2022-10-21 | End: 2022-10-22 | Stop reason: HOSPADM

## 2022-10-21 RX ORDER — ONDANSETRON 2 MG/ML
4 INJECTION INTRAMUSCULAR; INTRAVENOUS ONCE
Status: COMPLETED | OUTPATIENT
Start: 2022-10-21 | End: 2022-10-21

## 2022-10-21 RX ORDER — POLYETHYLENE GLYCOL 3350 17 G/17G
1 POWDER, FOR SOLUTION ORAL
Status: DISCONTINUED | OUTPATIENT
Start: 2022-10-21 | End: 2022-10-22 | Stop reason: HOSPADM

## 2022-10-21 RX ORDER — BISACODYL 10 MG
10 SUPPOSITORY, RECTAL RECTAL
Status: DISCONTINUED | OUTPATIENT
Start: 2022-10-21 | End: 2022-10-22 | Stop reason: HOSPADM

## 2022-10-21 RX ORDER — ONDANSETRON 4 MG/1
4 TABLET, ORALLY DISINTEGRATING ORAL EVERY 4 HOURS PRN
Status: DISCONTINUED | OUTPATIENT
Start: 2022-10-21 | End: 2022-10-22 | Stop reason: HOSPADM

## 2022-10-21 RX ORDER — LISINOPRIL 20 MG/1
40 TABLET ORAL DAILY
Status: DISCONTINUED | OUTPATIENT
Start: 2022-10-21 | End: 2022-10-22 | Stop reason: HOSPADM

## 2022-10-21 RX ORDER — AMLODIPINE BESYLATE 5 MG/1
10 TABLET ORAL DAILY
Status: DISCONTINUED | OUTPATIENT
Start: 2022-10-21 | End: 2022-10-22 | Stop reason: HOSPADM

## 2022-10-21 RX ORDER — ONDANSETRON 2 MG/ML
INJECTION INTRAMUSCULAR; INTRAVENOUS
Status: COMPLETED
Start: 2022-10-21 | End: 2022-10-21

## 2022-10-21 RX ORDER — ACETAMINOPHEN 325 MG/1
650 TABLET ORAL EVERY 6 HOURS PRN
Status: DISCONTINUED | OUTPATIENT
Start: 2022-10-21 | End: 2022-10-22 | Stop reason: HOSPADM

## 2022-10-21 RX ORDER — AMOXICILLIN 250 MG
2 CAPSULE ORAL 2 TIMES DAILY
Status: DISCONTINUED | OUTPATIENT
Start: 2022-10-21 | End: 2022-10-22 | Stop reason: HOSPADM

## 2022-10-21 RX ORDER — ATORVASTATIN CALCIUM 40 MG/1
40 TABLET, FILM COATED ORAL
Status: DISCONTINUED | OUTPATIENT
Start: 2022-10-21 | End: 2022-10-22 | Stop reason: HOSPADM

## 2022-10-21 RX ORDER — ONDANSETRON 2 MG/ML
4 INJECTION INTRAMUSCULAR; INTRAVENOUS EVERY 4 HOURS PRN
Status: DISCONTINUED | OUTPATIENT
Start: 2022-10-21 | End: 2022-10-22 | Stop reason: HOSPADM

## 2022-10-21 RX ADMIN — LISINOPRIL 40 MG: 20 TABLET ORAL at 07:35

## 2022-10-21 RX ADMIN — AMLODIPINE BESYLATE 10 MG: 5 TABLET ORAL at 07:35

## 2022-10-21 RX ADMIN — ONDANSETRON 4 MG: 2 INJECTION INTRAMUSCULAR; INTRAVENOUS at 03:22

## 2022-10-21 RX ADMIN — GADOTERIDOL 20 ML: 279.3 INJECTION, SOLUTION INTRAVENOUS at 10:25

## 2022-10-21 RX ADMIN — ONDANSETRON HYDROCHLORIDE 4 MG: 2 SOLUTION INTRAMUSCULAR; INTRAVENOUS at 03:22

## 2022-10-21 RX ADMIN — TAMSULOSIN HYDROCHLORIDE 0.4 MG: 0.4 CAPSULE ORAL at 07:34

## 2022-10-21 RX ADMIN — ATORVASTATIN CALCIUM 40 MG: 40 TABLET, FILM COATED ORAL at 20:46

## 2022-10-21 ASSESSMENT — ENCOUNTER SYMPTOMS
SHORTNESS OF BREATH: 0
MYALGIAS: 0
VOMITING: 0
NECK PAIN: 0
FEVER: 0
PALPITATIONS: 0
BLURRED VISION: 0
NAUSEA: 0
HEADACHES: 0
SORE THROAT: 0
WEAKNESS: 0
DEPRESSION: 0
INSOMNIA: 0
BRUISES/BLEEDS EASILY: 0
DOUBLE VISION: 0
DIZZINESS: 0
COUGH: 0

## 2022-10-21 ASSESSMENT — COGNITIVE AND FUNCTIONAL STATUS - GENERAL
SUGGESTED CMS G CODE MODIFIER DAILY ACTIVITY: CH
MOBILITY SCORE: 24
DAILY ACTIVITIY SCORE: 24
SUGGESTED CMS G CODE MODIFIER MOBILITY: CH

## 2022-10-21 ASSESSMENT — LIFESTYLE VARIABLES
EVER HAD A DRINK FIRST THING IN THE MORNING TO STEADY YOUR NERVES TO GET RID OF A HANGOVER: NO
AVERAGE NUMBER OF DAYS PER WEEK YOU HAVE A DRINK CONTAINING ALCOHOL: 0
ALCOHOL_USE: NO
HOW MANY TIMES IN THE PAST YEAR HAVE YOU HAD 5 OR MORE DRINKS IN A DAY: 0
ON A TYPICAL DAY WHEN YOU DRINK ALCOHOL HOW MANY DRINKS DO YOU HAVE: 0
TOTAL SCORE: 0
HAVE YOU EVER FELT YOU SHOULD CUT DOWN ON YOUR DRINKING: NO
EVER FELT BAD OR GUILTY ABOUT YOUR DRINKING: NO
TOTAL SCORE: 0
TOTAL SCORE: 0
HAVE PEOPLE ANNOYED YOU BY CRITICIZING YOUR DRINKING: NO
CONSUMPTION TOTAL: NEGATIVE

## 2022-10-21 ASSESSMENT — PATIENT HEALTH QUESTIONNAIRE - PHQ9
1. LITTLE INTEREST OR PLEASURE IN DOING THINGS: NOT AT ALL
2. FEELING DOWN, DEPRESSED, IRRITABLE, OR HOPELESS: NOT AT ALL
SUM OF ALL RESPONSES TO PHQ9 QUESTIONS 1 AND 2: 0

## 2022-10-21 ASSESSMENT — FIBROSIS 4 INDEX
FIB4 SCORE: 1.19
FIB4 SCORE: 1.11

## 2022-10-21 NOTE — PROGRESS NOTES
Patient was seen and examined at bedside.  Agree with the plan as written in the H&P this morning.  We will continue to monitor the patient overnight since he had a anoxic episode last night.  He will be monitored on telemetry.  If everything is unchanged likely discharge tomorrow.  He is successfully titrating down and is currently on 2 L.  He does have a history of severe sleep apnea and is awaiting sleep study.

## 2022-10-21 NOTE — DISCHARGE PLANNING
Patient recommended for home O2. LSW met with patient at bedside, LSW obtained DME choice form, faxed to [facility].

## 2022-10-21 NOTE — ASSESSMENT & PLAN NOTE
-Observation status on telemetry unit.  -Patient woke up screaming and shaking and then became very lethargic and confused.  This morning mentation is much improved.  -There is a high suspicious for new onset seizures based on presentation.  -I appreciate neurology consult and recommendations.  ERP discussed the case with Dr. Gilmore.  -Added MRI of the brain with and without contrast and EEG in the morning.

## 2022-10-21 NOTE — ED PROVIDER NOTES
ED Provider Note    CHIEF COMPLAINT  Chief Complaint   Patient presents with    ALOC     RUDY ALVES from home. Per wife, pt woke her up screaming then fell to the floor (assisted). Initial GCS 7, on arrival GCS 14.        HPI  Ravindra Raines is a 72 y.o. male who presents for an evaluation of altered level of consciousness.  Per EMS, the patient's wife called 911 because the patient woke up screaming and altered.  EMS found him to be altered with a GCS of 7 and hypoxic on room air.  He has slowly been recovering in route and is noted to be on 4 L now and saturating 92%.  Patient was not lateralizing when they first arrived and had no signs of trauma.  His vital signs, aside from his oxygen level, appeared normal.  He has had no recent trauma and no recent illnesses.  He was seen recently at a cardiology office yesterday and had medications adjusted but this appears to be metoprolol.  He is also on Flomax and a statin per EMS.  Patient knows his name and knows he is at [facility] but does not know the year or the situation.    REVIEW OF SYSTEMS  Unable to obtain reliable review of systems due to disorientation.    PAST FAM HISTORY  Family History   Problem Relation Age of Onset    Cancer Mother 40        br ca    Heart Disease Mother     Cancer Father     Cancer Brother 61        brain ca       PAST MEDICAL HISTORY   has a past medical history of Arrhythmia (09/02/2021) and BPH associated with nocturia.    SOCIAL HISTORY  Social History     Tobacco Use    Smoking status: Former     Types: Cigarettes     Start date: 1/8/1970    Smokeless tobacco: Never    Tobacco comments:     0.5 x 5 yrs, stopped at age 26   Vaping Use    Vaping Use: Never used   Substance and Sexual Activity    Alcohol use: Not Currently    Drug use: Never    Sexual activity: Yes     Partners: Female     Comment:        SURGICAL HISTORY   has a past surgical history that includes appendectomy (1990).    CURRENT MEDICATIONS  Home  "Medications       Reviewed by Florecita Kenney R.N. (Registered Nurse) on 10/21/22 at 0302  Med List Status: <None>     Medication Last Dose Status   amLODIPine (NORVASC) 10 MG Tab  Active   atorvastatin (LIPITOR) 40 MG Tab  Active   lisinopril (PRINIVIL) 40 MG tablet  Active   Multiple Vitamins-Minerals (DAILY MULTI VITAMIN/MINERALS PO)  Active   tamsulosin (FLOMAX) 0.4 MG capsule  Active   VITAMIN D PO  Active                    ALLERGIES  No Known Allergies    PHYSICAL EXAM  VITAL SIGNS: BP (!) 167/75   Pulse 72   Temp 36.6 °C (97.9 °F) (Temporal)   Resp 19   Ht 1.727 m (5' 8\")   Wt 97.5 kg (215 lb)   SpO2 93%   BMI 32.69 kg/m²    Gen: Appears tired but otherwise no apparent distress.  Able to follow some direction.  HEENT: No signs of trauma, Bilateral external ears normal, Nose normal. Conjunctiva normal, Non-icteric.  PERRLA, EOMI  Neck:  No tenderness, Supple, No masses  Lymphatic: No cervical lymphadenopathy noted.   Cardiovascular: Regular rate and rhythm, no murmurs.  Capillary refill less than 3 seconds to all extremities, 2+ distal pulses.  Thorax & Lungs: Normal breath sounds, No respiratory distress, No wheezing bilateral chest rise  Abdomen: Bowel sounds normal, Soft, No tenderness, No masses, No pulsatile masses. No Guarding or rebound  Skin: Warm, Dry, No erythema, No rash noted to exposed areas.   Back: No bony tenderness, No CVA tenderness.   Extremities: Intact distal pulses, No edema  Neurologic: ANO x2.  Appears tired, no facial droop.  Patient able to demonstrate excellent  strength bilaterally and can move lower extremities to command with good strength.  He states he has no numbness or tingling.  Psychiatric: Affect cooperative, calm      LABS  Results for orders placed or performed during the hospital encounter of 10/21/22   CBC WITH DIFFERENTIAL   Result Value Ref Range    WBC 9.7 4.8 - 10.8 K/uL    RBC 4.86 4.70 - 6.10 M/uL    Hemoglobin 14.5 14.0 - 18.0 g/dL    Hematocrit 43.3 " 42.0 - 52.0 %    MCV 89.1 81.4 - 97.8 fL    MCH 29.8 27.0 - 33.0 pg    MCHC 33.5 (L) 33.7 - 35.3 g/dL    RDW 40.8 35.9 - 50.0 fL    Platelet Count 234 164 - 446 K/uL    MPV 10.7 9.0 - 12.9 fL    Neutrophils-Polys 62.00 44.00 - 72.00 %    Lymphocytes 24.30 22.00 - 41.00 %    Monocytes 9.60 0.00 - 13.40 %    Eosinophils 2.50 0.00 - 6.90 %    Basophils 0.80 0.00 - 1.80 %    Immature Granulocytes 0.80 0.00 - 0.90 %    Nucleated RBC 0.00 /100 WBC    Neutrophils (Absolute) 6.01 1.82 - 7.42 K/uL    Lymphs (Absolute) 2.35 1.00 - 4.80 K/uL    Monos (Absolute) 0.93 (H) 0.00 - 0.85 K/uL    Eos (Absolute) 0.24 0.00 - 0.51 K/uL    Baso (Absolute) 0.08 0.00 - 0.12 K/uL    Immature Granulocytes (abs) 0.08 0.00 - 0.11 K/uL    NRBC (Absolute) 0.00 K/uL   COMP METABOLIC PANEL   Result Value Ref Range    Sodium 138 135 - 145 mmol/L    Potassium 4.4 3.6 - 5.5 mmol/L    Chloride 104 96 - 112 mmol/L    Co2 17 (L) 20 - 33 mmol/L    Anion Gap 17.0 (H) 7.0 - 16.0    Glucose 200 (H) 65 - 99 mg/dL    Bun 24 (H) 8 - 22 mg/dL    Creatinine 1.19 0.50 - 1.40 mg/dL    Calcium 9.1 8.5 - 10.5 mg/dL    AST(SGOT) 20 12 - 45 U/L    ALT(SGPT) 26 2 - 50 U/L    Alkaline Phosphatase 127 (H) 30 - 99 U/L    Total Bilirubin 0.3 0.1 - 1.5 mg/dL    Albumin 3.9 3.2 - 4.9 g/dL    Total Protein 6.3 6.0 - 8.2 g/dL    Globulin 2.4 1.9 - 3.5 g/dL    A-G Ratio 1.6 g/dL   TROPONIN   Result Value Ref Range    Troponin T 8 6 - 19 ng/L   LACTIC ACID   Result Value Ref Range    Lactic Acid 7.8 (HH) 0.5 - 2.0 mmol/L   PROTHROMBIN TIME (INR)   Result Value Ref Range    PT 14.5 12.0 - 14.6 sec    INR 1.14 (H) 0.87 - 1.13   APTT   Result Value Ref Range    APTT 25.7 24.7 - 36.0 sec   ESTIMATED GFR   Result Value Ref Range    GFR (CKD-EPI) 65 >60 mL/min/1.73 m 2   LACTIC ACID   Result Value Ref Range    Lactic Acid 2.5 (H) 0.5 - 2.0 mmol/L   EKG (NOW)   Result Value Ref Range    Report       [facility]    Test Date:  2022-10-21  Pt Name:     PRABHA MONTANA                  Department: ER  MRN:        [id number]                      Room:        22  Gender:     Male                         Technician: [id number]  :        1950                   Requested By:VU SINGH  Order #:    [id number]                    Reading MD:    Measurements  Intervals                                Axis  Rate:       80                           P:          -12  NC:         227                          QRS:        -14  QRSD:       77                           T:          52  QT:         390  QTc:        450    Interpretive Statements  Sinus rhythm  Prolonged NC interval  Abnormal R-wave progression, early transition  Left ventricular hypertrophy  Inferior infarct, old  Anterior Q waves, possibly due to LVH  Compared to ECG 2021 10:12:35  First degree AV block now present  Myocardial infarct finding now present  Q waves now present  Sinus bradycardia  no longer present  Ventricular premature complex(es) no longer present     POCT glucose device results   Result Value Ref Range    POC Glucose, Blood 185 (H) 65 - 99 mg/dL       RADIOLOGY  DX-CHEST-PORTABLE (1 VIEW)   Final Result         1.  Linear left midlung and lung base atelectasis or scarring, no focal infiltrate   2.  Cardiomegaly      CT-HEAD W/O   Final Result         1.  No acute intracranial abnormality is identified, there are nonspecific white matter changes, commonly associated with small vessel ischemic disease.  Associated mild cerebral atrophy is noted.   2.  Left maxillary sinusitis changes   3.  Atherosclerosis.         MR-BRAIN-WITH & W/O    (Results Pending)     Critical Care Note  Upon my evaluation, this patient had high probability of imminent and life-threatening deterioration due to prolonged postictal state, metabolic encephalopathy, likely seizure, hypoxemia, which required my direct attention, intervention, and personal management. I personally provided 35 minutes of critical care time  "exclusive of time spent on separately billable procedures. Time includes review of laboratory data, radiology results, discussion with consultants, and monitoring for potential decompensation.      COURSE & MEDICAL DECISION MAKING  Patient arrives for evaluation of altered level consciousness.  He was last seen normal around 11 PM last night when he went to bed with his wife.  He woke up screaming for some reason.  He was altered and hypoxic for EMS.  Both these issues are currently resolving and there is no obvious findings to suggest a source.  Patient is not on any medications that would lower the seizure threshold but the episodes sounds most consistent with a seizure.  He did not apparently bite his tongue and did not lose bowel or bladder control.  He is calm but only oriented x2 so far.  I expect this will resolve given time however he will need to CT his head and perform a broad screening evaluation for metabolic causes.    Case was briefly discussed with the on-call neurologist by text, he agrees with the plan for MRI with and without contrast as well as EEG this morning.  We will hold off on antiseizure medications as it may interfere with the EEG.  I did reevaluate patient at bedside and he is calm but still confused and actually called his wife a different name.  She states he is \"not right\" and agrees with plan for hospitalization and further evaluation.  Case was then discussed with hospitalist who will evaluate the patient in the emergency department.    FINAL IMPRESSION  1. Altered mental status, unspecified altered mental status type    2. Hypoxemia        Electronically signed by: Kwasi Messer M.D., 10/21/2022 3:03 AM   "

## 2022-10-21 NOTE — PROGRESS NOTES
Received from ED via gurney, able to ambulate steady to BR and bed, AAOx4, denies any discomfort, desats to 85% on RA, placed on 2l per NC, IS 2,500,encouraged use of IS, EEG at bedside, needs attended.

## 2022-10-21 NOTE — PROGRESS NOTES
O2 sats down to 85% on 2l per nC while sleeping with periods of apnea, placed on simple mask, will monitor.

## 2022-10-21 NOTE — ASSESSMENT & PLAN NOTE
-On admission glucose is 200.  This is likely reactive but he also has most recent hemoglobin A1c in July of 5.8.  -Will not correct this right now given that there was likely an underlying recent seizure but he might need sugar corrections.  -Closely monitor chemistry panel.

## 2022-10-21 NOTE — PROCEDURES
VIDEO ELECTROENCEPHALOGRAM REPORT      Referring provider: Dr. Caceres    DOS: 10/21/22 (total recording of 24 minutes).     INDICATION:  Ravindra Raines 72 y.o. male presenting with altered mental status     CURRENT ANTIEPILEPTIC REGIMEN: none     TECHNIQUE: 30 channel video electroencephalogram (EEG) was performed in accordance with the international 10-20 system. The study was reviewed in bipolar and referential montages. The recording examined the patient during very brief  awake, drowsy and sleep states    DESCRIPTION OF THE RECORD:  During the very brief wakefulness, the background showed a symmetrical 8.5  Hz alpha activity posteriorly with amplitude of 70 mV.  There was reactivity to eye closure/opening.  A normal anterior-posterior gradient was noted with faster beta frequencies seen anteriorly.  During drowsiness, increased theta/beta frequencies were seen.    During the sleep state,symmetrical sleep spindles and vertex sharps were seen in the leads over the central regions. No slow wave stage seen.     ACTIVATION PROCEDURES:     hyperventilation was not performed    Intermittent Photic stimulation was performed in a stepwise fashion from 1 to 30 Hz and elicited a normal response (photic driving), most noticeable in the posterior leads.    ICTAL AND/OR INTERICTAL FINDINGS:   No focal or generalized epileptiform activity noted. No regional slowing was seen during this routine study.  No clinical events or seizures were reported or recorded during the study.     EKG: sampling of the EKG recording demonstrated sinus rhythm.     EVENTS: none     INTERPRETATION:    This is a normal video EEG recording in the awake, drowsy and sleep states.  Note: A normal EEG does not rule out epilepsy.  If the clinical suspicion remains high for seizures, a prolonged recording to capture clinical or subclinical events may be helpful.    Landon Davies MD  Diplomate in Neurology&Epilepsy  Office: [phone]  Fax: [phone]

## 2022-10-21 NOTE — ED NOTES
Patient remains comfortable on gurney, no identifiable needs at this time. Equal chest rise and fall bilaterally, pt connected to cardiac monitor. Pending orders, family at bedside. Safety measures in place, call light within reach.

## 2022-10-21 NOTE — ASSESSMENT & PLAN NOTE
-Per wife description patient had seizure-like activity.  Acute metabolic encephalopathy could be postictal state.  -ERP discussed the case with neurologist, Dr. Gilmore.  Mentation is progressively improving overnight.  -Added MRI of the brain with and without contrast and EEG in the morning.  -I appreciate neurology consult and recommendations.

## 2022-10-21 NOTE — H&P
Hospital Medicine History & Physical Note    Date of Service  10/21/2022    Primary Care Physician  GRAHAM [facility].    Consultants  neurology    Specialist Names: ERP discussed this case with Dr. Gilmore    Code Status  No Order    Chief Complaint  Chief Complaint   Patient presents with    FRAN ALVES from home. Per wife, pt woke her up screaming then fell to the floor (assisted). Initial GCS 7, on arrival GCS 14.        History of Presenting Illness  At the time of my evaluation, patient was unable to remember any of the events precipitating arrival to the hospital, making him a poor historian in terms of describing overnight events    Ravindra Raines is a 72 y.o. male, with history of HTN, HLD and BPH who, per wife report woke up in the middle of the night screaming and shaking and shortly becoming confused and altered presented to the emergency department via EMS on 10/21/2022.  On initial evaluation by EMS his GCS was 7 and he was hypoxic on room air.  There is no signs of trauma and initially he was on 4 L of oxygen via nasal cannula.  No history of similar symptoms.  There has being no focal weakness or slurred speech.  Patient denied having headache he has been intermittently overnight, but this morning able to fully answer all my questions appropriately.  No prior history of seizures or having similar event.    CT scan of the head was negative for acute intracranial abnormalities.  With suspicion for seizure event, ERP discussed this case with neurology on-call Dr. Gilmore.  Recommendation was to bring patient under observation and do work-up for encephalopathy with MRI of the brain with and without contrast and EEG in the morning, reason why I was called for admission    I discussed the plan of care with patient.    Review of Systems  Review of Systems   Constitutional:  Negative for fever.        Confusion as per HPI.   HENT:  Negative for congestion and sore throat.    Eyes:   Negative for blurred vision and double vision.   Respiratory:  Negative for cough and shortness of breath.    Cardiovascular:  Negative for chest pain and palpitations.   Gastrointestinal:  Negative for nausea and vomiting.   Genitourinary:  Negative for dysuria and urgency.   Musculoskeletal:  Negative for myalgias and neck pain.   Skin:  Negative for itching and rash.   Neurological:  Negative for dizziness, weakness and headaches.   Endo/Heme/Allergies:  Does not bruise/bleed easily.   Psychiatric/Behavioral:  Negative for depression. The patient does not have insomnia.      Past Medical History   has a past medical history of Arrhythmia (09/02/2021) and BPH associated with nocturia.    Surgical History   has a past surgical history that includes appendectomy (1990).     Family History  family history includes Cancer in his father; Cancer (age of onset: 40) in his mother; Cancer (age of onset: 61) in his brother; Heart Disease in his mother.   Family history reviewed with patient. There is no family history that is pertinent to the chief complaint.     Social History   reports that he has quit smoking. He started smoking about 52 years ago. He has never used smokeless tobacco. He reports that he does not currently use alcohol. He reports that he does not use drugs.    Allergies  No Known Allergies    Medications  Prior to Admission Medications   Prescriptions Last Dose Informant Patient Reported? Taking?   Multiple Vitamins-Minerals (DAILY MULTI VITAMIN/MINERALS PO)  Patient Yes No   Sig: Take 1 Tablet by mouth every day.   VITAMIN D PO  Patient Yes No   Sig: Take 1 Tablet by mouth every day.   amLODIPine (NORVASC) 10 MG Tab   No No   Sig: TAKE 1 TABLET BY MOUTH EVERY DAY   atorvastatin (LIPITOR) 40 MG Tab   No No   Sig: Take 1 Tablet by mouth at bedtime.   lisinopril (PRINIVIL) 40 MG tablet   No No   Sig: Take 1 Tablet by mouth every day.   tamsulosin (FLOMAX) 0.4 MG capsule   No No   Sig: Take 1 Capsule by  mouth every day.      Facility-Administered Medications: None       Physical Exam  Temp:  [36.6 °C (97.9 °F)] 36.6 °C (97.9 °F)  Pulse:  [66-86] 66  Resp:  [12-18] 15  BP: (122-124)/(56-60) 122/58  SpO2:  [91 %-97 %] 97 %  Blood Pressure : 122/58   Temperature: 36.6 °C (97.9 °F)   Pulse: 66   Respiration: 15   Pulse Oximetry: 97 %       Physical Exam  Constitutional:       Appearance: Normal appearance.   HENT:      Head: Normocephalic and atraumatic.      Nose: Nose normal.      Mouth/Throat:      Mouth: Mucous membranes are moist.      Pharynx: Oropharynx is clear.   Eyes:      Extraocular Movements: Extraocular movements intact.      Pupils: Pupils are equal, round, and reactive to light.   Cardiovascular:      Rate and Rhythm: Normal rate and regular rhythm.      Pulses: Normal pulses.      Heart sounds: Normal heart sounds.   Pulmonary:      Effort: Pulmonary effort is normal.      Breath sounds: Normal breath sounds.   Abdominal:      General: Abdomen is flat. Bowel sounds are normal.      Palpations: Abdomen is soft.   Musculoskeletal:      Cervical back: Normal range of motion and neck supple.   Skin:     General: Skin is warm and dry.   Neurological:      General: No focal deficit present.      Mental Status: He is alert and oriented to person, place, and time.      Comments: Speech is normal, strength is 5/5 for both upper and lower extremity.  Cranial nerves are grossly intact.   Psychiatric:         Mood and Affect: Mood normal.         Behavior: Behavior normal.       Laboratory:  Recent Labs     10/21/22  0302   WBC 9.7   RBC 4.86   HEMOGLOBIN 14.5   HEMATOCRIT 43.3   MCV 89.1   MCH 29.8   MCHC 33.5*   RDW 40.8   PLATELETCT 234   MPV 10.7     Recent Labs     10/21/22  0302   SODIUM 138   POTASSIUM 4.4   CHLORIDE 104   CO2 17*   GLUCOSE 200*   BUN 24*   CREATININE 1.19   CALCIUM 9.1     Recent Labs     10/21/22  0302   ALTSGPT 26   ASTSGOT 20   ALKPHOSPHAT 127*   TBILIRUBIN 0.3   GLUCOSE 200*     Recent  Labs     10/21/22  0302   APTT 25.7   INR 1.14*     No results for input(s): NTPROBNP in the last 72 hours.      Recent Labs     10/21/22  0302   TROPONINT 8       Imaging:  DX-CHEST-PORTABLE (1 VIEW)   Final Result         1.  Linear left midlung and lung base atelectasis or scarring, no focal infiltrate   2.  Cardiomegaly      CT-HEAD W/O   Final Result         1.  No acute intracranial abnormality is identified, there are nonspecific white matter changes, commonly associated with small vessel ischemic disease.  Associated mild cerebral atrophy is noted.   2.  Left maxillary sinusitis changes   3.  Atherosclerosis.         MR-BRAIN-WITH & W/O    (Results Pending)       EKG:  My impression is: NSR at 80 bpm, left axis deviation and no acute ST elevation.  First-degree AV block with [place] of 227    Assessment/Plan:  Justification for Admission Status  I anticipate this patient is appropriate for observation status at this time because acute metabolic encephalopathy and possible new onset seizures        * Acute metabolic encephalopathy- (present on admission)  Assessment & Plan  -Observation status on telemetry unit.  -Patient woke up screaming and shaking and then became very lethargic and confused.  This morning mentation is much improved.  -There is a high suspicious for new onset seizures based on presentation.  -I appreciate neurology consult and recommendations.  ERP discussed the case with Dr. Gilmore.  -Added MRI of the brain with and without contrast and EEG in the morning.    Seizure-like activity (HCC)  Assessment & Plan  -Per wife description patient had seizure-like activity.  Acute metabolic encephalopathy could be postictal state.  -ERP discussed the case with neurologist, Dr. Gilmore.  Mentation is progressively improving overnight.  -Added MRI of the brain with and without contrast and EEG in the morning.  -I appreciate neurology consult and recommendations.    Elevated hemoglobin A1c- (present on  admission)  Assessment & Plan  -On admission glucose is 200.  This is likely reactive but he also has most recent hemoglobin A1c in July of 5.8.  -Will not correct this right now given that there was likely an underlying recent seizure but he might need sugar corrections.  -Closely monitor chemistry panel.    HTN (hypertension)- (present on admission)  Assessment & Plan  -Continue lisinopril and amlodipine.    Hyperlipidemia- (present on admission)  Assessment & Plan  Continue atorvastatin.    BPH associated with nocturia- (present on admission)  Assessment & Plan  -Continue tamsulosin.      VTE prophylaxis: SCDs/TEDs

## 2022-10-21 NOTE — ED TRIAGE NOTES
Ravindra Allen Olu  72 y.o. male  Chief Complaint   Patient presents with   • ALOC     BIB [facility] from home. Per wife, pt woke her up screaming then fell to the floor (assisted). Initial GCS 7, on arrival GCS 14.      BIB [facility] from home for above. Pt woke up screaming and completely altered with a GCS of 7. Improved to 10 and then on arrival 13. Pt arrives on 10L NRB. Placed on 5L oxymask. Additional PIV placed, ERP at bedside. Care assumed.

## 2022-10-21 NOTE — ED NOTES
Transport at bedside to transport to S1. Report given. Pt transported on O2. All belongings with pt at time of transport, care relinquished.

## 2022-10-21 NOTE — ED NOTES
Blood cultures unable to be obtained from PIV or straight stick, phlebotomist Oliverio notified at 0606 for draw.    Report given to S1 Neurosciences RN. Transport aware for .

## 2022-10-21 NOTE — CARE PLAN
Problem: Knowledge Deficit - Standard  Goal: Patient and family/care givers will demonstrate understanding of plan of care, disease process/condition, diagnostic tests and medications  Outcome: Progressing  Note: Encouraged use of IS, EEG ordered   The patient is Stable - Low risk of patient condition declining or worsening         Progress made toward(s) clinical / shift goals:  EEG, neuro checks and lab results    Patient is not progressing towards the following goals:

## 2022-10-22 ENCOUNTER — APPOINTMENT (OUTPATIENT)
Dept: RADIOLOGY | Facility: MEDICAL CENTER | Age: 72
End: 2022-10-22
Attending: STUDENT IN AN ORGANIZED HEALTH CARE EDUCATION/TRAINING PROGRAM
Payer: MEDICARE

## 2022-10-22 VITALS
TEMPERATURE: 99 F | SYSTOLIC BLOOD PRESSURE: 127 MMHG | DIASTOLIC BLOOD PRESSURE: 49 MMHG | HEIGHT: 68 IN | OXYGEN SATURATION: 93 % | HEART RATE: 68 BPM | WEIGHT: 213.19 LBS | BODY MASS INDEX: 32.31 KG/M2 | RESPIRATION RATE: 17 BRPM

## 2022-10-22 LAB
AMPHET UR QL SCN: NEGATIVE
ANION GAP SERPL CALC-SCNC: 10 MMOL/L (ref 7–16)
APPEARANCE UR: CLEAR
BARBITURATES UR QL SCN: NEGATIVE
BENZODIAZ UR QL SCN: NEGATIVE
BILIRUB UR QL STRIP.AUTO: NEGATIVE
BUN SERPL-MCNC: 25 MG/DL (ref 8–22)
BZE UR QL SCN: NEGATIVE
CALCIUM SERPL-MCNC: 8.9 MG/DL (ref 8.5–10.5)
CANNABINOIDS UR QL SCN: NEGATIVE
CHLORIDE SERPL-SCNC: 106 MMOL/L (ref 96–112)
CO2 SERPL-SCNC: 23 MMOL/L (ref 20–33)
COLOR UR: YELLOW
CREAT SERPL-MCNC: 1.22 MG/DL (ref 0.5–1.4)
ERYTHROCYTE [DISTWIDTH] IN BLOOD BY AUTOMATED COUNT: 41.8 FL (ref 35.9–50)
FLUAV RNA SPEC QL NAA+PROBE: NEGATIVE
FLUBV RNA SPEC QL NAA+PROBE: NEGATIVE
GFR SERPLBLD CREATININE-BSD FMLA CKD-EPI: 63 ML/MIN/1.73 M 2
GLUCOSE SERPL-MCNC: 115 MG/DL (ref 65–99)
GLUCOSE UR STRIP.AUTO-MCNC: NEGATIVE MG/DL
HCT VFR BLD AUTO: 40.5 % (ref 42–52)
HGB BLD-MCNC: 13.4 G/DL (ref 14–18)
KETONES UR STRIP.AUTO-MCNC: NEGATIVE MG/DL
LEUKOCYTE ESTERASE UR QL STRIP.AUTO: NEGATIVE
MCH RBC QN AUTO: 30 PG (ref 27–33)
MCHC RBC AUTO-ENTMCNC: 33.1 G/DL (ref 33.7–35.3)
MCV RBC AUTO: 90.6 FL (ref 81.4–97.8)
METHADONE UR QL SCN: NEGATIVE
MICRO URNS: NORMAL
NITRITE UR QL STRIP.AUTO: NEGATIVE
OPIATES UR QL SCN: NEGATIVE
OXYCODONE UR QL SCN: NEGATIVE
PCP UR QL SCN: NEGATIVE
PH UR STRIP.AUTO: 5 [PH] (ref 5–8)
PLATELET # BLD AUTO: 191 K/UL (ref 164–446)
PMV BLD AUTO: 11 FL (ref 9–12.9)
POTASSIUM SERPL-SCNC: 4 MMOL/L (ref 3.6–5.5)
PROPOXYPH UR QL SCN: NEGATIVE
PROT UR QL STRIP: NEGATIVE MG/DL
RBC # BLD AUTO: 4.47 M/UL (ref 4.7–6.1)
RBC UR QL AUTO: NEGATIVE
RSV RNA SPEC QL NAA+PROBE: NEGATIVE
SARS-COV-2 RNA RESP QL NAA+PROBE: NOTDETECTED
SODIUM SERPL-SCNC: 139 MMOL/L (ref 135–145)
SP GR UR STRIP.AUTO: 1.02
SPECIMEN SOURCE: NORMAL
UROBILINOGEN UR STRIP.AUTO-MCNC: 0.2 MG/DL
WBC # BLD AUTO: 9.8 K/UL (ref 4.8–10.8)

## 2022-10-22 PROCEDURE — G0378 HOSPITAL OBSERVATION PER HR: HCPCS

## 2022-10-22 PROCEDURE — 81003 URINALYSIS AUTO W/O SCOPE: CPT

## 2022-10-22 PROCEDURE — A9270 NON-COVERED ITEM OR SERVICE: HCPCS | Performed by: HOSPITALIST

## 2022-10-22 PROCEDURE — 36415 COLL VENOUS BLD VENIPUNCTURE: CPT

## 2022-10-22 PROCEDURE — C9803 HOPD COVID-19 SPEC COLLECT: HCPCS | Performed by: STUDENT IN AN ORGANIZED HEALTH CARE EDUCATION/TRAINING PROGRAM

## 2022-10-22 PROCEDURE — 71275 CT ANGIOGRAPHY CHEST: CPT

## 2022-10-22 PROCEDURE — 700117 HCHG RX CONTRAST REV CODE 255: Performed by: STUDENT IN AN ORGANIZED HEALTH CARE EDUCATION/TRAINING PROGRAM

## 2022-10-22 PROCEDURE — 0241U HCHG SARS-COV-2 COVID-19 NFCT DS RESP RNA 4 TRGT MIC: CPT

## 2022-10-22 PROCEDURE — 700102 HCHG RX REV CODE 250 W/ 637 OVERRIDE(OP): Performed by: HOSPITALIST

## 2022-10-22 PROCEDURE — 99217 PR OBSERVATION CARE DISCHARGE: CPT | Performed by: STUDENT IN AN ORGANIZED HEALTH CARE EDUCATION/TRAINING PROGRAM

## 2022-10-22 PROCEDURE — 85027 COMPLETE CBC AUTOMATED: CPT

## 2022-10-22 PROCEDURE — 80048 BASIC METABOLIC PNL TOTAL CA: CPT

## 2022-10-22 PROCEDURE — 80307 DRUG TEST PRSMV CHEM ANLYZR: CPT

## 2022-10-22 RX ADMIN — LISINOPRIL 40 MG: 20 TABLET ORAL at 06:25

## 2022-10-22 RX ADMIN — SENNOSIDES AND DOCUSATE SODIUM 2 TABLET: 50; 8.6 TABLET ORAL at 06:25

## 2022-10-22 RX ADMIN — AMLODIPINE BESYLATE 10 MG: 5 TABLET ORAL at 06:25

## 2022-10-22 RX ADMIN — IOHEXOL 75 ML: 350 INJECTION, SOLUTION INTRAVENOUS at 12:30

## 2022-10-22 RX ADMIN — TAMSULOSIN HYDROCHLORIDE 0.4 MG: 0.4 CAPSULE ORAL at 06:25

## 2022-10-22 NOTE — PROGRESS NOTES
Pt discharged per MD orders. IV removed.  Belongings gathered/returned to patient. Discharge instructions, medications and follow up appointments reviewed with patient and spouse. Home 02 will be delivered to home, per COLE Ba (pt needs it nocturnally until he can see sleep specialist). Pt and spouse deny any further questions.

## 2022-10-22 NOTE — FACE TO FACE
"Face to Face Note  -  Durable Medical Equipment    Dale Caceres D.O. - NPI: [id number]  I certify that this patient is under my care and that they had a durable medical equipment(DME)face to face encounter by myself that meets the physician DME face-to-face encounter requirements with this patient on:    Date of encounter:   Patient:                    MRN:                       YOB: 2021  Ravindra Raines  [id number]  1950     The encounter with the patient was in whole, or in part, for the following medical condition, which is the primary reason for durable medical equipment:  Other -   acute respiratory failure with hypoxia    I certify that, based on my findings, the following durable medical equipment is medically necessary:    Oxygen   HOME O2 Saturation Measurements:(Values must be present for Home Oxygen orders)  Room air sat at rest: 70  Room air sat with amb: 87  With liters of O2: 2, O2 sat at rest with O2: 90  With Liters of O2: 2, O2 sat with amb with O2 : 90  Is the patient mobile?: Yes  If patient feels more short of breath, they can go up to 6 liters per minute and contact healthcare provider.    Supporting Symptoms: The patient requires supplemental oxygen, as the following interventions have been tried with limited or no improvement: \"Positive expiratory pressure therapies, \"Ambulation with oximetry, and \"Incentive spirometry.    My Clinical findings support the need for the above equipment due to:  Hypoxia  "

## 2022-10-22 NOTE — DISCHARGE PLANNING
Received Choice form at [phone]  Agency/Facility Name: [facility]  Referral sent per Choice form @ [phone]

## 2022-10-22 NOTE — DISCHARGE PLANNING
Agency/Facility Name: [facility]  Spoke To: Carter  Outcome: Referral accepted.  O2 will be delivered within an hour.

## 2022-10-22 NOTE — PROGRESS NOTES
Monitor summary: SR/SB, HR 55-67, DE 0.23, QRS 0.08, QT 0.43 with (R) PVCs per strip from monitor room

## 2022-10-22 NOTE — CARE PLAN
The patient is Stable - Low risk of patient condition declining or worsening    Shift Goals  Clinical Goals: Improve 02 needs  Patient Goals: Go home  Family Goals: YESSENIA    Progress made toward(s) clinical / shift goals:     Problem: Knowledge Deficit - Standard  Goal: Patient and family/care givers will demonstrate understanding of plan of care, disease process/condition, diagnostic tests and medications  Outcome: Progressing  Pt educated on use of 02 mask when sleeping and POC for the day.       Patient is not progressing towards the following goals: N/A

## 2022-10-22 NOTE — CARE PLAN
The patient is Watcher - Medium risk of patient condition declining or worsening    Shift Goals  Clinical Goals: monitor O2 & neuro checks  Patient Goals: sleep  Family Goals: YESSENIA    Progress made toward(s) clinical / shift goals:  94% O2 sat on 2L via oxy mask.desaturates to 84% O2 sat while sleeping on room air    Patient is not progressing towards the following goals:

## 2022-10-22 NOTE — DISCHARGE SUMMARY
Discharge Summary    CHIEF COMPLAINT ON ADMISSION  Chief Complaint   Patient presents with    ALOC     RUDY ALVES from home. Per wife, pt woke her up screaming then fell to the floor (assisted). Initial GCS 7, on arrival GCS 14.        Reason for Admission  ems     Admission Date  10/21/2022    CODE STATUS  Full Code    HPI & HOSPITAL COURSE  At the time of my evaluation, patient was unable to remember any of the events precipitating arrival to the hospital, making him a poor historian in terms of describing overnight events     Ravindra Raines is a 72 y.o. male, with history of HTN, HLD and BPH who, per wife report woke up in the middle of the night screaming and shaking and shortly becoming confused and altered presented to the emergency department via EMS on 10/21/2022.  On initial evaluation by EMS his GCS was 7 and he was hypoxic on room air.  There is no signs of trauma and initially he was on 4 L of oxygen via nasal cannula.  No history of similar symptoms.  There has being no focal weakness or slurred speech.  Patient denied having headache he has been intermittently overnight, but this morning able to fully answer all my questions appropriately.  No prior history of seizures or having similar event.     CT scan of the head was negative for acute intracranial abnormalities.  With suspicion for seizure event, ERP discussed this case with neurology on-call Dr. Gilmore.  Recommendation was to bring patient under observation and do work-up for encephalopathy with MRI of the brain with and without contrast and EEG in the morning, reason why I was called for admission    No overnight events on telemetry.  Patient was noted to desat to the 70s likely secondary to sleep apnea.  Patient will need to follow-up with a sleep specialist.  The CTA was negative.  Patient is also COVID-negative.    Therefore, he is discharged in good and stable condition to home with close outpatient follow-up.    The patient recovered  much more quickly than anticipated on admission.    Discharge Date  10/22/22    FOLLOW UP ITEMS POST DISCHARGE  Acute metabolic encephalopathy  Acute respiratory failure with hypoxia    DISCHARGE DIAGNOSES  Principal Problem:    Acute metabolic encephalopathy POA: Yes  Active Problems:    BPH associated with nocturia POA: Yes    Hyperlipidemia POA: Yes    HTN (hypertension) POA: Yes      Overview: FH of MI in dad at 78    Elevated hemoglobin A1c POA: Yes    Seizure-like activity (HCC) POA: Unknown  Resolved Problems:    * No resolved hospital problems. *      FOLLOW UP  Future Appointments   Date Time Provider Department Center   12/6/2022 10:20 AM [facility]     No follow-up provider specified.    MEDICATIONS ON DISCHARGE     Medication List        CONTINUE taking these medications        Instructions   amLODIPine 10 MG Tabs  Commonly known as: NORVASC   TAKE 1 TABLET BY MOUTH EVERY DAY  Dose: 10 mg     atorvastatin 40 MG Tabs  Commonly known as: LIPITOR   Take 1 Tablet by mouth at bedtime.  Dose: 40 mg     DAILY MULTI VITAMIN/MINERALS PO   Take 1 Tablet by mouth every day.  Dose: 1 Tablet     lisinopril 40 MG tablet  Commonly known as: PRINIVIL   Take 1 Tablet by mouth every day.  Dose: 40 mg     tamsulosin 0.4 MG capsule  Commonly known as: FLOMAX   Take 1 Capsule by mouth every day.  Dose: 0.4 mg     VITAMIN D PO   Take 1 Tablet by mouth every day.  Dose: 1 Tablet              Allergies  No Known Allergies    DIET  Orders Placed This Encounter   Procedures    Diet Order Diet: Cardiac     Standing Status:   Standing     Number of Occurrences:   1     Order Specific Question:   Diet:     Answer:   Cardiac [6]       ACTIVITY  As tolerated.  Weight bearing as tolerated    CONSULTATIONS  none    PROCEDURES  none    LABORATORY  Lab Results   Component Value Date    SODIUM 139 10/22/2022    POTASSIUM 4.0 10/22/2022    CHLORIDE 106 10/22/2022    CO2 23 10/22/2022    GLUCOSE 115 (H)  10/22/2022    BUN 25 (H) 10/22/2022    CREATININE 1.22 10/22/2022        Lab Results   Component Value Date    WBC 9.8 10/22/2022    HEMOGLOBIN 13.4 (L) 10/22/2022    HEMATOCRIT 40.5 (L) 10/22/2022    PLATELETCT 191 10/22/2022        Total time of the discharge process exceeds 35 minutes.

## 2022-10-22 NOTE — PROGRESS NOTES
Assumed care of pt at 0700. Pt alert and oriented x4. Denies any pain or discomfort. He requires 2L oxy mask at night. Night RN verbalized pt's 02 can go as low as 70% without it. During the day, while awake, 02 saturation is WDL. Pt denies any respiratory complaints. Covid/flu swab sent to lab per order. Bed low and locked, alarm set and call bell within reach. Hourly rounding continues.

## 2022-10-24 ENCOUNTER — PATIENT OUTREACH (OUTPATIENT)
Dept: MEDICAL GROUP | Facility: PHYSICIAN GROUP | Age: 72
End: 2022-10-24
Payer: MEDICARE

## 2022-10-25 NOTE — PROGRESS NOTES
This patient qualifies for a Transitional Care Management visit as they are being seen within the required time and two attempts were made to contact this patient within two business days to review discharge per CMS guidelines.  You therefore can see them as a TCM visit and charge appropriately.    Coding guide  08485      -face-to-face within 14 days      -moderate decision complexity (LACE+ score of 28-58)  62030      -face-to-face within 7 days      -high medical decision complexity (LACE+ score 59+)

## 2022-10-26 ENCOUNTER — OFFICE VISIT (OUTPATIENT)
Dept: MEDICAL GROUP | Facility: PHYSICIAN GROUP | Age: 72
End: 2022-10-26
Payer: MEDICARE

## 2022-10-26 VITALS — BODY MASS INDEX: 31.58 KG/M2 | WEIGHT: 208.4 LBS | RESPIRATION RATE: 16 BRPM | HEIGHT: 68 IN

## 2022-10-26 DIAGNOSIS — R35.0 URINE FREQUENCY: ICD-10-CM

## 2022-10-26 DIAGNOSIS — N40.1 BENIGN PROSTATIC HYPERPLASIA WITH URINARY FREQUENCY: ICD-10-CM

## 2022-10-26 DIAGNOSIS — K21.9 GASTROESOPHAGEAL REFLUX DISEASE WITHOUT ESOPHAGITIS: ICD-10-CM

## 2022-10-26 DIAGNOSIS — D50.9 IRON DEFICIENCY ANEMIA, UNSPECIFIED IRON DEFICIENCY ANEMIA TYPE: ICD-10-CM

## 2022-10-26 DIAGNOSIS — R35.0 BENIGN PROSTATIC HYPERPLASIA WITH URINARY FREQUENCY: ICD-10-CM

## 2022-10-26 DIAGNOSIS — G47.00 FREQUENT NOCTURNAL AWAKENING: ICD-10-CM

## 2022-10-26 DIAGNOSIS — R09.81 SINUS CONGESTION: ICD-10-CM

## 2022-10-26 DIAGNOSIS — Z09 HOSPITAL DISCHARGE FOLLOW-UP: ICD-10-CM

## 2022-10-26 LAB
BACTERIA BLD CULT: NORMAL
BACTERIA BLD CULT: NORMAL
SIGNIFICANT IND 70042: NORMAL
SIGNIFICANT IND 70042: NORMAL
SITE SITE: NORMAL
SITE SITE: NORMAL
SOURCE SOURCE: NORMAL
SOURCE SOURCE: NORMAL

## 2022-10-26 PROCEDURE — 99214 OFFICE O/P EST MOD 30 MIN: CPT | Performed by: NURSE PRACTITIONER

## 2022-10-26 RX ORDER — OMEPRAZOLE 20 MG/1
20 CAPSULE, DELAYED RELEASE ORAL DAILY
Qty: 30 CAPSULE | Refills: 1 | Status: SHIPPED | OUTPATIENT
Start: 2022-10-26 | End: 2022-11-22

## 2022-10-26 ASSESSMENT — FIBROSIS 4 INDEX: FIB4 SCORE: 1.46

## 2022-10-26 NOTE — PROGRESS NOTES
Chief Complaint   Patient presents with    Follow-Up     ED       HISTORY OF PRESENT ILLNESS: Patient is a 72 y.o. male, established patient who presents today to discuss medical problems as listed below:    Health Maintenance:  COMPLETED     Hospital discharge follow-up  Patient is here to follow-up on his hospital admission on 10/21/2022.  Patient woke up screaming, was found by wife to be confused and altered and had a syncopal event.  He was brought to the [facility] by Manav, he was found to be hypoxic and was placed on 4 L of oxygen.  CT of the head as well as an MRI were negative.  He had no focal weakness, no slurred speech.  He was followed on telemetry and EEG was in place, without events.  He was discharged in stable condition.  He was negative for COVID and flu.  He was found to be D satting down to 70s nocturnally due to sleep apnea.  Today he is satting at 96% on room air at rest.  He was discharged on oxygen nocturnally, 2 L.  According to wife feeling a little better in terms of orientation and cognitive function.  He has no pulse oximeter.  He has an appointment with sleep medicine on 11/8/2022, Dr. Hendrickson.    Gastroesophageal reflux disease without esophagitis  Chronic intermittent problem, epigastric tenderness.  1 cup of coffee daily.  Denies nausea, blood in stool, no vomiting.  Has tried Pepto-Bismol without improvement.    Benign prostatic hyperplasia with urinary frequency  Chronic problem.  Continues to have BPH symptoms including nocturnal frequency, postvoid dribbling.  Denies any fevers, flank pain or back pain.  He is on Flomax 0.4 mg for years.  Symptoms increased.  His PSA was 3.08 from 10 months ago, a year prior to that was 2.66.    Patient Active Problem List    Diagnosis Date Noted    Hospital discharge follow-up 10/26/2022    Sinus congestion 10/26/2022    Gastroesophageal reflux disease without esophagitis 10/26/2022    Benign prostatic hyperplasia with urinary  frequency 10/26/2022    Acute metabolic encephalopathy 10/21/2022    Seizure-like activity (HCC) 10/21/2022    Aortic calcification (HCC) 10/20/2022    Syncopal episodes 07/01/2022    Callus of heel 11/19/2021    Snoring 02/12/2021    Mitral valve insufficiency 01/08/2021    Elevated BP without diagnosis of hypertension 12/09/2020    Cardiac murmur 12/09/2020    Elevated hemoglobin A1c 11/13/2019    Seasonal allergic rhinitis 11/13/2019    BPH associated with nocturia 10/24/2019    Sacral back pain 10/24/2019    Hyperlipidemia 10/24/2019    HTN (hypertension) 10/24/2019    Vitamin D deficiency 10/24/2019        Allergies: Patient has no known allergies.    Current Outpatient Medications   Medication Sig Dispense Refill    omeprazole (PRILOSEC) 20 MG delayed-release capsule Take 1 Capsule by mouth every day. 30 Capsule 1    amLODIPine (NORVASC) 10 MG Tab TAKE 1 TABLET BY MOUTH EVERY DAY 90 Tablet 0    lisinopril (PRINIVIL) 40 MG tablet Take 1 Tablet by mouth every day. 90 Tablet 3    atorvastatin (LIPITOR) 40 MG Tab Take 1 Tablet by mouth at bedtime. 90 Tablet 3    tamsulosin (FLOMAX) 0.4 MG capsule Take 1 Capsule by mouth every day. 90 Capsule 3    Multiple Vitamins-Minerals (DAILY MULTI VITAMIN/MINERALS PO) Take 1 Tablet by mouth every day.      VITAMIN D PO Take 1 Tablet by mouth every day.       No current facility-administered medications for this visit.       Social History     Tobacco Use    Smoking status: Former     Types: Cigarettes     Start date: 1/8/1970    Smokeless tobacco: Never    Tobacco comments:     0.5 x 5 yrs, stopped at age 26   Vaping Use    Vaping Use: Never used   Substance Use Topics    Alcohol use: Not Currently    Drug use: Never     Social History     Social History Narrative    Not on file       Family History   Problem Relation Age of Onset    Cancer Mother 40        br ca    Heart Disease Mother     Cancer Father     Cancer Brother 61        brain ca       Allergies, past medical  "history, past surgical history, family history, social history reviewed and updated.    Review of Systems:     - Constitutional: Negative for fever, chills, unexpected weight change, and fatigue/generalized weakness.     - HEENT: Negative for headaches, vision changes, hearing changes, ear pain, ear discharge, rhinorrhea, sinus congestion, sore throat, and neck pain.      - Respiratory: Negative for cough, sputum production, chest congestion, dyspnea, wheezing, and crackles.      - Cardiovascular: Negative for chest pain, palpitations, orthopnea, and bilateral lower extremity edema.      - Genitourinary: Urinary hesitancy.  Negative for dysuria, polyuria, hematuria, pyuria, urinary urgency, and urinary incontinence.     - Neurological: Negative for dizziness, tingling, tremors, focal sensory deficit, focal weakness and headaches.     - Endo/Heme/Allergies: Does not bruise/bleed easily.     - Psychiatric/Behavioral: Negative for depression, suicidal/homicidal ideation and memory loss.      All other systems reviewed and are negative    Exam:    BP (P) 122/64 (BP Location: Left arm, Patient Position: Sitting, BP Cuff Size: Adult)   Pulse (P) 71   Temp (P) 37.2 °C (99 °F) (Temporal)   Resp 16   Ht 1.727 m (5' 8\")   Wt 94.5 kg (208 lb 6.4 oz)   SpO2 (P) 96%   BMI 31.69 kg/m²  Body mass index is 31.69 kg/m².    Physical Exam:  Constitutional: Well-developed and well-nourished. Not diaphoretic. No distress.   Cardiovascular: Regular rate and rhythm, S1 and S2 without murmur, rubs, or gallops.    Chest: Effort normal. Clear to auscultation throughout. No adventitious sounds. : Negative for dysuria, polyuria, hematuria, pyuria, urinary urgency, and urinary incontinence.  Extremities: No cyanosis, clubbing, erythema, nor edema. Distal pulses intact and symmetric.   Musculoskeletal: All major joints AROM full in all directions without pain.  Neurological: Alert and oriented x 3. DTRs 2+/3 and symmetric. No cranial " nerve deficit. 5/5 myotomes. Sensation intact. Negative Rhomberg.  Psychiatric:  Behavior, mood, and affect are appropriate.  MA/nursing note and vitals reviewed.    LABS: 10/21/22  results reviewed and discussed with the patient, questions answered.    Assessment/Plan:  1. Hospital discharge follow-up  Hospital records reviewed and discussed with patient.  All questions answered at this time.    2. Sinus congestion  Would appreciate ENT evaluation to rule out sinus polyps etc.  - Referral to ENT    3. Iron deficiency anemia, unspecified iron deficiency anemia type  We will repeat lab work in 2 months.  - CBC WITHOUT DIFFERENTIAL; Future  - IRON/TOTAL IRON BIND; Future  - FERRITIN; Future    4. Gastroesophageal reflux disease without esophagitis  Controlled stable.  Discussed etiology, advised to avoid caffeine, alcohol and acidic spicy foods.  Avoid heavy meals prior to bed.  Take Prilosec as needed when anticipating going out having a spicy meal.  - omeprazole (PRILOSEC) 20 MG delayed-release capsule; Take 1 Capsule by mouth every day.  Dispense: 30 Capsule; Refill: 1    5. Benign prostatic hyperplasia with urinary frequency  Uncontrolled, stable.  Continue Flomax.  We will follow-up next visit.  - PSA TOTAL + %FREE; Future    6. Frequent nocturnal awakening    7. Urine frequency  - PSA TOTAL + %FREE; Future       Discussed with patient possible alternative diagnoses, patient is to take all medications as prescribed.      If symptoms persist FU w/PCP, if symptoms worsen go to emergency room.      If experiencing any side effects from prescribed medications report to the office immediately or go to emergency room.     Reviewed indication, dosage, usage and potential adverse effects of prescribed medications.      Reviewed risks and benefits of treatment plan. Patient verbalizes understanding of all instruction and verbally agrees to plan.     Discussed plan with the patient, and patient agrees to the above.      I  personally reviewed prior external notes and test results pertinent to today's visit.      No follow-ups on file. 2 mos

## 2022-11-07 ENCOUNTER — HOSPITAL ENCOUNTER (OUTPATIENT)
Dept: LAB | Facility: MEDICAL CENTER | Age: 72
End: 2022-11-07
Attending: NURSE PRACTITIONER
Payer: MEDICARE

## 2022-11-07 DIAGNOSIS — R35.0 BENIGN PROSTATIC HYPERPLASIA WITH URINARY FREQUENCY: ICD-10-CM

## 2022-11-07 DIAGNOSIS — D50.9 IRON DEFICIENCY ANEMIA, UNSPECIFIED IRON DEFICIENCY ANEMIA TYPE: ICD-10-CM

## 2022-11-07 DIAGNOSIS — N40.1 BENIGN PROSTATIC HYPERPLASIA WITH URINARY FREQUENCY: ICD-10-CM

## 2022-11-07 DIAGNOSIS — R35.0 URINE FREQUENCY: ICD-10-CM

## 2022-11-07 LAB
ERYTHROCYTE [DISTWIDTH] IN BLOOD BY AUTOMATED COUNT: 41.2 FL (ref 35.9–50)
FERRITIN SERPL-MCNC: 287 NG/ML (ref 22–322)
HCT VFR BLD AUTO: 43.8 % (ref 42–52)
HGB BLD-MCNC: 14.9 G/DL (ref 14–18)
IRON SATN MFR SERPL: 49 % (ref 15–55)
IRON SERPL-MCNC: 130 UG/DL (ref 50–180)
MCH RBC QN AUTO: 30.6 PG (ref 27–33)
MCHC RBC AUTO-ENTMCNC: 34 G/DL (ref 33.7–35.3)
MCV RBC AUTO: 89.9 FL (ref 81.4–97.8)
PLATELET # BLD AUTO: 244 K/UL (ref 164–446)
PMV BLD AUTO: 11.1 FL (ref 9–12.9)
RBC # BLD AUTO: 4.87 M/UL (ref 4.7–6.1)
TIBC SERPL-MCNC: 265 UG/DL (ref 250–450)
UIBC SERPL-MCNC: 135 UG/DL (ref 110–370)
WBC # BLD AUTO: 5.3 K/UL (ref 4.8–10.8)

## 2022-11-07 PROCEDURE — 84154 ASSAY OF PSA FREE: CPT

## 2022-11-07 PROCEDURE — 85027 COMPLETE CBC AUTOMATED: CPT

## 2022-11-07 PROCEDURE — 84153 ASSAY OF PSA TOTAL: CPT

## 2022-11-07 PROCEDURE — 36415 COLL VENOUS BLD VENIPUNCTURE: CPT

## 2022-11-07 PROCEDURE — 83540 ASSAY OF IRON: CPT

## 2022-11-07 PROCEDURE — 83550 IRON BINDING TEST: CPT

## 2022-11-07 PROCEDURE — 82728 ASSAY OF FERRITIN: CPT

## 2022-11-08 ENCOUNTER — OFFICE VISIT (OUTPATIENT)
Dept: SLEEP MEDICINE | Facility: MEDICAL CENTER | Age: 72
End: 2022-11-08
Payer: MEDICARE

## 2022-11-08 VITALS
DIASTOLIC BLOOD PRESSURE: 74 MMHG | HEART RATE: 67 BPM | OXYGEN SATURATION: 97 % | SYSTOLIC BLOOD PRESSURE: 124 MMHG | BODY MASS INDEX: 30.36 KG/M2 | HEIGHT: 69 IN | WEIGHT: 205 LBS | RESPIRATION RATE: 16 BRPM

## 2022-11-08 DIAGNOSIS — G47.34 NOCTURNAL HYPOXIA: ICD-10-CM

## 2022-11-08 DIAGNOSIS — G47.30 SLEEP DISORDER BREATHING: ICD-10-CM

## 2022-11-08 DIAGNOSIS — R06.83 SNORING: ICD-10-CM

## 2022-11-08 DIAGNOSIS — G47.50 PARASOMNIA, UNSPECIFIED TYPE: ICD-10-CM

## 2022-11-08 PROCEDURE — 99204 OFFICE O/P NEW MOD 45 MIN: CPT | Performed by: STUDENT IN AN ORGANIZED HEALTH CARE EDUCATION/TRAINING PROGRAM

## 2022-11-08 ASSESSMENT — FIBROSIS 4 INDEX: FIB4 SCORE: 1.14

## 2022-11-08 ASSESSMENT — ENCOUNTER SYMPTOMS: SLEEP DISTURBANCE: 0

## 2022-11-08 NOTE — PROGRESS NOTES
[facility] Consult Note     Date: 11/8/2022 / Time: 10:07 AM      Thank you for requesting a sleep medicine consultation on Ravindra Raines at the sleep center. Presents today with the chief complaints of snoring, recent event during sleep which led to hospitalization. He is referred by B[facility]  [address] [id number] for evaluation and treatment of sleep disorder breathing .     HISTORY OF PRESENT ILLNESS:     Ravindra Raines is a 72 y.o. male with hypertension, hyperlipidemia, GERD, BPH, nocturnal hypoxia on 2 L/min supplemental oxygen at night and snoring.  Presents to Sleep Clinic for evaluation of snoring and recent episode during sleep.     He was initially referred to sleep medicine from his PCP regarding snoring in his sleep.  However at the end of October he had episode in his sleep which led to hospitalization.  At around 1 1:30 in the morning he screamed in sleep and was unable to be awoken.  His wife would shake him and he was unable to wake up.  This led to her calling EMS who arrived and transported him to the hospital.  He states that when EMS arrived at his house he did have a low oxygen level of around 70%.  He was unresponsive.  States his hospital work-up was negative.  He does not recall anything that happened at his home but only remembers waking in the hospital.  He states he was confused initially and even called his wife Preeti which is not her name.  He is unsure of what led to this event.  He has not had an event like this before.  Following hospitalization he was placed on 2 L supplemental oxygen at night.    He denies any history of seizures.    His wife has told him that he will occasionally snore.  He does wake up with a dry mouth and does grind his teeth.  He generally does find his sleep refreshing.  His wife has reported to him that he does move his leg in his sleep.  He does not have trouble falling asleep or  "staying asleep.    As per supplemental questionnaire to be scanned or imported into chart:    [place] Sleepiness Score: 3    Sleep Schedule  Bedtime: Weekday & Weekend 1030-11pm  Wake time: Weekday & Weekend 6-630am  Sleep-onset latency: 20-30 min   Awakenings from sleep: 2-3 restroom   Difficulty falling back asleep: no  Bedroom partner: yes  Naps: No , try's to avoid     DAYTIME SYMPTOMS:   Excessive daytime sleepiness: No   Daytime fatigue: No   Difficulty concentrating: No   Memory problems: No   Irritability:No   Work/school performance issues: No   Sleepiness with driving: No   Caffeine/stimulant use: Yes  Alcohol use:No     SLEEP RELATED SYMPTOMS  Snoring: Yes  Witnessed apnea or gasping/choking: No   Dry mouth or mouth breathing: Yes  Sweating: No   Teeth grinding/biting: Yes  Morning headaches: No   Refreshed Upon Awakening: Yes     SLEEP RELATED BEHAVIORS:  Parasomnias (walking, talking, eating, violence): No   Leg kicking: Yes  Restless legs - \"urge to move\": No   Nightmares: No  Recurrent: No   Dream enactment: No      NARCOLEPSY:  Cataplexy: No   Sleep paralysis: No   Sleep attacks: No   Hypnagogic/hypnopompic hallucinations: No     MEDICAL HISTORY  Past Medical History:   Diagnosis Date    Apnea, sleep     Arrhythmia 09/02/2021    BPH associated with nocturia     Chickenpox     Frequent urination     Persian measles     Snoring     Wears glasses         SURGICAL HISTORY  Past Surgical History:   Procedure Laterality Date    APPENDECTOMY  1990    TONSILLECTOMY          FAMILY HISTORY  Family History   Problem Relation Age of Onset    Cancer Mother         br ca    Heart Disease Mother     Cancer Father     Cancer Brother         brain ca       SOCIAL HISTORY  Social History     Socioeconomic History    Marital status:    Occupational History    Occupation: retired   Tobacco Use    Smoking status: Former     Types: Cigarettes     Start date: 1/8/1970     Quit date: 1/8/1979     Years since " "quittin.8    Smokeless tobacco: Never    Tobacco comments:     0.5 x 5 yrs, stopped at age 26   Vaping Use    Vaping Use: Never used   Substance and Sexual Activity    Alcohol use: Not Currently     Comment: occ    Drug use: Never    Sexual activity: Yes     Partners: Female     Comment:         Occupation: Retired    CURRENT MEDICATIONS  Current Outpatient Medications   Medication Sig Dispense Refill    amLODIPine (NORVASC) 10 MG Tab TAKE 1 TABLET BY MOUTH EVERY DAY 90 Tablet 0    lisinopril (PRINIVIL) 40 MG tablet Take 1 Tablet by mouth every day. 90 Tablet 3    atorvastatin (LIPITOR) 40 MG Tab Take 1 Tablet by mouth at bedtime. 90 Tablet 3    tamsulosin (FLOMAX) 0.4 MG capsule Take 1 Capsule by mouth every day. 90 Capsule 3    Multiple Vitamins-Minerals (DAILY MULTI VITAMIN/MINERALS PO) Take 1 Tablet by mouth every day.      VITAMIN D PO Take 1 Tablet by mouth every day.      omeprazole (PRILOSEC) 20 MG delayed-release capsule Take 1 Capsule by mouth every day. (Patient not taking: Reported on 2022) 30 Capsule 1     No current facility-administered medications for this visit.       REVIEW OF SYSTEMS  Constitutional: Denies fevers, Denies weight changes  Ears/Nose/Throat/Mouth: Denies nasal congestion or sore throat   Cardiovascular: Denies chest pain  Respiratory: Denies shortness of breath, Denies cough  Gastrointestinal/Hepatic: Denies nausea, vomiting  Sleep: see HPI    Physical Examination:  Vitals/ General Appearance:   Weight/BMI: Body mass index is 30.72 kg/m².  Resp 16   Ht 1.74 m (5' 8.5\")   Wt 93 kg (205 lb)   Vitals:    22 1005   Resp: 16   Weight: 93 kg (205 lb)   Height: 1.74 m (5' 8.5\")       Pt. is alert and oriented to time, place and person. Cooperative and in no apparent distress.     Constitutional: Alert, no distress, well-groomed.  Skin: No rashes in visible areas.  Eye: Round. Conjunctiva clear, lids normal. No icterus.   ENT EXAM  Nasal alae/valves collapsible: No "   Nasal septum deviation: Yes  Nasal turbinate hypertrophy: Left: Grade 1   Right: Grade 1  Hard palate narrow: No   Hard palate high: No   Soft palate/uvula (Mallampati score): 3  Tongue Scalloping: Yes  Retrognathia: No   Micrognathia: No   Cardiovascular:no murmus/gallops/rubs, normal S1 and S2 heart sounds, regular rate and rhythm  Pulmonary:Clear to auscultation, No wheezes, No crackles.  Neurologic:Awake, alert and oriented x 3, Normal age appropriate gait, No involuntary motions.  Extremities: No clubbing, cyanosis, or edema       ASSESSMENT AND PLAN   Ravindra Raines is a 72 y.o. male with hypertension, hyperlipidemia, GERD, BPH, nocturnal hypoxia on 2 L/min supplemental oxygen at night and snoring.  Presents to Sleep Clinic for evaluation of snoring and recent episode during sleep.    1. Ravindra Raines  has symptoms of Obstructive Sleep Apnea (SHIRA). Ravindra Raines has symptoms of snoring, teeth grinding and dry mouth.   Pt has risk factors for SHIRA include obesity, age, and crowded oropharynx.     The pathophysiology of SHIRA and the increased risk of cardiovascular morbidity from untreated SHIRA is discussed in detail with the patient. He  also has HTN, GERD, BPH, nocturnal hypoxia which can be worsened by SHIRA.      We have discussed diagnostic options including in-laboratory, attended polysomnography and home sleep testing. We have also discussed treatment options including airway pressurization, reconstructive otolaryngologic surgery, dental appliances and weight management.     Subsequently,treatment options will be discussed based on the diagnostic study. Meanwhile, He is urged to avoid supine sleep, weight gain and alcoholic beverages since all of these can worsen SHIRA. He is cautioned against drowsy driving. If He feels sleepy while driving, advised must pull over for a break/nap, rather than persist on the road, in the interest of Pt's own safety and that of others on the road.  Advised patient  to continue supplemental oxygen for the time being    Plan  -  He  will be scheduled for an overnight PSG to assess sleep related breathing disorder.  - Follow up 1-2 weeks after sleep study to discuss results and treatment options moving forward   -Advised to reach out via MyChart or by phone with any questions or concerns.     2.  Potential parasomnia unspecified  Patient had a recent unexplained episode of screaming and unresponsiveness.  Typical parasomnia unlikely however cannot be fully excluded.  Other potential causes include seizure or CVA.  Event is atypical.  There does not appear to be any REM or non-REM parasomnia behavior specific to this episode however screaming may represent sleep talking.  Due to the nature of this event would recommend undergoing a in lab sleep study with extra EEG leads.    Plan  -Await sleep study results  -Continue to monitor for recurrence of symptoms.    Regarding treatment of other past medical problems and general health maintenance,  Pt is urged to follow up with PCP.      Please note portions of this record was created using voice recognition software. I have made every reasonable attempt to correct obvious errors, but I expect that there are errors of grammar and possibly content I did not discover before finalizing the note.

## 2022-11-08 NOTE — PATIENT INSTRUCTIONS
-Start Flonase one spray in each nostril in the evening. There are store brand as well Fluticasone.

## 2022-11-09 LAB
PSA FREE MFR SERPL: 20 %
PSA FREE SERPL-MCNC: 0.6 NG/ML
PSA SERPL-MCNC: 3 NG/ML (ref 0–4)

## 2022-11-10 ENCOUNTER — PATIENT MESSAGE (OUTPATIENT)
Dept: HEALTH INFORMATION MANAGEMENT | Facility: OTHER | Age: 72
End: 2022-11-10

## 2022-11-15 ENCOUNTER — TELEPHONE (OUTPATIENT)
Dept: MEDICAL GROUP | Facility: PHYSICIAN GROUP | Age: 72
End: 2022-11-15
Payer: MEDICARE

## 2022-11-15 NOTE — TELEPHONE ENCOUNTER
Patient called in regards ENT referral, he states the first available appointment its at the end of December and would like to know if PCP can send the referral as urgent or STAT so he get in sooner.     Please advise.

## 2022-11-16 DIAGNOSIS — R09.81 SINUS CONGESTION: ICD-10-CM

## 2022-11-21 ENCOUNTER — SLEEP STUDY (OUTPATIENT)
Dept: SLEEP MEDICINE | Facility: MEDICAL CENTER | Age: 72
End: 2022-11-21
Attending: STUDENT IN AN ORGANIZED HEALTH CARE EDUCATION/TRAINING PROGRAM
Payer: MEDICARE

## 2022-11-21 DIAGNOSIS — G47.34 NOCTURNAL HYPOXIA: ICD-10-CM

## 2022-11-21 DIAGNOSIS — G47.50 PARASOMNIA, UNSPECIFIED TYPE: ICD-10-CM

## 2022-11-21 DIAGNOSIS — R06.83 SNORING: ICD-10-CM

## 2022-11-21 DIAGNOSIS — G47.30 SLEEP DISORDER BREATHING: ICD-10-CM

## 2022-11-21 PROCEDURE — 95810 POLYSOM 6/> YRS 4/> PARAM: CPT | Performed by: STUDENT IN AN ORGANIZED HEALTH CARE EDUCATION/TRAINING PROGRAM

## 2022-11-22 DIAGNOSIS — K21.9 GASTROESOPHAGEAL REFLUX DISEASE WITHOUT ESOPHAGITIS: ICD-10-CM

## 2022-11-22 RX ORDER — OMEPRAZOLE 20 MG/1
20 CAPSULE, DELAYED RELEASE ORAL DAILY
Qty: 30 CAPSULE | Refills: 1 | Status: SHIPPED | OUTPATIENT
Start: 2022-11-22 | End: 2022-12-07

## 2022-11-30 NOTE — PROCEDURES
MONTAGE: Standard  STUDY TYPE: Diagnostic    RECORDING TECHNIQUE:   After the scalp was prepared, gold plated electrodes were applied to the scalp according to the International 10-20 System. EEG (electroencephalogram) was continuously monitored from the O1-M2, O2-M1, C3-M2, C4-M1, F3-M2, and F4-M1. EOGs (electrooculograms) were monitored by electrodes placed at the left and right outer canthi. Chin EMG (electromyogram) was monitored by electrodes placed on the mentalis and sub-mentalis muscles. Nasal and oral airflow were monitored using a triple port thermocouple as well as oronasal pressure transducer. Respiratory effort was measured by inductive plethysmography technology employing abdominal and thoracic belts. Blood oxygen saturation and pulse were monitored by pulse oximetry. Heart rhythm was monitored by surface electrocardiogram. Leg EMG was studied using surface electrodes placed on left and right anterior tibialis. A microphone was used to monitor tracheal sounds and snoring. Body position was monitored and documented by technician observation.   SCORING CRITERIA:   A modification of the AASM manual for scoring of sleep and associated events was used. Obstructive apneas were scored by cessation of airflow for at least 10 seconds with continuing respiratory effort. Central apneas were scored by cessation of airflow for at least 10 seconds with no respiratory effort. Hypopneas were scored by a 30% or more reduction in airflow for at least 10 seconds accompanied by arterial oxygen desaturation of 3% or an arousal. For CMS (Medicare) patients, per AASM rule 1B, hypopneas are scored by 30% with mild reduction in airflow for at least 10 seconds accompanied by arterial saturation decreased at 4%.    Study start time was 10:36:12 PM. Diagnostic recording time was 7h 41.0m with a total sleep time of 4h 37.0m resulting in a sleep efficiency of 60.09%%. Sleep latency from the start of the study was 07 minutes and the  latency from sleep to REM was 331 minutes. In total,147 arousals were scored for an arousal index of 31.8.  Respiratory:  There were a total of 198 apneas consisting of 78 obstructive apneas, 0 mixed apneas, and 120 central apneas. A total of 67 hypopneas were scored. The apnea index was 42.89 per hour and the hypopnea index was 14.51 per hour resulting in an overall AHI of 57.40. AHI during rem was 18.1 and AHI while supine was 82.99.  Central apneas accounted for 45.3% of respiratory events  Oximetry:  There was a mean oxygen saturation of 92.0%. The minimum oxygen saturation in NREM was 79.0 % and in REM was 85.0. The patient spent 32.7 minutes of TST with SaO2 <88%.  Cardiac:  The highest heart rate seen while awake was 100 BPM while the highest heart rate during sleep was 67 BPM with an average sleeping heart rate of 54 BPM.  Limb Movements:  There were a total of 410 PLMs during sleep which resulted in a PLMS index of 88.8. Of these, 48 were associated with arousals which resulted in a PLMS arousal index of 10.4.    Impression:  1.  Severe obstructive sleep apnea with overall AHI 57.4  2.  Central apneas accounted for 45% of respiratory events  3.  Nocturnal hypoxia likely secondary to untreated sleep apnea minimum oxygen saturation 79% time at or below 88% saturation 32.7 minutes  4.  PLM's noted  5.  Sleep was fragmented with a poor sleep efficiency of 60%      Recommendations:  I recommend that the patient should return for a CPAP/BiPAP/ASV titration due to the severity of sleep apnea and elevation of central apneic events.    In some cases alternative treatment options may be proven effective in resolving sleep apnea. These options include upper airway surgery, the use of a dental orthotic, weight loss, or positional therapy. Clinical correlation is required. In general patients with sleep apnea are advised to avoid alcohol, sedatives and not to operate a motor vehicle while drowsy.  Untreated sleep apnea  increases the risk for cardiovascular and neurovascular disease.

## 2022-12-03 SDOH — HEALTH STABILITY: PHYSICAL HEALTH: ON AVERAGE, HOW MANY DAYS PER WEEK DO YOU ENGAGE IN MODERATE TO STRENUOUS EXERCISE (LIKE A BRISK WALK)?: 2 DAYS

## 2022-12-03 SDOH — ECONOMIC STABILITY: HOUSING INSECURITY
IN THE LAST 12 MONTHS, WAS THERE A TIME WHEN YOU DID NOT HAVE A STEADY PLACE TO SLEEP OR SLEPT IN A SHELTER (INCLUDING NOW)?: NO

## 2022-12-03 SDOH — ECONOMIC STABILITY: INCOME INSECURITY: IN THE LAST 12 MONTHS, WAS THERE A TIME WHEN YOU WERE NOT ABLE TO PAY THE MORTGAGE OR RENT ON TIME?: PATIENT REFUSED

## 2022-12-03 SDOH — ECONOMIC STABILITY: FOOD INSECURITY: WITHIN THE PAST 12 MONTHS, THE FOOD YOU BOUGHT JUST DIDN'T LAST AND YOU DIDN'T HAVE MONEY TO GET MORE.: NEVER TRUE

## 2022-12-03 SDOH — ECONOMIC STABILITY: HOUSING INSECURITY: IN THE LAST 12 MONTHS, HOW MANY PLACES HAVE YOU LIVED?: 1

## 2022-12-03 SDOH — ECONOMIC STABILITY: FOOD INSECURITY: WITHIN THE PAST 12 MONTHS, YOU WORRIED THAT YOUR FOOD WOULD RUN OUT BEFORE YOU GOT MONEY TO BUY MORE.: NEVER TRUE

## 2022-12-03 SDOH — ECONOMIC STABILITY: TRANSPORTATION INSECURITY
IN THE PAST 12 MONTHS, HAS LACK OF RELIABLE TRANSPORTATION KEPT YOU FROM MEDICAL APPOINTMENTS, MEETINGS, WORK OR FROM GETTING THINGS NEEDED FOR DAILY LIVING?: NO

## 2022-12-03 SDOH — HEALTH STABILITY: PHYSICAL HEALTH: ON AVERAGE, HOW MANY MINUTES DO YOU ENGAGE IN EXERCISE AT THIS LEVEL?: 30 MIN

## 2022-12-03 SDOH — ECONOMIC STABILITY: TRANSPORTATION INSECURITY
IN THE PAST 12 MONTHS, HAS THE LACK OF TRANSPORTATION KEPT YOU FROM MEDICAL APPOINTMENTS OR FROM GETTING MEDICATIONS?: NO

## 2022-12-03 SDOH — ECONOMIC STABILITY: HOUSING INSECURITY
IN THE LAST 12 MONTHS, WAS THERE A TIME WHEN YOU DID NOT HAVE A STEADY PLACE TO SLEEP OR SLEPT IN A SHELTER (INCLUDING NOW)?: PATIENT REFUSED

## 2022-12-03 SDOH — HEALTH STABILITY: MENTAL HEALTH
STRESS IS WHEN SOMEONE FEELS TENSE, NERVOUS, ANXIOUS, OR CAN'T SLEEP AT NIGHT BECAUSE THEIR MIND IS TROUBLED. HOW STRESSED ARE YOU?: ONLY A LITTLE

## 2022-12-03 SDOH — ECONOMIC STABILITY: INCOME INSECURITY: HOW HARD IS IT FOR YOU TO PAY FOR THE VERY BASICS LIKE FOOD, HOUSING, MEDICAL CARE, AND HEATING?: NOT HARD AT ALL

## 2022-12-03 SDOH — ECONOMIC STABILITY: TRANSPORTATION INSECURITY
IN THE PAST 12 MONTHS, HAS LACK OF TRANSPORTATION KEPT YOU FROM MEETINGS, WORK, OR FROM GETTING THINGS NEEDED FOR DAILY LIVING?: NO

## 2022-12-03 ASSESSMENT — SOCIAL DETERMINANTS OF HEALTH (SDOH)
HOW OFTEN DO YOU GET TOGETHER WITH FRIENDS OR RELATIVES?: ONCE A WEEK
DO YOU BELONG TO ANY CLUBS OR ORGANIZATIONS SUCH AS CHURCH GROUPS UNIONS, FRATERNAL OR ATHLETIC GROUPS, OR SCHOOL GROUPS?: YES
IN A TYPICAL WEEK, HOW MANY TIMES DO YOU TALK ON THE PHONE WITH FAMILY, FRIENDS, OR NEIGHBORS?: TWICE A WEEK
HOW OFTEN DO YOU GET TOGETHER WITH FRIENDS OR RELATIVES?: ONCE A WEEK
HOW OFTEN DO YOU ATTENT MEETINGS OF THE CLUB OR ORGANIZATION YOU BELONG TO?: 1 TO 4 TIMES PER YEAR
HOW OFTEN DO YOU ATTEND CHURCH OR RELIGIOUS SERVICES?: MORE THAN 4 TIMES PER YEAR
HOW OFTEN DO YOU ATTENT MEETINGS OF THE CLUB OR ORGANIZATION YOU BELONG TO?: 1 TO 4 TIMES PER YEAR
HOW HARD IS IT FOR YOU TO PAY FOR THE VERY BASICS LIKE FOOD, HOUSING, MEDICAL CARE, AND HEATING?: NOT HARD AT ALL
IN A TYPICAL WEEK, HOW MANY TIMES DO YOU TALK ON THE PHONE WITH FAMILY, FRIENDS, OR NEIGHBORS?: TWICE A WEEK
HOW OFTEN DO YOU HAVE A DRINK CONTAINING ALCOHOL: MONTHLY OR LESS
HOW OFTEN DO YOU HAVE SIX OR MORE DRINKS ON ONE OCCASION: NEVER
HOW OFTEN DO YOU ATTEND CHURCH OR RELIGIOUS SERVICES?: MORE THAN 4 TIMES PER YEAR
DO YOU BELONG TO ANY CLUBS OR ORGANIZATIONS SUCH AS CHURCH GROUPS UNIONS, FRATERNAL OR ATHLETIC GROUPS, OR SCHOOL GROUPS?: YES
WITHIN THE PAST 12 MONTHS, YOU WORRIED THAT YOUR FOOD WOULD RUN OUT BEFORE YOU GOT THE MONEY TO BUY MORE: NEVER TRUE
HOW MANY DRINKS CONTAINING ALCOHOL DO YOU HAVE ON A TYPICAL DAY WHEN YOU ARE DRINKING: PATIENT DOES NOT DRINK

## 2022-12-03 ASSESSMENT — LIFESTYLE VARIABLES
SKIP TO QUESTIONS 9-10: 1
HOW OFTEN DO YOU HAVE A DRINK CONTAINING ALCOHOL: MONTHLY OR LESS
AUDIT-C TOTAL SCORE: 1
HOW MANY STANDARD DRINKS CONTAINING ALCOHOL DO YOU HAVE ON A TYPICAL DAY: PATIENT DOES NOT DRINK
HOW OFTEN DO YOU HAVE SIX OR MORE DRINKS ON ONE OCCASION: NEVER

## 2022-12-06 ENCOUNTER — OFFICE VISIT (OUTPATIENT)
Dept: MEDICAL GROUP | Facility: MEDICAL CENTER | Age: 72
End: 2022-12-06
Payer: MEDICARE

## 2022-12-06 VITALS
DIASTOLIC BLOOD PRESSURE: 62 MMHG | TEMPERATURE: 97.3 F | WEIGHT: 209.44 LBS | HEART RATE: 57 BPM | HEIGHT: 68 IN | SYSTOLIC BLOOD PRESSURE: 132 MMHG | RESPIRATION RATE: 16 BRPM | OXYGEN SATURATION: 98 % | BODY MASS INDEX: 31.74 KG/M2

## 2022-12-06 DIAGNOSIS — K21.9 GASTROESOPHAGEAL REFLUX DISEASE WITHOUT ESOPHAGITIS: ICD-10-CM

## 2022-12-06 DIAGNOSIS — Z23 NEED FOR VACCINATION: ICD-10-CM

## 2022-12-06 DIAGNOSIS — Z00.00 MEDICARE ANNUAL WELLNESS VISIT, SUBSEQUENT: ICD-10-CM

## 2022-12-06 DIAGNOSIS — R56.9 SEIZURE-LIKE ACTIVITY (HCC): ICD-10-CM

## 2022-12-06 DIAGNOSIS — I70.0 AORTIC CALCIFICATION (HCC): ICD-10-CM

## 2022-12-06 PROCEDURE — G0439 PPPS, SUBSEQ VISIT: HCPCS | Performed by: NURSE PRACTITIONER

## 2022-12-06 ASSESSMENT — FIBROSIS 4 INDEX: FIB4 SCORE: 1.14

## 2022-12-06 ASSESSMENT — ACTIVITIES OF DAILY LIVING (ADL): BATHING_REQUIRES_ASSISTANCE: 0

## 2022-12-06 ASSESSMENT — ENCOUNTER SYMPTOMS: GENERAL WELL-BEING: GOOD

## 2022-12-06 ASSESSMENT — PATIENT HEALTH QUESTIONNAIRE - PHQ9: CLINICAL INTERPRETATION OF PHQ2 SCORE: 0

## 2022-12-06 NOTE — PROGRESS NOTES
Chief Complaint   Patient presents with    Annual Exam     HRA     HPI:  Ravindra is a 72 y.o. here for Medicare Annual Wellness Visit    Patient Active Problem List    Diagnosis Date Noted    Hospital discharge follow-up 10/26/2022    Sinus congestion 10/26/2022    Gastroesophageal reflux disease without esophagitis 10/26/2022    Benign prostatic hyperplasia with urinary frequency 10/26/2022    Acute metabolic encephalopathy 10/21/2022    Seizure-like activity (HCC) 10/21/2022    Aortic calcification (HCC) 10/20/2022    Syncopal episodes 07/01/2022    Callus of heel 11/19/2021    Snoring 02/12/2021    Mitral valve insufficiency 01/08/2021    Elevated BP without diagnosis of hypertension 12/09/2020    Cardiac murmur 12/09/2020    Elevated hemoglobin A1c 11/13/2019    Seasonal allergic rhinitis 11/13/2019    BPH associated with nocturia 10/24/2019    Sacral back pain 10/24/2019    Hyperlipidemia 10/24/2019    HTN (hypertension) 10/24/2019    Vitamin D deficiency 10/24/2019       Current Outpatient Medications   Medication Sig Dispense Refill    tamsulosin (FLOMAX) 0.4 MG capsule TAKE 1 CAPSULE BY MOUTH EVERY DAY 90 Capsule 3    omeprazole (PRILOSEC) 20 MG delayed-release capsule TAKE 1 CAPSULE BY MOUTH EVERY DAY 30 Capsule 1    amLODIPine (NORVASC) 10 MG Tab TAKE 1 TABLET BY MOUTH EVERY DAY 90 Tablet 0    lisinopril (PRINIVIL) 40 MG tablet Take 1 Tablet by mouth every day. 90 Tablet 3    atorvastatin (LIPITOR) 40 MG Tab Take 1 Tablet by mouth at bedtime. 90 Tablet 3    Multiple Vitamins-Minerals (DAILY MULTI VITAMIN/MINERALS PO) Take 1 Tablet by mouth every day.      VITAMIN D PO Take 1 Tablet by mouth every day.       No current facility-administered medications for this visit.        Patient is taking medications as noted in medication list.  Current supplements as per medication list.     Allergies: Patient has no known allergies.    Current social contact/activities: pt is , family in town, great support  systems in friends and family. Gnosticism goers. For physocal activity enjoys walking. Enjoys reading, news, financial.    Is patient current with immunizations? Yes.    He  reports that he quit smoking about 43 years ago. His smoking use included cigarettes. He started smoking about 52 years ago. He has never used smokeless tobacco. He reports that he does not currently use alcohol. He reports that he does not use drugs.  Counseling given: Not Answered  Tobacco comments: 0.5 x 5 yrs, stopped at age 26      ROS:    Gait: Uses no assistive device   Ostomy: No   Other tubes: No   Amputations: No   Chronic oxygen use No   Last eye exam 6 months ago   Wears hearing aids: No   : Denies any urinary leakage during the last 6 months    Screening:    Depression Screening  Little interest or pleasure in doing things?   0  Feeling down, depressed, or hopeless?  0  Trouble falling or staying asleep, or sleeping too much?   0  Feeling tired or having little energy?   0  Poor appetite or overeating?   0  Feeling bad about yourself - or that you are a failure or have let yourself or your family down?  0  Trouble concentrating on things, such as reading the newspaper or watching television?  0  Moving or speaking so slowly that other people could have noticed.  Or the opposite - being so fidgety or restless that you have been moving around a lot more than usual?   0  Thoughts that you would be better off dead, or of hurting yourself?   0  Patient Health Questionnaire Score:  0    If depressive symptoms identified deferred to follow up visit unless specifically addressed in assessment and plan.    Interpretation of PHQ-9 Total Score   Score Severity   1-4 No Depression   5-9 Mild Depression   10-14 Moderate Depression   15-19 Moderately Severe Depression   20-27 Severe Depression    Screening for Cognitive Impairment  Three Minute Recall (daughter, heaven, mountain)  3 /3    Draw clock face with all 12 numbers and set the hands to  show 10 past 11.     passed  If cognitive concerns identified, deferred for follow up unless specifically addressed in assessment and plan.    Fall Risk Assessment  Has the patient had two or more falls in the last year or any fall with injury in the last year?   no  If fall risk identified, deferred for follow up unless specifically addressed in assessment and plan.    Safety Assessment  Throw rugs on floor.   yes  Handrails on all stairs.   yes  Good lighting in all hallways.   yes  Difficulty hearing.   no  Patient counseled about all safety risks that were identified.    Functional Assessment ADLs  Are there any barriers preventing you from cooking for yourself or meeting nutritional needs?   .  no  Are there any barriers preventing you from driving safely or obtaining transportation?   .  no  Are there any barriers preventing you from using a telephone or calling for help?   .  no  Are there any barriers preventing you from shopping?   .  no  Are there any barriers preventing you from taking care of your own finances?   .  no  Are there any barriers preventing you from managing your medications?   .  no  Are there any barriers preventing you from showering, bathing or dressing yourself?   .  no  Are you currently engaging in any exercise or physical activity?   . Yes, walking    What is your perception of your health? good   .    Advance Care Planning  Do you have an Advance Directive, Living Will, Durable Power of , or POLST?  yes               Health Maintenance Summary            Overdue - Annual Wellness Visit (Every 366 Days) Overdue - never done      No completion history exists for this topic.              Ordered - COLORECTAL CANCER SCREENING (COLONOSCOPY - Every 10 Years) Ordered on 6/8/2022      No completion history exists for this topic.              Ordered - ABDOMINAL AORTIC ANEURYSM (AAA) SCREEN (Once) Ordered on 7/13/2022      No completion history exists for this topic.               Overdue - IMM ZOSTER VACCINES (2 of 2) Overdue since 2/3/2021      2020  Imm Admin: Zoster Vaccine Recombinant (RZV) (SHINGRIX)              Overdue - IMM PNEUMOCOCCAL VACCINE: 65+ Years (2 - PPSV23 if available, else PCV20) Overdue since 3/11/2021      2020  Imm Admin: Pneumococcal Conjugate Vaccine (Prevnar/PCV-13)              Overdue - COVID-19 Vaccine (3 - Booster for Moderna series) Overdue since 2021  Imm Admin: MODERNA SARS-COV-2 VACCINE (12+)    2021  Imm Admin: MODERNA SARS-COV-2 VACCINE (12+)              Postponed - IMM HEP B VACCINE (1 of 3 - 3-dose series) Postponed until 10/26/2023      No completion history exists for this topic.              IMM DTaP/Tdap/Td Vaccine (2 - Td or Tdap) Next due on 2020  Imm Admin: Tdap Vaccine              HEPATITIS C SCREENING  Completed      2019  Hep C Virus Antibody              IMM INFLUENZA (Series Information) Completed      2022  Imm Admin: Influenza Vaccine, Quadrivalent, Adjuvanted (Pf)    2022  Imm Admin: Influenza, Unspecified - HISTORICAL DATA    2021  Imm Admin: Influenza Vaccine Adult HD    10/20/2020  Imm Admin: Influenza Vaccine Adult HD    2019  Imm Admin: Influenza Vaccine Adult HD              IMM MENINGOCOCCAL ACWY VACCINE (Series Information) Aged Out      No completion history exists for this topic.                    Patient Care Team:  BILL Butterfield as PCP - General (Family Medicine)  Yovani Barreto, PT, DPT as Physical Therapist (Physical Therapy)    Social History     Tobacco Use    Smoking status: Former     Types: Cigarettes     Start date: 1970     Quit date: 1979     Years since quittin.9    Smokeless tobacco: Never    Tobacco comments:     0.5 x 5 yrs, stopped at age 26   Vaping Use    Vaping Use: Never used   Substance Use Topics    Alcohol use: Not Currently     Comment: occ    Drug use: Never     Family History   Problem  "Relation Age of Onset    Cancer Mother         br ca    Heart Disease Mother     Cancer Father     Cancer Brother         brain ca     He  has a past medical history of Apnea, sleep, Arrhythmia (09/02/2021), BPH associated with nocturia, Chickenpox, Frequent urination, Greek measles, Snoring, and Wears glasses.    He has no past medical history of Restless leg syndrome.   Past Surgical History:   Procedure Laterality Date    APPENDECTOMY  1990    TONSILLECTOMY         Exam:   /62 (BP Location: Left arm, Patient Position: Sitting, BP Cuff Size: Adult)   Pulse (!) 57   Temp 36.3 °C (97.3 °F)   Resp 16   Ht 1.727 m (5' 8\")   Wt 95 kg (209 lb 7 oz)   SpO2 98%  Body mass index is 31.84 kg/m².    Hearing excellent.    Dentition good  Alert, oriented in no acute distress  Eye contact is good, speech goal directed, affect calm      Assessment and Plan. The following treatment and monitoring plan is recommended:    There are no diagnoses linked to this encounter.     Services suggested: No services needed at this time  Health Care Screening recommendations as per orders if indicated.  Referrals offered: PT/OT/Nutrition counseling/Behavioral Health/Smoking cessation as per orders if indicated.    Discussion today about general wellness and lifestyle habits:    Prevent falls and reduce trip hazards; Cautioned about securing or removing rugs.  Have a working fire alarm and carbon monoxide detector;   Engage in regular physical activity and social activities.     Follow-up: No follow-ups on file.  "

## 2022-12-06 NOTE — ASSESSMENT & PLAN NOTE
Noted on ECHO in 2022.   Aortic Valve  Structurally normal aortic valve without significant stenosis. Mild   aortic insufficiency.    BP is well controlled, lipid panel WNL, on Lipitor.

## 2022-12-06 NOTE — ASSESSMENT & PLAN NOTE
A single episode in Fall, hois O2 was in 70s, was hospitalized. Sleep apnea study confirmed, schedule with pulmonary on 12/19/22.   For now utilizing nocturnal O2, 2L, saturating in the 90s.

## 2022-12-07 ENCOUNTER — OFFICE VISIT (OUTPATIENT)
Dept: MEDICAL GROUP | Facility: MEDICAL CENTER | Age: 72
End: 2022-12-07
Payer: MEDICARE

## 2022-12-07 VITALS
RESPIRATION RATE: 18 BRPM | BODY MASS INDEX: 32.87 KG/M2 | DIASTOLIC BLOOD PRESSURE: 62 MMHG | TEMPERATURE: 97.4 F | SYSTOLIC BLOOD PRESSURE: 130 MMHG | WEIGHT: 209.44 LBS | OXYGEN SATURATION: 98 % | HEART RATE: 54 BPM | HEIGHT: 67 IN

## 2022-12-07 DIAGNOSIS — R35.0 BENIGN PROSTATIC HYPERPLASIA WITH URINARY FREQUENCY: ICD-10-CM

## 2022-12-07 DIAGNOSIS — N40.1 BPH ASSOCIATED WITH NOCTURIA: ICD-10-CM

## 2022-12-07 DIAGNOSIS — Z23 NEED FOR VACCINATION: ICD-10-CM

## 2022-12-07 DIAGNOSIS — R35.1 BPH ASSOCIATED WITH NOCTURIA: ICD-10-CM

## 2022-12-07 DIAGNOSIS — N40.1 BENIGN PROSTATIC HYPERPLASIA WITH URINARY FREQUENCY: ICD-10-CM

## 2022-12-07 DIAGNOSIS — L84 CALLUS OF HEEL: ICD-10-CM

## 2022-12-07 DIAGNOSIS — G89.29 CHRONIC RIGHT-SIDED THORACIC BACK PAIN: ICD-10-CM

## 2022-12-07 DIAGNOSIS — M54.6 CHRONIC RIGHT-SIDED THORACIC BACK PAIN: ICD-10-CM

## 2022-12-07 PROCEDURE — 99214 OFFICE O/P EST MOD 30 MIN: CPT | Mod: 25 | Performed by: NURSE PRACTITIONER

## 2022-12-07 PROCEDURE — G0009 ADMIN PNEUMOCOCCAL VACCINE: HCPCS | Performed by: NURSE PRACTITIONER

## 2022-12-07 PROCEDURE — 90677 PCV20 VACCINE IM: CPT | Performed by: NURSE PRACTITIONER

## 2022-12-07 RX ORDER — OMEPRAZOLE 20 MG/1
20 CAPSULE, DELAYED RELEASE ORAL DAILY
Qty: 90 CAPSULE | Refills: 1 | Status: SHIPPED | OUTPATIENT
Start: 2022-12-07 | End: 2023-04-18

## 2022-12-07 RX ORDER — TAMSULOSIN HYDROCHLORIDE 0.4 MG/1
0.8 CAPSULE ORAL DAILY
Qty: 120 CAPSULE | Refills: 0 | Status: SHIPPED | OUTPATIENT
Start: 2022-12-07 | End: 2023-03-08

## 2022-12-07 RX ORDER — AMMONIUM LACTATE 12 G/100G
1 LOTION TOPICAL 3 TIMES DAILY PRN
Qty: 500 G | Refills: 1 | Status: SHIPPED | OUTPATIENT
Start: 2022-12-07 | End: 2023-04-18

## 2022-12-07 ASSESSMENT — FIBROSIS 4 INDEX: FIB4 SCORE: 1.14

## 2022-12-07 NOTE — PROGRESS NOTES
Chief Complaint   Patient presents with    Other     Prostate concerns       HISTORY OF PRESENT ILLNESS: Patient is a 72 y.o. male, established patient who presents today to discuss medical problems as listed below:    Health Maintenance:  COMPLETED     Callus of heel  Chronic problem. Callus on both heels, no tx tried. Pain when skin cracks. broken skin and cracks in left heel, no bleeding.    Benign prostatic hyperplasia with urinary frequency  Chronic problem. Nocturnal frequency x 3 times per night. Feeling of bladder being full after urination, denies dribbling. Started on Flomax    Chronic right-sided thoracic back pain  Chronic intermittent pain in mid back, over on the right side. Previously did PT, last night did some gentle stretching and felt better. Would like to hold off on interventions and imaging today and continue stretching.    Patient Active Problem List    Diagnosis Date Noted    Chronic right-sided thoracic back pain 12/07/2022    Hospital discharge follow-up 10/26/2022    Sinus congestion 10/26/2022    Gastroesophageal reflux disease without esophagitis 10/26/2022    Benign prostatic hyperplasia with urinary frequency 10/26/2022    Acute metabolic encephalopathy 10/21/2022    Seizure-like activity (HCC) 10/21/2022    Aortic calcification (HCC) 10/20/2022    Syncopal episodes 07/01/2022    Callus of heel 11/19/2021    Snoring 02/12/2021    Mitral valve insufficiency 01/08/2021    Elevated BP without diagnosis of hypertension 12/09/2020    Cardiac murmur 12/09/2020    Elevated hemoglobin A1c 11/13/2019    Seasonal allergic rhinitis 11/13/2019    BPH associated with nocturia 10/24/2019    Sacral back pain 10/24/2019    Hyperlipidemia 10/24/2019    HTN (hypertension) 10/24/2019    Vitamin D deficiency 10/24/2019        Allergies: Patient has no known allergies.    Current Outpatient Medications   Medication Sig Dispense Refill    omeprazole (PRILOSEC) 20 MG delayed-release capsule TAKE 1 CAPSULE BY  MOUTH EVERY DAY 90 Capsule 1    ammonium lactate (LAC-HYDRIN) 12 % Lotion Apply 1 Application topically 3 times a day as needed (apply on heels). 500 g 1    tamsulosin (FLOMAX) 0.4 MG capsule Take 2 Capsules by mouth every day. 120 Capsule 0    amLODIPine (NORVASC) 10 MG Tab TAKE 1 TABLET BY MOUTH EVERY DAY 90 Tablet 0    lisinopril (PRINIVIL) 40 MG tablet Take 1 Tablet by mouth every day. 90 Tablet 3    atorvastatin (LIPITOR) 40 MG Tab Take 1 Tablet by mouth at bedtime. 90 Tablet 3    Multiple Vitamins-Minerals (DAILY MULTI VITAMIN/MINERALS PO) Take 1 Tablet by mouth every day.      VITAMIN D PO Take 1 Tablet by mouth every day.       No current facility-administered medications for this visit.       Social History     Tobacco Use    Smoking status: Former     Types: Cigarettes     Start date: 1970     Quit date: 1979     Years since quittin.9    Smokeless tobacco: Never    Tobacco comments:     0.5 x 5 yrs, stopped at age 26   Vaping Use    Vaping Use: Never used   Substance Use Topics    Alcohol use: Not Currently     Comment: occ    Drug use: Never     Social History     Social History Narrative    Not on file       Family History   Problem Relation Age of Onset    Cancer Mother         br ca    Heart Disease Mother     Cancer Father     Cancer Brother         brain ca       Allergies, past medical history, past surgical history, family history, social history reviewed and updated.    Review of Systems:     - Constitutional: Negative for fever, chills, unexpected weight change, and fatigue/generalized weakness.     - Respiratory: Negative for cough, sputum production, chest congestion, dyspnea, wheezing, and crackles.      - Cardiovascular: Negative for chest pain, palpitations, orthopnea, and bilateral lower extremity edema.      - Genitourinary: nocturnal frequency. Negative for dysuria, polyuria, hematuria, pyuria, urinary urgency, and urinary incontinence.    - Musculoskeletal: mid back pain.  "Negative for myalgias, back pain, and joint pain.    - Skin: calluses in both heels    - Psychiatric/Behavioral: Negative for depression, suicidal/homicidal ideation and memory loss.      All other systems reviewed and are negative    Exam:    /62 (BP Location: Left arm, Patient Position: Sitting, BP Cuff Size: Adult)   Pulse (!) 54   Temp 36.3 °C (97.4 °F) (Temporal)   Resp 18   Ht 1.71 m (5' 7.32\")   Wt 95 kg (209 lb 7 oz)   SpO2 98%   BMI 32.49 kg/m²  Body mass index is 32.49 kg/m².    Physical Exam:  Constitutional: Well-developed and well-nourished. Not diaphoretic. No distress.   Skin: dry cracked skin in both heels, L>R  Cardiovascular: Regular rate and rhythm, S1 and S2 without murmur, rubs, or gallops.    Chest: Effort normal. Clear to auscultation throughout. No adventitious sounds.   Musculoskeletal: All major joints AROM full in all directions without pain.  Neurological: Alert and oriented x 3.   Psychiatric:  Behavior, mood, and affect are appropriate.  MA/nursing note and vitals reviewed.    Assessment/Plan:  1. Callus of heel  Soak in warm water with baking soda, filing for heels in Right heel. May need an intervention from podiatry for the left heel.  - ammonium lactate (LAC-HYDRIN) 12 % Lotion; Apply 1 Application topically 3 times a day as needed (apply on heels).  Dispense: 500 g; Refill: 1  - Referral to Podiatry    2. BPH associated with nocturia  Trial of taking 0.8 mg daily. Will f/u in 2-3 wks  - tamsulosin (FLOMAX) 0.4 MG capsule; Take 2 Capsules by mouth every day.  Dispense: 120 Capsule; Refill: 0    3. Benign prostatic hyperplasia with urinary frequency  As discussed in 2    4. Need for vaccination  - Pneumococcal Conjugate Vaccine 20-Valent (19 yrs+)    5. Chronic right-sided thoracic back pain  Pt to continue gentle stretching at home. He declined wrk up today, come for evaluation if not improving       Discussed with patient possible alternative diagnoses, patient is to " take all medications as prescribed.      If symptoms persist FU w/PCP, if symptoms worsen go to emergency room.      If experiencing any side effects from prescribed medications report to the office immediately or go to emergency room.     Reviewed indication, dosage, usage and potential adverse effects of prescribed medications.      Reviewed risks and benefits of treatment plan. Patient verbalizes understanding of all instruction and verbally agrees to plan.     Discussed plan with the patient, and patient agrees to the above.      I personally reviewed prior external notes and test results pertinent to today's visit.      No follow-ups on file. 2-3 wks

## 2022-12-07 NOTE — ASSESSMENT & PLAN NOTE
Chronic intermittent pain in mid back, over on the right side. Previously did PT, last night did some gentle stretching and felt better. Would like to hold off on interventions and imaging today and continue stretching.

## 2022-12-07 NOTE — ASSESSMENT & PLAN NOTE
Chronic problem. Nocturnal frequency x 3 times per night. Feeling of bladder being full after urination, denies dribbling. Started on Flomax

## 2022-12-07 NOTE — ASSESSMENT & PLAN NOTE
Chronic problem. Callus on both heels, no tx tried. Pain when skin cracks. broken skin and cracks in left heel, no bleeding.

## 2022-12-19 ENCOUNTER — OFFICE VISIT (OUTPATIENT)
Dept: SLEEP MEDICINE | Facility: MEDICAL CENTER | Age: 72
End: 2022-12-19
Payer: MEDICARE

## 2022-12-19 VITALS
HEIGHT: 67 IN | OXYGEN SATURATION: 96 % | DIASTOLIC BLOOD PRESSURE: 62 MMHG | WEIGHT: 217 LBS | BODY MASS INDEX: 34.06 KG/M2 | RESPIRATION RATE: 16 BRPM | SYSTOLIC BLOOD PRESSURE: 120 MMHG | HEART RATE: 60 BPM

## 2022-12-19 DIAGNOSIS — I70.0 AORTIC CALCIFICATION (HCC): ICD-10-CM

## 2022-12-19 DIAGNOSIS — I34.0 MITRAL VALVE INSUFFICIENCY, UNSPECIFIED ETIOLOGY: ICD-10-CM

## 2022-12-19 DIAGNOSIS — I15.9 SECONDARY HYPERTENSION: ICD-10-CM

## 2022-12-19 DIAGNOSIS — G47.31 COMPLEX SLEEP APNEA SYNDROME: ICD-10-CM

## 2022-12-19 DIAGNOSIS — G47.34 NOCTURNAL HYPOXIA: ICD-10-CM

## 2022-12-19 PROBLEM — R09.81 NASAL CONGESTION: Status: ACTIVE | Noted: 2022-12-19

## 2022-12-19 PROBLEM — J32.4 CHRONIC PANSINUSITIS: Status: ACTIVE | Noted: 2022-12-19

## 2022-12-19 PROBLEM — J34.3 HYPERTROPHY OF NASAL TURBINATES: Status: ACTIVE | Noted: 2022-12-19

## 2022-12-19 PROCEDURE — 99213 OFFICE O/P EST LOW 20 MIN: CPT | Performed by: NURSE PRACTITIONER

## 2022-12-19 RX ORDER — CEFDINIR 300 MG/1
300 CAPSULE ORAL 2 TIMES DAILY
COMMUNITY
End: 2023-01-26

## 2022-12-19 RX ORDER — METHYLPREDNISOLONE 4 MG/1
4 TABLET ORAL DAILY
COMMUNITY
End: 2023-01-26

## 2022-12-19 ASSESSMENT — FIBROSIS 4 INDEX: FIB4 SCORE: 1.14

## 2022-12-19 NOTE — PROGRESS NOTES
Chief Complaint   Patient presents with    Follow-Up     Last  seen 11/8/2022     Results     Sleep study 11/21/2022        HPI:      Mr. Raines is a 73 y/o male patient who is in today for CSA f/u and sleep study results. PMH includes BPH, hyperlipidemia, hypertension, mitral valve insufficiency, cardiac murmur, aortic calcification, complex sleep apnea, nocturnal hypoxia, chronic pansinusitis, GERD, hypertrophy of nasal turbinates, syncopal episode, former smoker, tonsillectomy, obesity.    Patient is accompanied by his wife.  He goes to bed at 11 pm and wakes up between 6-6:30 am. He denies morning headache, but reports snoring.  He does awaken frequently at night due to nocturia.  He is tired during the day.  Patient's wife reports that she found him gasping for air and snoring in October 2022 but was not able to awaken him.  She called 911 and EMS was also not able to awaken the patient.  He finally came to while he was in the hospital where he did spend the night and was discharged on supplemental O2.  He obtained the oxygen concentrator through [facility] home [facility].  Patient is also working with Dr. Manning ENT for chronic nasal congestion which at this time they are managing conservatively with antibiotics.  They may consider surgery if antibiotics are not effective.  Patient does follow-up with cardiology routinely.  He is not very active but plans to start exercising.    Sleep/Card Study History:   PSG study from 11/21/22 indicated severe obstructive sleep apnea with overall AHI 57.4. Central apneas accounted for 45% of respiratory events. Nocturnal hypoxia likely secondary to untreated sleep apnea minimum oxygen saturation 79% time at or below 88% saturation 32.7 minutes. PLM's noted. Sleep was fragmented with a poor sleep efficiency of 60%     Echo from 7/6/22 indicated normal left ventricular size and systolic function. Left ventricular ejection fraction estimated to be 60%. Mildly dilated left atrium with  mild mitral regurgitation. Unable to estimate pulmonary artery pressure due to an inadequate tricuspid regurgitant jet. Normal aortic root for body surface area. The ascending aorta is dilated with a diameter of 4.1 cm.       ROS:    Constitutional: Denies fevers, Denies weight changes  Eyes: Denies changes in vision, no eye pain  Ears/Nose/Throat/Mouth: Denies nasal congestion or sore throat   Cardiovascular: Denies chest pain or palpitations   Respiratory: Denies shortness of breath , Denies cough  Gastrointestinal/Hepatic: Denies abdominal pain, nausea, vomiting,   Skin/Breast: Denies rash,   Neurological: Denies headache, confusion,   Psychiatric: denies mood disorder   Sleep: + snoring, + gasping for air      Past Medical History:   Diagnosis Date    Apnea, sleep     Arrhythmia 2021    BPH associated with nocturia     Chickenpox     Frequent urination     Upper sorbian measles     Snoring     Wears glasses        Past Surgical History:   Procedure Laterality Date    APPENDECTOMY      TONSILLECTOMY         Family History   Problem Relation Age of Onset    Cancer Mother         br ca    Heart Disease Mother     Cancer Father     Cancer Brother         brain ca       Social History     Socioeconomic History    Marital status:      Spouse name: Not on file    Number of children: Not on file    Years of education: Not on file    Highest education level: Bachelor's degree (e.g., BA, AB, BS)   Occupational History    Occupation: retired   Tobacco Use    Smoking status: Former     Types: Cigarettes     Start date: 1970     Quit date: 1979     Years since quittin.9    Smokeless tobacco: Never    Tobacco comments:     0.5 x 5 yrs, stopped at age 26   Vaping Use    Vaping Use: Never used   Substance and Sexual Activity    Alcohol use: Not Currently     Comment: occ    Drug use: Never    Sexual activity: Yes     Partners: Female     Comment:    Other Topics Concern    Not on file   Social  History Narrative    Not on file     Social Determinants of Health     Financial Resource Strain: Low Risk     Difficulty of Paying Living Expenses: Not hard at all   Food Insecurity: No Food Insecurity    Worried About Running Out of Food in the Last Year: Never true    Ran Out of Food in the Last Year: Never true   Transportation Needs: No Transportation Needs    Lack of Transportation (Medical): No    Lack of Transportation (Non-Medical): No   Physical Activity: Insufficiently Active    Days of Exercise per Week: 2 days    Minutes of Exercise per Session: 30 min   Stress: No Stress Concern Present    Feeling of Stress : Only a little   Social Connections: Socially Integrated    Frequency of Communication with Friends and Family: Twice a week    Frequency of Social Gatherings with Friends and Family: Once a week    Attends L[employer] Services: More than 4 times per year    Active Member of Clubs or Organizations: Yes    Attends Club or Organization Meetings: 1 to 4 times per year    Marital Status:    Intimate Partner Violence: Not on file   Housing Stability: Unknown    Unable to Pay for Housing in the Last Year: Patient refused    Number of Places Lived in the Last Year: 1    Unstable Housing in the Last Year: No       Allergies as of 12/19/2022    (No Known Allergies)        Vitals:  Vitals:    12/19/22 1000   BP: 120/62   Pulse: 60   Resp: 16   SpO2: 96%       Current medications as of today   Current Outpatient Medications   Medication Sig Dispense Refill    cefdinir (OMNICEF) 300 MG Cap Take 300 mg by mouth 2 times a day.      methylPREDNISolone (MEDROL DOSEPAK) 4 MG Tablet Therapy Pack Take 4 mg by mouth every day. Follow schedule on package instructions.      omeprazole (PRILOSEC) 20 MG delayed-release capsule TAKE 1 CAPSULE BY MOUTH EVERY DAY 90 Capsule 1    ammonium lactate (LAC-HYDRIN) 12 % Lotion Apply 1 Application topically 3 times a day as needed (apply on heels). 500 g 1    tamsulosin  (FLOMAX) 0.4 MG capsule Take 2 Capsules by mouth every day. 120 Capsule 0    amLODIPine (NORVASC) 10 MG Tab TAKE 1 TABLET BY MOUTH EVERY DAY 90 Tablet 0    lisinopril (PRINIVIL) 40 MG tablet Take 1 Tablet by mouth every day. 90 Tablet 3    atorvastatin (LIPITOR) 40 MG Tab Take 1 Tablet by mouth at bedtime. 90 Tablet 3    Multiple Vitamins-Minerals (DAILY MULTI VITAMIN/MINERALS PO) Take 1 Tablet by mouth every day.      VITAMIN D PO Take 1 Tablet by mouth every day.       No current facility-administered medications for this visit.         Physical Exam: Limited by COVID-19 precautions.  Appearance: Well developed, well nourished, no acute distress  Eyes: PERRL, EOM intact, sclera white, conjunctiva moist  Ears: no lesions or deformities  Hearing: grossly intact  Nose: no lesions or deformities  Respiratory effort: no intercostal retractions or use of accessory muscles  Extremities: no cyanosis or edema  Abdomen: soft   Gait and Station: normal  Digits and nails: no clubbing, cyanosis, petechiae or nodes.  Cranial nerves: grossly intact  Skin: no visible rashes, lesions or ulcers noted  Orientation: Oriented to time, person and place  Mood and affect: mood and affect appropriate, normal interaction with examiner  Judgement: Intact    Assessment:  1. Complex sleep apnea syndrome  Polysomnography Titration      2. Nocturnal hypoxia  Polysomnography Titration      3. Mitral valve insufficiency, unspecified etiology        4. Secondary hypertension        5. Aortic calcification (HCC)        6. BMI 33.0-33.9,adult  Patient identified as having weight management issue.  Appropriate orders and counseling given.            Plan  Discussed the cardiovascular and neuropsychiatric risks of untreated SHIRA; including but not limited to: HTN, DM, MI, ASCVD, CVA, CHF, traffic accidents.     1-2. PSG study from 11/21/22 indicated severe obstructive sleep apnea with overall AHI 57.4. Central apneas accounted for 45% of respiratory  events. Nocturnal hypoxia likely secondary to untreated sleep apnea minimum oxygen saturation 79% time at or below 88% saturation 32.7 minutes. PLM's noted. Sleep was fragmented with a poor sleep efficiency of 60%  Recommendation:  Recommend that the patient should return for a CPAP/BiPAP/ASV titration due to the severity of sleep apnea and elevation of central apneic events.  Other treatment options including mandibular advancement device, UPPP, positional and weight loss therapy were also reviewed with the patient today.  Patient is also advised to avoid the supine position, alcohol 4 hours prior to bedtime, sedatives and opioids as all can exacerbate apnea.    *Patient is amenable to titration study.  Order PSG CPAP/BiPAP/possible ASV titration STAT.  Would recommend moving through CPAP?BiPAP titration quicker if ASV therapy will be indicated.  Continue to use supplemental O2 at 2 L.  Will message patient with results when available initiate therapy  STAT as well as supplemental O2 if indicated based on study results to which the patient is amenable to.  We will utilize [facility] home care.  Advised patient to use the machine every night for more than four hours for optimal health benefit and to meet the health insurance 70% compliance guideline. Advised patient he may change the mask out if needed within the first 30 days after he receives the equipment.     *Sleep hygiene discussed. Recommend keeping a set sleep/wake schedule. Logging enough hours of sleep. Limiting/Avoiding naps. No caffeine after noon and no heavy meals in the evening.     3-5. Continue to f/u with cardiology, Dr. Donohue.   6. Encouraged a healthy diet and exercise to help with weight loss and management.   If your BMI is 25-29.9 you are overweight. If your BMI is 30 or greater you are obese. To lose weight eat less, move more, or both. Any diet that reduces caloric intake can help with weight loss. Extra weight may reduce your lifespan.  Avoid dramatic unsustainable dietary changes that result in the yo-yo effect (down then back up.)  Usually small modifications in diet and exercise are easier to stick with.  7. Follow up with appropriate healthcare providers for all other medical problems.  8. F/u in 3 months for first compliance, GRAHAM Ayala.      This dictation was created using voice recognition software. The accuracy of the dictation is limited to the abilities of the software. I expect there may be some errors of grammar and possibly content.

## 2022-12-30 DIAGNOSIS — I15.9 SECONDARY HYPERTENSION: ICD-10-CM

## 2022-12-30 RX ORDER — AMLODIPINE BESYLATE 10 MG/1
10 TABLET ORAL DAILY
Qty: 90 TABLET | Refills: 0 | Status: SHIPPED | OUTPATIENT
Start: 2022-12-30 | End: 2023-04-04

## 2022-12-30 NOTE — TELEPHONE ENCOUNTER
Received request via: Pharmacy    Was the patient seen in the last year in this department? Yes    Does the patient have an active prescription (recently filled or refills available) for medication(s) requested? No    Does the patient have care home Plus and need 100 day supply (blood pressure, diabetes and cholesterol meds only)? Patient does not have SCP

## 2023-01-12 ENCOUNTER — SLEEP STUDY (OUTPATIENT)
Dept: SLEEP MEDICINE | Facility: MEDICAL CENTER | Age: 73
End: 2023-01-12
Attending: NURSE PRACTITIONER
Payer: MEDICARE

## 2023-01-12 DIAGNOSIS — G47.31 COMPLEX SLEEP APNEA SYNDROME: ICD-10-CM

## 2023-01-12 DIAGNOSIS — G47.34 NOCTURNAL HYPOXIA: ICD-10-CM

## 2023-01-12 PROCEDURE — 95811 POLYSOM 6/>YRS CPAP 4/> PARM: CPT | Performed by: STUDENT IN AN ORGANIZED HEALTH CARE EDUCATION/TRAINING PROGRAM

## 2023-01-13 NOTE — PROCEDURES
MONTAGE: Standard  STUDY TYPE: Treatment    RECORDING TECHNIQUE:   After the scalp was prepared, gold plated electrodes were applied to the scalp according to the International 10-20 System. EEG (electroencephalogram) was continuously monitored from the O1-M2, O2-M1, C3-M2, C4-M1, F3-M2, and F4-M1. EOGs (electrooculograms) were monitored by electrodes placed at the left and right outer canthi. Chin EMG (electromyogram) was monitored by electrodes placed on the mentalis and sub-mentalis muscles. Nasal and oral airflow were monitored using a triple port thermocouple as well as oronasal pressure transducer. Respiratory effort was measured by inductive plethysmography technology employing abdominal and thoracic belts. Blood oxygen saturation and pulse were monitored by pulse oximetry. Heart rhythm was monitored by surface electrocardiogram. Leg EMG was studied using surface electrodes placed on left and right anterior tibialis. A microphone was used to monitor tracheal sounds and snoring. Body position was monitored and documented by technician observation.   SCORING CRITERIA:   A modification of the AASM manual for scoring of sleep and associated events was used. Obstructive apneas were scored by cessation of airflow for at least 10 seconds with continuing respiratory effort. Central apneas were scored by cessation of airflow for at least 10 seconds with no respiratory effort. Hypopneas were scored by a 30% or more reduction in airflow for at least 10 seconds accompanied by arterial oxygen desaturation of 3% or an arousal. For CMS (Medicare) patients, per AASM rule 1B, hypopneas are scored by 30% with mild reduction in airflow for at least 10 seconds accompanied by arterial saturation decreased at 4%.    Study start time was 10:17:52 PM. Diagnostic recording time was 6h 38.0m with a total sleep time of 3h 29.0m resulting in a sleep efficiency of 52.51%%. Sleep latency from the start of the study was 09 minutes and the  latency from sleep to REM was 265 minutes. In total,92 arousals were scored for an arousal index of 26.4.  Respiratory:  There were a total of 56 apneas consisting of 1 obstructive apneas, 0 mixed apneas, and 55 central apneas. A total of 18 hypopneas were scored. The apnea index was 16.08 per hour and the hypopnea index was 5.17 per hour resulting in an overall AHI of 21.24. AHI during rem was 0.0 and AHI while supine was 18.91.  Central apneas and hypopneas accounted for 75.7% of respiratory events  Oximetry:  There was a mean oxygen saturation of 93.0%. The minimum oxygen saturation in NREM was 84.0 % and in REM was 89.0%. The patient spent 5.1 minutes of TST with SaO2 <88%.  Cardiac:  The highest heart rate seen while awake was 88 BPM while the highest heart rate during sleep was 77 BPM with an average sleeping heart rate of 55 BPM.  Limb Movements:  There were a total of 361 PLMs during sleep which resulted in a PLMS index of 103.6. Of these, 54 were associated with arousals which resulted in a PLMS arousal index of 15.5.  Titration: CPAP was tried from 5-6cm H2O. Bipap was tried from 8/4-10/6cm H2O. ASV was tried with EPAP 5-7cm H2O, Min PS 3cm H2O, Max PS 10-15cm H2O  This was a fully attended sleep study. This test was technically adequate. This patient was titrated on CPAP starting at 5 cm of water pressure. Patient was titrated up to ASV EPAP 7 PS 3-15 cm of water pressure. Patient did best at ASV EPAP 5 pressure support 3-15 cm of water pressure. Patient spent 134 minutes at that pressure and their AHI was 2.7 which is considered treated obstructive sleep apnea.     Impression:  1.  Complex sleep apnea with elevated central respiratory events accounting for 75.7% of respiratory events and an overall AHI of 21.24  2.  Patient was tried on CPAP, BiPAP and ASV therapy  3.  Responded best to ASV therapy  4.  Supine REM sleep was seen on ASV  5.  PLM's noted  6.  Nocturnal hypoxia improved with control of  respiratory events    Recommendations:  I recommend ASV EPAP 5 pressure support 3-15 cm .  Patient used a large AirFit N 20 mask during night of study.     I also recommend 60 day compliance download to assess the efficacy to the recommended pressure, measure leak, apnea hypopnea index and compliance for further outpatient monitoring and management of PAP therapy. In some cases alternative treatment options may be proven effective in resolving sleep apnea. These options include upper airway surgery, the use of a dental orthotic, weight loss, or positional therapy. Clinical correlation is required. In general patients with sleep apnea are advised to avoid alcohol, sedatives and not to operate a motor vehicle while drowsy.  Untreated sleep apnea increases the risk for cardiovascular and neurovascular disease.

## 2023-01-26 ENCOUNTER — OFFICE VISIT (OUTPATIENT)
Dept: SLEEP MEDICINE | Facility: MEDICAL CENTER | Age: 73
End: 2023-01-26
Payer: MEDICARE

## 2023-01-26 VITALS
DIASTOLIC BLOOD PRESSURE: 50 MMHG | RESPIRATION RATE: 16 BRPM | OXYGEN SATURATION: 95 % | WEIGHT: 212 LBS | HEART RATE: 57 BPM | BODY MASS INDEX: 33.27 KG/M2 | SYSTOLIC BLOOD PRESSURE: 130 MMHG | HEIGHT: 67 IN

## 2023-01-26 DIAGNOSIS — G47.31 COMPLEX SLEEP APNEA SYNDROME: ICD-10-CM

## 2023-01-26 DIAGNOSIS — I15.9 SECONDARY HYPERTENSION: ICD-10-CM

## 2023-01-26 DIAGNOSIS — I34.0 MITRAL VALVE INSUFFICIENCY, UNSPECIFIED ETIOLOGY: ICD-10-CM

## 2023-01-26 DIAGNOSIS — I70.0 AORTIC CALCIFICATION (HCC): ICD-10-CM

## 2023-01-26 DIAGNOSIS — G47.34 NOCTURNAL HYPOXIA: ICD-10-CM

## 2023-01-26 DIAGNOSIS — J32.4 CHRONIC PANSINUSITIS: ICD-10-CM

## 2023-01-26 DIAGNOSIS — J34.3 HYPERTROPHY OF NASAL TURBINATES: ICD-10-CM

## 2023-01-26 PROCEDURE — 99213 OFFICE O/P EST LOW 20 MIN: CPT | Performed by: NURSE PRACTITIONER

## 2023-01-26 ASSESSMENT — FIBROSIS 4 INDEX: FIB4 SCORE: 1.14

## 2023-01-26 NOTE — PROGRESS NOTES
Chief Complaint   Patient presents with    Apnea     CSA-Last Seen 12/19/2022 ERIKA Archuleta APRN    Results     Sleep Study 01/12/2023       HPI:      Mr. Raines is a 73 y/o male patient who is in today for CSA f/u and sleep study results. PMH includes BPH, hyperlipidemia, hypertension, mitral valve insufficiency, cardiac murmur, aortic calcification, complex sleep apnea, nocturnal hypoxia, chronic pansinusitis, GERD, hypertrophy of nasal turbinates, syncopal episode, former smoker, tonsillectomy, obesity.    Patient is accompanied by his wife.  He goes to bed at 11 pm and wakes up between 6-6:30 am. He denies morning headache, but reports snoring.  He does awaken frequently at night due to nocturia.  He is tired during the day.  Patient's wife reports that she found him gasping for air and snoring in October 2022 but was not able to awaken him.  She called 911 and EMS was also not able to awaken the patient.  He finally came to while he was in the hospital where he did spend the night and was discharged on supplemental O2 2L to use at night.  He obtained the oxygen concentrator [facility].  Patient is also working with Dr. Manning ENT for chronic nasal congestion.  He tried to separate antibiotics and steroids but continued to have some sinus infection.  He is planning to undergo a laser sinus surgery on 2/6/2023.  Patient does follow-up with cardiology routinely.  He is not very active but plans to start exercising.      Sleep/Card Study History:   PSG titration study from 1/12/23 indicated CPAP was tried from 5-6cm H2O. Bipap was tried from 8/4-10/6cm H2O. ASV was tried with EPAP 5-7cm H2O, Min PS 3cm H2O, Max PS 10-15cm H2O. This was a fully attended sleep study. This test was technically adequate. This patient was titrated on CPAP starting at 5 cm of water pressure. Patient was titrated up to ASV EPAP 7 PS 3-15 cm of water pressure. Patient did best at ASV EPAP 5 pressure support 3-15 cm of water  pressure. Patient spent 134 minutes at that pressure and their AHI was 2.7 which is considered treated obstructive sleep apnea. There was a mean oxygen saturation of 93.0%. The minimum oxygen saturation in NREM was 84.0 % and in REM was 89.0%. The patient spent 5.1 minutes of TST with SaO2 <88%. Recommend ASV EPAP 5 pressure support 3-15 cm. Patient used a large AirFit N 20 mask during night of study.    PSG study from 11/21/22 indicated severe obstructive sleep apnea with overall AHI 57.4. Central apneas accounted for 45% of respiratory events. Nocturnal hypoxia likely secondary to untreated sleep apnea minimum oxygen saturation 79% time at or below 88% saturation 32.7 minutes. PLM's noted. Sleep was fragmented with a poor sleep efficiency of 60%     Echo from 7/6/22 indicated normal left ventricular size and systolic function. Left ventricular ejection fraction estimated to be 60%. Mildly dilated left atrium with mild mitral regurgitation. Unable to estimate pulmonary artery pressure due to an inadequate tricuspid regurgitant jet. Normal aortic root for body surface area. The ascending aorta is dilated with a diameter of 4.1 cm.       ROS:    Constitutional: Denies fevers, Denies weight changes  Eyes: Denies changes in vision, no eye pain  Ears/Nose/Throat/Mouth: Denies nasal congestion or sore throat   Cardiovascular: Denies chest pain or palpitations   Respiratory: Denies shortness of breath , Denies cough  Gastrointestinal/Hepatic: Denies abdominal pain, nausea, vomiting,   Skin/Breast: Denies rash,   Neurological: Denies headache, confusion,   Psychiatric: denies mood disorder   Sleep: + snoring, denies morning headache      Past Medical History:   Diagnosis Date    Apnea, sleep     Arrhythmia 09/02/2021    BPH associated with nocturia     Chickenpox     Frequent urination     Persian measles     Snoring     Wears glasses        Past Surgical History:   Procedure Laterality Date    APPENDECTOMY  1990     TONSILLECTOMY         Family History   Problem Relation Age of Onset    Cancer Mother         br ca    Heart Disease Mother     Cancer Father     Cancer Brother         brain ca       Social History     Socioeconomic History    Marital status:      Spouse name: Not on file    Number of children: Not on file    Years of education: Not on file    Highest education level: Bachelor's degree (e.g., BA, AB, BS)   Occupational History    Occupation: retired   Tobacco Use    Smoking status: Former     Types: Cigarettes     Start date: 1970     Quit date: 1979     Years since quittin.0    Smokeless tobacco: Never    Tobacco comments:     0.5 x 5 yrs, stopped at age 26   Vaping Use    Vaping Use: Never used   Substance and Sexual Activity    Alcohol use: Not Currently     Comment: occ    Drug use: Never    Sexual activity: Yes     Partners: Female     Comment:    Other Topics Concern    Not on file   Social History Narrative    Not on file     Social Determinants of Health     Financial Resource Strain: Low Risk     Difficulty of Paying Living Expenses: Not hard at all   Food Insecurity: No Food Insecurity    Worried About Running Out of Food in the Last Year: Never true    Ran Out of Food in the Last Year: Never true   Transportation Needs: No Transportation Needs    Lack of Transportation (Medical): No    Lack of Transportation (Non-Medical): No   Physical Activity: Insufficiently Active    Days of Exercise per Week: 2 days    Minutes of Exercise per Session: 30 min   Stress: No Stress Concern Present    Feeling of Stress : Only a little   Social Connections: Socially Integrated    Frequency of Communication with Friends and Family: Twice a week    Frequency of Social Gatherings with Friends and Family: Once a week    Attends Alevism Services: More than 4 times per year    Active Member of Clubs or Organizations: Yes    Attends Club or Organization Meetings: 1 to 4 times per year    Marital  Status:    Intimate Partner Violence: Not on file   Housing Stability: Unknown    Unable to Pay for Housing in the Last Year: Patient refused    Number of Places Lived in the Last Year: 1    Unstable Housing in the Last Year: No       Allergies as of 01/26/2023    (No Known Allergies)        Vitals:  Vitals:    01/26/23 1122   BP: 130/50   Pulse: (!) 57   Resp: 16   SpO2: 95%       Current medications as of today   Current Outpatient Medications   Medication Sig Dispense Refill    lisinopril (PRINIVIL) 40 MG tablet TAKE 1 TABLET BY MOUTH EVERY DAY 90 Tablet 1    amLODIPine (NORVASC) 10 MG Tab TAKE 1 TABLET BY MOUTH EVERY DAY 90 Tablet 0    omeprazole (PRILOSEC) 20 MG delayed-release capsule TAKE 1 CAPSULE BY MOUTH EVERY DAY 90 Capsule 1    ammonium lactate (LAC-HYDRIN) 12 % Lotion Apply 1 Application topically 3 times a day as needed (apply on heels). 500 g 1    tamsulosin (FLOMAX) 0.4 MG capsule Take 2 Capsules by mouth every day. 120 Capsule 0    atorvastatin (LIPITOR) 40 MG Tab Take 1 Tablet by mouth at bedtime. 90 Tablet 3    Multiple Vitamins-Minerals (DAILY MULTI VITAMIN/MINERALS PO) Take 1 Tablet by mouth every day.      VITAMIN D PO Take 1 Tablet by mouth every day.       No current facility-administered medications for this visit.         Physical Exam: Limited by COVID-19 precautions.  Appearance: Well developed, well nourished, no acute distress  Eyes: PERRL, EOM intact, sclera white, conjunctiva moist  Ears: no lesions or deformities  Hearing: grossly intact  Nose: no lesions or deformities  Respiratory effort: no intercostal retractions or use of accessory muscles  Extremities: no cyanosis or edema  Abdomen: soft  Gait and Station: normal  Digits and nails: no clubbing, cyanosis, petechiae or nodes.  Cranial nerves: grossly intact  Skin: no visible rashes, lesions or ulcers noted  Orientation: Oriented to time, person and place  Mood and affect: mood and affect appropriate, normal interaction  with examiner  Judgement: Intact    Assessment:  1. Complex sleep apnea syndrome  DME ASV      2. Nocturnal hypoxia        3. Chronic pansinusitis        4. Hypertrophy of nasal turbinates        5. Mitral valve insufficiency, unspecified etiology        6. Secondary hypertension        7. Aortic calcification (HCC)        8. BMI 33.0-33.9,adult  Patient identified as having weight management issue.  Appropriate orders and counseling given.            Plan  Discussed the cardiovascular and neuropsychiatric risks of untreated CSA; including but not limited to: HTN, DM, MI, ASCVD, CVA, CHF, traffic accidents.     1.PSG titration study from 1/12/23 indicated CPAP was tried from 5-6cm H2O. Bipap was tried from 8/4-10/6cm H2O. ASV was tried with EPAP 5-7cm H2O, Min PS 3cm H2O, Max PS 10-15cm H2O. This was a fully attended sleep study. This test was technically adequate. This patient was titrated on CPAP starting at 5 cm of water pressure. Patient was titrated up to ASV EPAP 7 PS 3-15 cm of water pressure. Patient did best at ASV EPAP 5 pressure support 3-15 cm of water pressure. Patient spent 134 minutes at that pressure and their AHI was 2.7 which is considered treated obstructive sleep apnea. There was a mean oxygen saturation of 93.0%. The minimum oxygen saturation in NREM was 84.0 % and in REM was 89.0%. The patient spent 5.1 minutes of TST with SaO2 <88%.   Recommendation:  Recommend ASV EPAP 5 pressure support 3-15 cm.  Patient used a large AirFit N 20 mask during night of study.    *Patient is amenable to ASV therapy.  DME order ([employer]) for [employer] ASV EPAP 5 cmH2O, PS min/max 3-15 cmH2O, mask (large Airfit N20 mask or MOC) and supplies was placed today. Clean mask and supplies weekly with soap and water, and change supplies per insurance guidelines. Advised patient to use the ASV every night for more than four hours for optimal health benefit and to meet the health insurance 70% compliance guideline. Advised  patient he may change the mask out if needed within the first 30 days after he receives the equipment.     *Order OPO on ASV EPAP 5 cmH2O, PS min/max 3-15 cmH2O at next follow-up office visit if AHI is well-controlled to rule out nocturnal hypoxia.  If OPO indicates normal O2 saturation on ASV therapy alone may consider discontinuing supplemental O2.     2.  Continue to use supplemental at 2 L at night until ASV therapy can be initiated.  Current oxygen concentrator is through [facility].  3-4.  Continue to follow-up with ENT Dr. Manning.  Patient is pending laser sinus surgery on 2/6/23.  5-7. Continue to f/u with cardiology, Dr. Donohue.   8. Encouraged a healthy diet and exercise to help with weight loss and management.   If your BMI is 25-29.9 you are overweight. If your BMI is 30 or greater you are obese. To lose weight eat less, move more, or both. Any diet that reduces caloric intake can help with weight loss. Extra weight may reduce your lifespan. Avoid dramatic unsustainable dietary changes that result in the yo-yo effect (down then back up.)  Usually small modifications in diet and exercise are easier to stick with.  9. Follow up with appropriate healthcare providers for all other medical problems.  10. F/u in 4 months for first compliance, CSA.       GRAHAM Dowell.      This dictation was created using voice recognition software. The accuracy of the dictation is limited to the abilities of the software. I expect there may be some errors of grammar and possibly content.

## 2023-02-06 ENCOUNTER — HOSPITAL ENCOUNTER (EMERGENCY)
Facility: MEDICAL CENTER | Age: 73
End: 2023-02-06
Attending: EMERGENCY MEDICINE
Payer: MEDICARE

## 2023-02-06 ENCOUNTER — APPOINTMENT (OUTPATIENT)
Dept: RADIOLOGY | Facility: MEDICAL CENTER | Age: 73
End: 2023-02-06
Attending: EMERGENCY MEDICINE
Payer: MEDICARE

## 2023-02-06 VITALS
HEIGHT: 67 IN | DIASTOLIC BLOOD PRESSURE: 72 MMHG | HEART RATE: 69 BPM | TEMPERATURE: 97.4 F | RESPIRATION RATE: 15 BRPM | SYSTOLIC BLOOD PRESSURE: 168 MMHG | WEIGHT: 206 LBS | OXYGEN SATURATION: 95 % | BODY MASS INDEX: 32.33 KG/M2

## 2023-02-06 DIAGNOSIS — R56.9 SEIZURE-LIKE ACTIVITY (HCC): ICD-10-CM

## 2023-02-06 LAB
ALBUMIN SERPL BCP-MCNC: 3.6 G/DL (ref 3.2–4.9)
ALBUMIN/GLOB SERPL: 1.8 G/DL
ALP SERPL-CCNC: 112 U/L (ref 30–99)
ALT SERPL-CCNC: 17 U/L (ref 2–50)
ANION GAP SERPL CALC-SCNC: 13 MMOL/L (ref 7–16)
AST SERPL-CCNC: 16 U/L (ref 12–45)
BASOPHILS # BLD AUTO: 1.1 % (ref 0–1.8)
BASOPHILS # BLD: 0.08 K/UL (ref 0–0.12)
BILIRUB SERPL-MCNC: 0.3 MG/DL (ref 0.1–1.5)
BUN SERPL-MCNC: 23 MG/DL (ref 8–22)
CALCIUM ALBUM COR SERPL-MCNC: 8.4 MG/DL (ref 8.5–10.5)
CALCIUM SERPL-MCNC: 8.1 MG/DL (ref 8.5–10.5)
CHLORIDE SERPL-SCNC: 110 MMOL/L (ref 96–112)
CO2 SERPL-SCNC: 19 MMOL/L (ref 20–33)
CREAT SERPL-MCNC: 0.99 MG/DL (ref 0.5–1.4)
EKG IMPRESSION: NORMAL
EOSINOPHIL # BLD AUTO: 0.21 K/UL (ref 0–0.51)
EOSINOPHIL NFR BLD: 2.9 % (ref 0–6.9)
ERYTHROCYTE [DISTWIDTH] IN BLOOD BY AUTOMATED COUNT: 41.2 FL (ref 35.9–50)
GFR SERPLBLD CREATININE-BSD FMLA CKD-EPI: 81 ML/MIN/1.73 M 2
GLOBULIN SER CALC-MCNC: 2 G/DL (ref 1.9–3.5)
GLUCOSE SERPL-MCNC: 162 MG/DL (ref 65–99)
HCT VFR BLD AUTO: 40.6 % (ref 42–52)
HGB BLD-MCNC: 13.8 G/DL (ref 14–18)
IMM GRANULOCYTES # BLD AUTO: 0.07 K/UL (ref 0–0.11)
IMM GRANULOCYTES NFR BLD AUTO: 1 % (ref 0–0.9)
LYMPHOCYTES # BLD AUTO: 1.72 K/UL (ref 1–4.8)
LYMPHOCYTES NFR BLD: 23.7 % (ref 22–41)
MCH RBC QN AUTO: 30.3 PG (ref 27–33)
MCHC RBC AUTO-ENTMCNC: 34 G/DL (ref 33.7–35.3)
MCV RBC AUTO: 89.2 FL (ref 81.4–97.8)
MONOCYTES # BLD AUTO: 0.54 K/UL (ref 0–0.85)
MONOCYTES NFR BLD AUTO: 7.4 % (ref 0–13.4)
NEUTROPHILS # BLD AUTO: 4.64 K/UL (ref 1.82–7.42)
NEUTROPHILS NFR BLD: 63.9 % (ref 44–72)
NRBC # BLD AUTO: 0 K/UL
NRBC BLD-RTO: 0 /100 WBC
PLATELET # BLD AUTO: 164 K/UL (ref 164–446)
PMV BLD AUTO: 11.2 FL (ref 9–12.9)
POTASSIUM SERPL-SCNC: 3.3 MMOL/L (ref 3.6–5.5)
PROT SERPL-MCNC: 5.6 G/DL (ref 6–8.2)
RBC # BLD AUTO: 4.55 M/UL (ref 4.7–6.1)
SODIUM SERPL-SCNC: 142 MMOL/L (ref 135–145)
TROPONIN T SERPL-MCNC: 11 NG/L (ref 6–19)
WBC # BLD AUTO: 7.3 K/UL (ref 4.8–10.8)

## 2023-02-06 PROCEDURE — 94760 N-INVAS EAR/PLS OXIMETRY 1: CPT

## 2023-02-06 PROCEDURE — 36415 COLL VENOUS BLD VENIPUNCTURE: CPT

## 2023-02-06 PROCEDURE — 84484 ASSAY OF TROPONIN QUANT: CPT

## 2023-02-06 PROCEDURE — 99285 EMERGENCY DEPT VISIT HI MDM: CPT

## 2023-02-06 PROCEDURE — A9270 NON-COVERED ITEM OR SERVICE: HCPCS | Performed by: EMERGENCY MEDICINE

## 2023-02-06 PROCEDURE — 80053 COMPREHEN METABOLIC PANEL: CPT

## 2023-02-06 PROCEDURE — 71045 X-RAY EXAM CHEST 1 VIEW: CPT

## 2023-02-06 PROCEDURE — 700102 HCHG RX REV CODE 250 W/ 637 OVERRIDE(OP): Performed by: EMERGENCY MEDICINE

## 2023-02-06 PROCEDURE — 85025 COMPLETE CBC W/AUTO DIFF WBC: CPT

## 2023-02-06 PROCEDURE — 700105 HCHG RX REV CODE 258: Performed by: EMERGENCY MEDICINE

## 2023-02-06 PROCEDURE — 70450 CT HEAD/BRAIN W/O DYE: CPT

## 2023-02-06 PROCEDURE — 93005 ELECTROCARDIOGRAM TRACING: CPT | Performed by: EMERGENCY MEDICINE

## 2023-02-06 RX ORDER — LEVETIRACETAM 500 MG/1
500 TABLET ORAL ONCE
Status: COMPLETED | OUTPATIENT
Start: 2023-02-06 | End: 2023-02-06

## 2023-02-06 RX ORDER — LEVETIRACETAM 500 MG/1
500 TABLET ORAL 2 TIMES DAILY
Qty: 60 TABLET | Refills: 0 | Status: SHIPPED | OUTPATIENT
Start: 2023-02-06 | End: 2023-02-27 | Stop reason: SDUPTHER

## 2023-02-06 RX ORDER — SODIUM CHLORIDE, SODIUM LACTATE, POTASSIUM CHLORIDE, CALCIUM CHLORIDE 600; 310; 30; 20 MG/100ML; MG/100ML; MG/100ML; MG/100ML
1000 INJECTION, SOLUTION INTRAVENOUS ONCE
Status: COMPLETED | OUTPATIENT
Start: 2023-02-06 | End: 2023-02-06

## 2023-02-06 RX ADMIN — SODIUM CHLORIDE, POTASSIUM CHLORIDE, SODIUM LACTATE AND CALCIUM CHLORIDE 1000 ML: 600; 310; 30; 20 INJECTION, SOLUTION INTRAVENOUS at 08:54

## 2023-02-06 RX ADMIN — LEVETIRACETAM 500 MG: 500 TABLET, FILM COATED ORAL at 09:14

## 2023-02-06 ASSESSMENT — FIBROSIS 4 INDEX: FIB4 SCORE: 1.14

## 2023-02-06 NOTE — ED NOTES
Pt discharged home as ordered by erp. Pt instructed to follow up with neurology and return here as needed. Pt instructed on where to  RX.

## 2023-02-06 NOTE — ED TRIAGE NOTES
Chief Complaint   Patient presents with    Seizure     Pt arrives by EMS from home with complaint of possible seizure. Pt was witnessed by family standing up, shaking for 1 minute, and falling to ground. Pt became unconscious, family began CPR for 2 minutes. Upon EMS arrival, pt GCS 14, vitally stable. Pt arrives to ER aox2. Follows commands. Airway patent, rr even and unlabored.

## 2023-02-06 NOTE — ED PROVIDER NOTES
ED Provider Note    CHIEF COMPLAINT  Chief Complaint   Patient presents with    Seizure       EXTERNAL RECORDS REVIEWED    Post recent outpatient encounter was in the pulmonary clinic late last month, January 26 for follow-up on sleep study results.  Noted to have a history of hyperlipidemia, hypertension, mitral valve insufficiency,    Patient was admitted to the hospital in October of last year for altered mental status.  Prior imaging included CTA of the chest and however of last year for complaint of shortness of breath.  There is no evidence of pulmonary embolism or consolidation.  Patient also underwent MRI of the brain with and without contrast at that time as well as CT imaging of the head without contrast.  Patient noted to be history of acute metabolic encephalopathy.  Other than chronic microvascular ischemic changes and cerebral volume loss, that these imaging studies of the head were unrevealing.  Echocardiogram from July of last year with comparison from December 2020, noted normal EF of 60%.  Patient was also seen by neurology during this admission and had a normal EEG.    HPI/ROS  LIMITATION TO HISTORY   Patients recollection    OUTSIDE HISTORIAN(S):  EMS, wife, Grandson    Ravindra Raines is a 72 y.o. male who presents to the emergency department, via EMS.  He is accompanied to the ED by family, wife and grandson.  Wife provides most of the history.  The patient does not recollect this event.  His wife states that she was awakened to him calling out in his sleep.  He seemed unresponsive when she tried to wake him.  This happened previously in October.  She immediately called 911 when he was unresponsive and was instructed to get him to the ED for and started CPR, which she did.  There was some muscle activity noted.  Upon EMS arrival, patient continued to be unresponsive although he was breathing on his own.  Sent notes that he has been in his normal state of health recently.  He was due to have  "a nasal procedure with Dr. Manning this morning.  He was supposed to wake up at 6:30 in the morning and take a benzodiazepine and then again a few hours later and in preparation for this procedure.  Patient is also since his hospitalization in October of last year, had a sleep study and he has sleep apnea and is awaiting a device for this.  In the meantime, he is on oxygen at night.  No recent fever.  No trauma or falls.  Currently, the patient has no complaints of pain.  No headache, chest pain, abdominal pain or back pain.  No vomiting or diarrhea.    PAST MEDICAL HISTORY   has a past medical history of Apnea, sleep, Arrhythmia (2021), BPH associated with nocturia, Chickenpox, Frequent urination, [place] measles, Snoring, and Wears glasses.    SURGICAL HISTORY   has a past surgical history that includes appendectomy () and tonsillectomy.    FAMILY HISTORY  Family History   Problem Relation Age of Onset    Cancer Mother         br ca    Heart Disease Mother     Cancer Father     Cancer Brother         brain ca       SOCIAL HISTORY  Social History     Tobacco Use    Smoking status: Former     Types: Cigarettes     Start date: 1970     Quit date: 1979     Years since quittin.1    Smokeless tobacco: Never    Tobacco comments:     0.5 x 5 yrs, stopped at age 26   Vaping Use    Vaping Use: Never used   Substance and Sexual Activity    Alcohol use: Not Currently     Comment: occ    Drug use: Never    Sexual activity: Yes     Partners: Female     Comment:        CURRENT MEDICATIONS  Home Medications    **Home medications have not yet been reviewed for this encounter**         ALLERGIES  No Known Allergies    PHYSICAL EXAM  VITAL SIGNS: BP (!) 168/72   Pulse 69   Temp 36.3 °C (97.4 °F) (Temporal)   Resp 15   Ht 1.702 m (5' 7\")   Wt 93.4 kg (206 lb)   SpO2 95%   BMI 32.26 kg/m²    Vitals reviewed.  Constitutional: Patient is oriented to person, place, and time. Appears well-developed and " well-nourished. No distress.    Head: Normocephalic and atraumatic.   Ears: Normal external ears bilaterally.   Mouth/Throat: Oropharynx is clear and moist.  Eyes: Conjunctivae are normal. Pupils are equal, round and reactive.  Neck: Normal range of motion. Neck supple.   Cardiovascular: Normal rate, regular rhythm and normal heart sounds. Normal peripheral pulses.  Pulmonary/Chest: Effort normal and breath sounds normal. No respiratory distress, no wheezes, rhonchi, or rales. No chest wall tenderness.  Abdominal: Soft. Bowel sounds are normal. There is no tenderness, rebound or guarding, or peritoneal signs, no masses. No CVA tenderness.  Musculoskeletal: No edema and no tenderness.   Lymphadenopathy: No cervical adenopathy.   Neurological: Patient is alert and oriented to person, place, and time. No cranial nerve deficits. Normal motor and sensory exam. No focal deficits.   Skin: Skin is warm and dry. No erythema. No pallor.   Psychiatric: Patient has a normal mood and affect.       DIAGNOSTIC STUDIES / PROCEDURES  EKG  I have independently interpreted this EKG    LABS  Results for orders placed or performed during the hospital encounter of 02/06/23   CBC WITH DIFFERENTIAL   Result Value Ref Range    WBC 7.3 4.8 - 10.8 K/uL    RBC 4.55 (L) 4.70 - 6.10 M/uL    Hemoglobin 13.8 (L) 14.0 - 18.0 g/dL    Hematocrit 40.6 (L) 42.0 - 52.0 %    MCV 89.2 81.4 - 97.8 fL    MCH 30.3 27.0 - 33.0 pg    MCHC 34.0 33.7 - 35.3 g/dL    RDW 41.2 35.9 - 50.0 fL    Platelet Count 164 164 - 446 K/uL    MPV 11.2 9.0 - 12.9 fL    Neutrophils-Polys 63.90 44.00 - 72.00 %    Lymphocytes 23.70 22.00 - 41.00 %    Monocytes 7.40 0.00 - 13.40 %    Eosinophils 2.90 0.00 - 6.90 %    Basophils 1.10 0.00 - 1.80 %    Immature Granulocytes 1.00 (H) 0.00 - 0.90 %    Nucleated RBC 0.00 /100 WBC    Neutrophils (Absolute) 4.64 1.82 - 7.42 K/uL    Lymphs (Absolute) 1.72 1.00 - 4.80 K/uL    Monos (Absolute) 0.54 0.00 - 0.85 K/uL    Eos (Absolute) 0.21 0.00  - 0.51 K/uL    Baso (Absolute) 0.08 0.00 - 0.12 K/uL    Immature Granulocytes (abs) 0.07 0.00 - 0.11 K/uL    NRBC (Absolute) 0.00 K/uL   COMP METABOLIC PANEL   Result Value Ref Range    Sodium 142 135 - 145 mmol/L    Potassium 3.3 (L) 3.6 - 5.5 mmol/L    Chloride 110 96 - 112 mmol/L    Co2 19 (L) 20 - 33 mmol/L    Anion Gap 13.0 7.0 - 16.0    Glucose 162 (H) 65 - 99 mg/dL    Bun 23 (H) 8 - 22 mg/dL    Creatinine 0.99 0.50 - 1.40 mg/dL    Calcium 8.1 (L) 8.5 - 10.5 mg/dL    AST(SGOT) 16 12 - 45 U/L    ALT(SGPT) 17 2 - 50 U/L    Alkaline Phosphatase 112 (H) 30 - 99 U/L    Total Bilirubin 0.3 0.1 - 1.5 mg/dL    Albumin 3.6 3.2 - 4.9 g/dL    Total Protein 5.6 (L) 6.0 - 8.2 g/dL    Globulin 2.0 1.9 - 3.5 g/dL    A-G Ratio 1.8 g/dL   TROPONIN   Result Value Ref Range    Troponin T 11 6 - 19 ng/L   ESTIMATED GFR   Result Value Ref Range    GFR (CKD-EPI) 81 >60 mL/min/1.73 m 2   CORRECTED CALCIUM   Result Value Ref Range    Correct Calcium 8.4 (L) 8.5 - 10.5 mg/dL   EKG   Result Value Ref Range    Report       [facility] Emergency Dept.    Test Date:  2023  Pt Name:    PRABHA MONTANA                  Department: ER  MRN:        [id number]                      Room:       [facility]  Gender:     Male                         Technician: [id number]  :        1950                   Requested By:ER TRIAGE PROTOCOL  Order #:    [id number]                    Reading MD: ELIZABETH EMERY, DO    Measurements  Intervals                                Axis  Rate:       70                           P:          10  SD:         229                          QRS:        -11  QRSD:       82                           T:          83  QT:         397  QTc:        429    Interpretive Statements  Sinus rhythm  Prolonged SD interval  Probable left atrial enlargement  LVH with secondary repolarization abnormality  Inferior infarct, old  Compared to ECG 10/21/2022 03:12:14  Early repolarization now present  Q waves no longer  present  Myocardial infarct finding still present  Electronically Signed On 2 -6-2023 5:29:40 PST by ELIZABETH EMERY, DO           RADIOLOGY  I have independently interpreted the diagnostic imaging associated with this visit and am waiting the final reading from the radiologist.   My preliminary interpretation is a follows: Andrew film chest: Possible infiltrate, right lung.  Radiologist interpretation:   CT-HEAD W/O   Final Result         1.  No acute intracranial abnormality is identified, there are nonspecific white matter changes, commonly associated with small vessel ischemic disease.  Associated mild cerebral atrophy is noted.   2.  Atherosclerosis.         DX-CHEST-PORTABLE (1 VIEW)   Final Result         1.  Slight hazy right lung base density, could represent subtle infiltrate, consider aspiration given history.            COURSE & MEDICAL DECISION MAKING    ED Observation Status? No; Patient does not meet criteria for ED Observation.     INITIAL ASSESSMENT, COURSE AND PLAN  Care Narrative:     This is a pleasant and overall well-appearing 72-year-old male who presents with an episode prior to arrival where he was unresponsive.  There is a report of some muscle activity although it is unclear whether this was seizure or syncopal episode or possibly, hypoxic episode while sleeping.  Patient is awake and alert now.  He has no complaints of pain.  CPR was performed prior to arrival.  Patient currently has reassuring vital signs.  Wife notes that he had a similar episode in October of last year he was admitted and had an extensive work-up including EEG.  I myself have reviewed the patient's echocardiogram as well as his MRI and CT of the brain.    8:20 AM data reviewed.  Neurology paged.  Other than chronic findings, CT is unrevealing.  Chest x-ray shows slight hazy right lung density possibly subtle infiltrate.  White blood cell count is normal.  He remains afebrile.  There is no neutrophilic shift.  He has some  "electrolyte abnormalities including a low potassium of 3.3.  Bicarbonate is low, 19.  Glucose elevated 162.    0835AM patient is reevaluated at the bedside.  Family remains at the bedside.  There has been no seizure activity here in the ED.  Patient again, appears normal.  He has reassuring vital signs and he has no pain.  No neurologic deficits.  He feels \"normal\".  I discussed with him his CT findings, lab results, possible findings on chest x-ray of pneumonia.  At this point, await consultation with neurology.    8:44 AM D/W Neurology Dr. Ho, regarding patient's presentation, the similarities to the prior presentation and the work-up done at that time, including EEG, which was normal.  I have notified him of the work-up done here today including labs and CT head.  Also, tongue biting.  He is recommending starting the patient, to be on the safe side, Keppra 500 mg twice a day and follow-up with neurology.    1010AM patient's reevaluated the bedside.  I discussed with him, my conversation with neurology.  Wife is gone home and he is encouraged to let me know when she returns to I can explain care to both of them.    10:45 AM wife is at the bedside.  We spoke at length.  They both had multiple questions which I help them answer.  Again, recommendation from neurology at this time is to start on Keppra.  He is given a first dose here and will be discharged home with a prescription for this.  He is advised to follow-up with neurology and this information is in his discharge papers.  He notes that he has recently finished 2 courses of antibiotics prescribed by his ENT physician.  There was concern for possible pneumonia, infiltrate on chest x-ray though the patient is not febrile and has no elevated white blood cell count.  Is possible, this may be residual from prior formation or infection.  He is not experiencing cough or shortness of breath.  Clinically I do not suggest pneumonia and due to his recent multiple " courses of antibiotics, I have advised against it.  He will follow-up with ENT to have his nasal procedure rescheduled.  He will press on the company who is to provide the CPAP machine for his sleep apnea.  He is requesting nasal cannula tubing because he needs additional 1 at home and this was provided.  This point, I feel he can safely be discharged home both he and his wife are agreeable.    DISPOSITION AND DISCUSSIONS    Escalation of care considered, and ultimately not performed:acute inpatient care management, however at this time, the patient is most appropriate for outpatient management.  Given his recent thorough work-up including MRI of the brain, echocardiogram, EEG, CT imaging, I do not see indication for admit at this time.    Decision tools and prescription drugs considered including, but not limited to: Antibiotics not recommended at this time given her recent course completed just 2 days ago and multiple courses in the last several weeks and no clinical suspicion for pneumonia. .    FINAL DIAGNOSIS  1. Seizure-like activity (HCC)           Electronically signed by: Preeti Ramirez D.O., 2/6/2023 5:07 AM

## 2023-02-06 NOTE — DISCHARGE INSTRUCTIONS
After discussion with neurology, they advised to be started on the seizure medication.  This is Keppra, 500 mg to be taken twice daily.  Please see your discharge papers to follow-up with neurology as well.

## 2023-02-07 ENCOUNTER — OFFICE VISIT (OUTPATIENT)
Dept: MEDICAL GROUP | Facility: MEDICAL CENTER | Age: 73
End: 2023-02-07
Payer: MEDICARE

## 2023-02-07 VITALS
OXYGEN SATURATION: 94 % | SYSTOLIC BLOOD PRESSURE: 140 MMHG | WEIGHT: 201.72 LBS | BODY MASS INDEX: 31.66 KG/M2 | HEIGHT: 67 IN | TEMPERATURE: 98.2 F | DIASTOLIC BLOOD PRESSURE: 64 MMHG | HEART RATE: 67 BPM

## 2023-02-07 DIAGNOSIS — R56.9 SEIZURE-LIKE ACTIVITY (HCC): ICD-10-CM

## 2023-02-07 DIAGNOSIS — Z09 HOSPITAL DISCHARGE FOLLOW-UP: ICD-10-CM

## 2023-02-07 DIAGNOSIS — I34.0 MITRAL VALVE INSUFFICIENCY, UNSPECIFIED ETIOLOGY: ICD-10-CM

## 2023-02-07 DIAGNOSIS — I15.9 SECONDARY HYPERTENSION: ICD-10-CM

## 2023-02-07 PROBLEM — R03.0 ELEVATED BP WITHOUT DIAGNOSIS OF HYPERTENSION: Status: RESOLVED | Noted: 2020-12-09 | Resolved: 2023-02-07

## 2023-02-07 PROCEDURE — 99214 OFFICE O/P EST MOD 30 MIN: CPT | Performed by: NURSE PRACTITIONER

## 2023-02-07 ASSESSMENT — FIBROSIS 4 INDEX: FIB4 SCORE: 1.68

## 2023-02-07 ASSESSMENT — PATIENT HEALTH QUESTIONNAIRE - PHQ9: CLINICAL INTERPRETATION OF PHQ2 SCORE: 0

## 2023-02-08 NOTE — ASSESSMENT & PLAN NOTE
Admitted to [facility] on 2/6/2023 for suspected seizure and bitten tongue on the right side.  Patient does not recall the event.  Has been diagnosed with hypoxia and sleep apnea, on supplemental oxygen at night and awaiting equipment.  He was advised not to drive and concurs.  Started Keppra 500 mg twice daily.

## 2023-02-08 NOTE — ASSESSMENT & PLAN NOTE
Chronic problem.  Today at 140/64.  Denies any symptoms, no CP, dyspnea or dizziness.  On lisinopril 40 mg and amlodipine 10 mg and compliant.  Yesterday went to ED, states this might be the stress and the residual.  Advised to continue monitoring BP at home and bring to the next appointment.

## 2023-02-08 NOTE — PROGRESS NOTES
Chief Complaint   Patient presents with    Hospital Follow-up     Seizure       HISTORY OF PRESENT ILLNESS: Patient is a 72 y.o. male, established patient who presents today to discuss medical problems as listed below:    Health Maintenance:  COMPLETED       Allergies: Patient has no known allergies.    Current Outpatient Medications   Medication Sig Dispense Refill    levETIRAcetam (KEPPRA) 500 MG Tab Take 1 Tablet by mouth 2 times a day. 60 Tablet 0    atorvastatin (LIPITOR) 40 MG Tab TAKE 1 TABLET BY MOUTH EVERYDAY AT BEDTIME 90 Tablet 1    lisinopril (PRINIVIL) 40 MG tablet TAKE 1 TABLET BY MOUTH EVERY DAY 90 Tablet 1    amLODIPine (NORVASC) 10 MG Tab TAKE 1 TABLET BY MOUTH EVERY DAY 90 Tablet 0    tamsulosin (FLOMAX) 0.4 MG capsule Take 2 Capsules by mouth every day. 120 Capsule 0    Multiple Vitamins-Minerals (DAILY MULTI VITAMIN/MINERALS PO) Take 1 Tablet by mouth every day.      VITAMIN D PO Take 1 Tablet by mouth every day.      mupirocin (BACTROBAN) 2 % Ointment 1 application to affected area (Patient not taking: Reported on 2/7/2023)      omeprazole (PRILOSEC) 20 MG delayed-release capsule TAKE 1 CAPSULE BY MOUTH EVERY DAY 90 Capsule 1    ammonium lactate (LAC-HYDRIN) 12 % Lotion Apply 1 Application topically 3 times a day as needed (apply on heels). (Patient not taking: Reported on 2/7/2023) 500 g 1     No current facility-administered medications for this visit.       Allergies, past medical history, past surgical history, family history, social history reviewed and updated.    Review of Systems:     - Constitutional: Negative for fever, chills, unexpected weight change, and fatigue/generalized weakness.     - Respiratory: Negative for cough, sputum production, chest congestion, dyspnea, wheezing, and crackles.      - Cardiovascular: Negative for chest pain, palpitations, orthopnea, and bilateral lower extremity edema.      - Psychiatric/Behavioral: Negative for depression, suicidal/homicidal ideation  "and memory loss.      All other systems reviewed and are negative    Exam:    BP (!) 140/64 (BP Location: Left arm, Patient Position: Sitting, BP Cuff Size: Adult)   Pulse 67   Temp 36.8 °C (98.2 °F) (Temporal)   Ht 1.702 m (5' 7\")   Wt 91.5 kg (201 lb 11.5 oz)   SpO2 94%   BMI 31.59 kg/m²  Body mass index is 31.59 kg/m².    Physical Exam:  Constitutional: Well-developed and well-nourished. Not diaphoretic. No distress.   Cardiovascular: Regular rate and rhythm, S1 and S2 without murmur, rubs, or gallops.    Chest: Effort normal. Clear to auscultation throughout. No adventitious sounds. Neurological: Alert and oriented x 3.   Psychiatric:  Behavior, mood, and affect are appropriate.  MA/nursing note and vitals reviewed.    LABS: 2/2023  results reviewed and discussed with the patient, questions answered.    Assessment/Plan:  Hospital discharge follow-up  Patient was seen at [facility] for possible seizure activity. Labs mild dehydrati dehydration, otherw With electrolytes reviewed and washed that time sodiumise.  WNL.   Xray :  1.  Slight hazy right lung base density, could represent subtle infiltrate, consider aspiration given history.  Completed 2 rounds of abx from ENT. No fever, no SOB, no CP.  EEG WNL  The patient denies chest pain, shortness of breath or dyspnea on exertion.   Denies changes in vision, no dizziness.   He was advised not to drive.  He concurs    Seizure-like activity (HCC)  Admitted to [facility] on 2/6/2023 for suspected seizure and bitten tongue on the right side.  Patient does not recall the event.  Has been diagnosed with hypoxia and sleep apnea, on supplemental oxygen at night and awaiting equipment.  He was advised not to drive and concurs.  Started Keppra 500 mg twice daily.    HTN (hypertension)  Chronic problem.  Today at 140/64.  Denies any symptoms, no CP, dyspnea or dizziness.  On lisinopril 40 mg and amlodipine 10 mg and compliant.  Yesterday went to ED, states this " might be the stress and the residual.  Advised to continue monitoring BP at home and bring to the next appointment.    Mitral valve insufficiency  Chronic and stable.  Noted chronic murmur.  Following with [facility].    1. Seizure-like activity (HCC)  - Referral to Neurology    2. Hospital discharge follow-up  Records reviewed and discussed with patient.    3. Secondary hypertension  Patient to monitor BP at home if continues to stay above 140/90 return for reevaluation and management    4. Mitral valve insufficiency, unspecified etiology  Stable.  Followed by [facility]      Discussed with patient possible alternative diagnoses, patient is to take all medications as prescribed.      If symptoms persist FU w/PCP, if symptoms worsen go to emergency room.      If experiencing any side effects from prescribed medications report to the office immediately or go to emergency room.     Reviewed indication, dosage, usage and potential adverse effects of prescribed medications.      Reviewed risks and benefits of treatment plan. Patient verbalizes understanding of all instruction and verbally agrees to plan.     Discussed plan with the patient, and patient agrees to the above.      I personally reviewed prior external notes and test results pertinent to today's visit.      No follow-ups on file. 3 mos

## 2023-02-08 NOTE — ASSESSMENT & PLAN NOTE
Patient was seen at [facility] for possible seizure activity. Labs mild dehydrati dehydration, otherw With electrolytes reviewed and washed that time sodiumise.  WNL.   Xray :  1.  Slight hazy right lung base density, could represent subtle infiltrate, consider aspiration given history.  Completed 2 rounds of abx from ENT. No fever, no SOB, no CP.  EEG WNL  The patient denies chest pain, shortness of breath or dyspnea on exertion.   Denies changes in vision, no dizziness.   He was advised not to drive.  He concurs

## 2023-02-14 ENCOUNTER — TELEMEDICINE (OUTPATIENT)
Dept: MEDICAL GROUP | Facility: MEDICAL CENTER | Age: 73
End: 2023-02-14
Payer: MEDICARE

## 2023-02-14 DIAGNOSIS — R09.81 NASAL CONGESTION: ICD-10-CM

## 2023-02-14 DIAGNOSIS — S09.93XD INJURY OF TONGUE, SUBSEQUENT ENCOUNTER: ICD-10-CM

## 2023-02-14 DIAGNOSIS — R56.9 SEIZURE-LIKE ACTIVITY (HCC): ICD-10-CM

## 2023-02-14 PROBLEM — S09.93XA INJURY OF TONGUE: Status: ACTIVE | Noted: 2023-02-14

## 2023-02-14 PROCEDURE — 99214 OFFICE O/P EST MOD 30 MIN: CPT | Mod: 95 | Performed by: NURSE PRACTITIONER

## 2023-02-14 NOTE — ASSESSMENT & PLAN NOTE
Patient is presenting today for multiple questions.  He was seen at the hospital on 2/6/2023 for seizure-like activity and bitten tongue on the right side of his tongue.  This was likely triggered by hypoxic episode initially presented 10/21/2022.  Patient was evaluated in ED and since was diagnosed with severe sleep apnea.  He has been followed by pulmonology, NP Chica Archuleta.  He is awaiting his DME equipment for sleep apnea, just received a letter approving the equipment.  He is to follow-up with when the treatment is expected.  For now utilizing wedge pillow.  He admits to clogged nasal passages and during the night at times using nasal saline.  He is utilizing humidifier and air purifier at home.  Is also scheduled to see neurology on 3/16/2023.  He has not been driving since the hospital evaluation.  He was placed on Keppra 500 mg twice daily and compliant, no side effects.

## 2023-02-14 NOTE — ASSESSMENT & PLAN NOTE
Bitten tongue on the right hip since seizure-like activity determined at the hospital evaluation on 2/6/2023.  Now having small blister, no pain, no difficulty swallowing.

## 2023-02-14 NOTE — ASSESSMENT & PLAN NOTE
Chronic problem.  Not well controlled.  Utilizing Flonase and normal saline.  This issue gets worse at night when laying flat.  On 11/16/2022 the referral was placed and authorized to see Dr. Manning.

## 2023-02-15 NOTE — PROGRESS NOTES
Virtual Visit: Established Patient   This visit was conducted via Zoom using secure and encrypted videoconferencing technology.   The patient was in their home in the [facility].    The patient's identity was confirmed and verbal consent was obtained for this virtual visit.     Subjective:   CC: No chief complaint on file.      Ravindra Raines is a 72 y.o. male presenting for evaluation and management of:    Seizure-like activity (HCC)  Patient is presenting today for multiple questions.  He was seen at the hospital on 2/6/2023 for seizure-like activity and bitten tongue on the right side of his tongue.  This was likely triggered by hypoxic episode initially presented 10/21/2022.  Patient was evaluated in ED and since was diagnosed with severe sleep apnea.  He has been followed by pulmonology, NP Chica Archuleta.  He is awaiting his DME equipment for sleep apnea, just received a letter approving the equipment.  He is to follow-up with when the treatment is expected.  For now utilizing wedge pillow.  He admits to clogged nasal passages and during the night at times using nasal saline.  He is utilizing humidifier and air purifier at home.  Is also scheduled to see neurology on 3/16/2023.  He has not been driving since the hospital evaluation.  He was placed on Keppra 500 mg twice daily and compliant, no side effects.    Injury of tongue  Bitten tongue on the right hip since seizure-like activity determined at the hospital evaluation on 2/6/2023.  Now having small blister, no pain, no difficulty swallowing.    Nasal congestion  Chronic problem.  Not well controlled.  Utilizing Flonase and normal saline.  This issue gets worse at night when laying flat.  On 11/16/2022 the referral was placed and authorized to see Dr. Manning.     ROS     Current medicines (including changes today)  Current Outpatient Medications   Medication Sig Dispense Refill    mupirocin (BACTROBAN) 2 % Ointment 1 application to affected area  (Patient not taking: Reported on 2/7/2023)      levETIRAcetam (KEPPRA) 500 MG Tab Take 1 Tablet by mouth 2 times a day. 60 Tablet 0    atorvastatin (LIPITOR) 40 MG Tab TAKE 1 TABLET BY MOUTH EVERYDAY AT BEDTIME 90 Tablet 1    lisinopril (PRINIVIL) 40 MG tablet TAKE 1 TABLET BY MOUTH EVERY DAY 90 Tablet 1    amLODIPine (NORVASC) 10 MG Tab TAKE 1 TABLET BY MOUTH EVERY DAY 90 Tablet 0    omeprazole (PRILOSEC) 20 MG delayed-release capsule TAKE 1 CAPSULE BY MOUTH EVERY DAY 90 Capsule 1    ammonium lactate (LAC-HYDRIN) 12 % Lotion Apply 1 Application topically 3 times a day as needed (apply on heels). (Patient not taking: Reported on 2/7/2023) 500 g 1    tamsulosin (FLOMAX) 0.4 MG capsule Take 2 Capsules by mouth every day. 120 Capsule 0    Multiple Vitamins-Minerals (DAILY MULTI VITAMIN/MINERALS PO) Take 1 Tablet by mouth every day.      VITAMIN D PO Take 1 Tablet by mouth every day.       No current facility-administered medications for this visit.       Patient Active Problem List    Diagnosis Date Noted    Injury of tongue 02/14/2023    Nasal congestion 12/19/2022    Hypertrophy of nasal turbinates 12/19/2022    Chronic pansinusitis 12/19/2022    Chronic right-sided thoracic back pain 12/07/2022    Hospital discharge follow-up 10/26/2022    Sinus congestion 10/26/2022    Gastroesophageal reflux disease without esophagitis 10/26/2022    Benign prostatic hyperplasia with urinary frequency 10/26/2022    Acute metabolic encephalopathy 10/21/2022    Seizure-like activity (HCC) 10/21/2022    Aortic calcification (HCC) 10/20/2022    Syncopal episodes 07/01/2022    Callus of heel 11/19/2021    Snoring 02/12/2021    Mitral valve insufficiency 01/08/2021    Cardiac murmur 12/09/2020    Elevated hemoglobin A1c 11/13/2019    Seasonal allergic rhinitis 11/13/2019    BPH associated with nocturia 10/24/2019    Sacral back pain 10/24/2019    Hyperlipidemia 10/24/2019    HTN (hypertension) 10/24/2019    Vitamin D deficiency  10/24/2019        Objective:   There were no vitals taken for this visit.    Physical Exam:  Constitutional: Alert, no distress, well-groomed.  Skin: No rashes in visible areas.  Eye: Round. Conjunctiva clear, lids normal. No icterus.   ENMT: Lips pink without lesions, good dentition, moist mucous membranes. Phonation normal.  Neck: No masses, no thyromegaly. Moves freely without pain.  Respiratory: Unlabored respiratory effort, no cough or audible wheeze  Psych: Alert and oriented x3, normal affect and mood.     Assessment and Plan:   The following treatment plan was discussed:   1. Seizure-like activity (HCC)  Stable.  Continue Keppra.  Has an appointment in March with neurology.  Patient to follow-up with neurology.    2. Injury of tongue, subsequent encounter  Uncontrolled, stable.  And healing status.  Avoid biting or chewing hard foods.  Try to consume soft liquid foods to avoid accidental biting.  Return if symptoms do not improve or get worse.    3. Nasal congestion  Patient to follow-up with ENT, referral was placed back in November.       Discussed with patient possible alternative diagnoses, patient is to take all medications as prescribed.      If symptoms persist FU w/PCP, if symptoms worsen go to emergency room.      If experiencing any side effects from prescribed medications report to the office immediately or go to emergency room.     Reviewed indication, dosage, usage and potential adverse effects of prescribed medications.      Reviewed risks and benefits of treatment plan. Patient verbalizes understanding of all instruction and verbally agrees to plan.     Discussed plan with the patient, and patient agrees to the above.      I personally reviewed prior external notes and test results pertinent to today's visit.     Follow-up:

## 2023-02-27 ENCOUNTER — OFFICE VISIT (OUTPATIENT)
Dept: NEUROLOGY | Facility: MEDICAL CENTER | Age: 73
End: 2023-02-27
Attending: PSYCHIATRY & NEUROLOGY
Payer: MEDICARE

## 2023-02-27 VITALS
HEART RATE: 71 BPM | OXYGEN SATURATION: 95 % | SYSTOLIC BLOOD PRESSURE: 124 MMHG | WEIGHT: 204.8 LBS | DIASTOLIC BLOOD PRESSURE: 72 MMHG | HEIGHT: 67 IN | RESPIRATION RATE: 14 BRPM | TEMPERATURE: 97.2 F | BODY MASS INDEX: 32.15 KG/M2

## 2023-02-27 DIAGNOSIS — R56.9 SEIZURE-LIKE ACTIVITY (HCC): ICD-10-CM

## 2023-02-27 PROCEDURE — 99204 OFFICE O/P NEW MOD 45 MIN: CPT | Performed by: PSYCHIATRY & NEUROLOGY

## 2023-02-27 RX ORDER — LEVETIRACETAM 500 MG/1
500 TABLET ORAL 2 TIMES DAILY
Qty: 180 TABLET | Refills: 1 | Status: SHIPPED | OUTPATIENT
Start: 2023-02-27 | End: 2023-06-01 | Stop reason: SDUPTHER

## 2023-02-27 ASSESSMENT — FIBROSIS 4 INDEX: FIB4 SCORE: 1.68

## 2023-02-27 NOTE — PROGRESS NOTES
NEUROLOGY OUTPATIENT CLINIC VISIT NOTE        Chief Complaint:   Seizure 10/2022 and Feb 2023      HISTORY OF PRESENTING COMPLAINT:   is a 72 year old right handed, gentleman coming to neurology clinic for the first time for his episodes in October 2022 in February 2023.  He was accompanied by his wife for today's clinic visit.    The first episode of concern was on October 21, 2022 around 2 AM in the morning and patient's wife was woken up to more than usual loud snoring, followed bi a heavy sigh, and she had difficulty waking him. She also mentioned that his bilateral upper extremities were semiflexed and close to his body with small shaking movements.  There was no tongue biting or incontinence with this episode.  Patient does not recall any details other than waking up in the emergency room.  EMS transported him to the ER and found to be hypoxic on room air.  There was concern for sleep apnea as there was desaturation to the 70's noted and patient was discharged with night oxygen therapy till he had a formal sleep study completed. He had routine EEG study and an MRI of the brain completed at that time which were both within normal limits.  No leucocytosis on presentation and urine drug screen was negative.  CMP showed a glucose of 200.  He was discharged home and had a formal sleep study completed in 11/2022, and diagnosed as severe obstructive sleep apnea and central apnea accounting for 45% of respiratory events.  There was nocturnal hypoxia and periodic limb movements noted.    A CPAP machine was ordered and he started using in from 02/20/2023.  However on February 6, 2023 around 3 AM patient remembers waking up to use the restroom and using nasal spray for obstructed nasal passages. He may have fallen back asleep without putting the nasal cannula back on and his wife was woken up around 3:30 AM again to loud yell and some minimal shaking lasting less  than a minute. He was on his side and there was tongue biting associated with this episode and wife was able to lower him to the floor with the help of her grandson who lives with them.  Patient returned to baseline when the EMT's came to the house.  He was taken to hospital and started on levetiracetam 500 mg twice daily.     He denied any previous episodes of changes in awareness or loss of consciousness.  The wife denied any other nocturnal events other than history of snoring.  He denied any lightheadedness palpitation or focal weakness in upper or lower extremities.  No other episodes of confusion. Patient has not been driving since . He has been using the CPAP machine and is scheduled to see his sleep specialist soon.    Epilepsy Risk Factors:  No known risk factors, including  insults, developmental delay, febrile seizures, CNS infections/strokes, head trauma, or family history of epilepsy    Current AED regimen: Levetiracetam 500 mg BID     Previous Investigations:  EEG: 10/2022: This is a normal video EEG recording in the awake, drowsy and sleep states  MRI Brain: 10/2022: No acute abnormality. Mild chronic microvascular ischemic disease. Mild cerebral volume loss    CURRENT MEDICATIONS AT THE TIME OF THIS ENCOUNTER:    Current Outpatient Medications:     levETIRAcetam, 500 mg, Oral, BID, Taking    lisinopril, 40 mg, Oral, DAILY, Taking    amLODIPine, 10 mg, Oral, DAILY, Taking    tamsulosin, 0.8 mg, Oral, DAILY, Taking    Multiple Vitamins-Minerals (DAILY MULTI VITAMIN/MINERALS PO), 1 Tablet, Oral, DAILY, Taking    VITAMIN D PO, 1 Tablet, Oral, DAILY, Taking    mupirocin, 1 application to affected area (Patient not taking: Reported on 2023), Not Taking    atorvastatin, TAKE 1 TABLET BY MOUTH EVERYDAY AT BEDTIME    omeprazole, 20 mg, Oral, DAILY    ammonium lactate, 1 Application, Topical, TID PRN (Patient not taking: Reported on 2023), Not Taking     PAST MEDICAL  HISTORY:  Past Medical History:   Diagnosis    Complex sleep apnea    Heart murmur     BPH associated with nocturia    Hyperlipidemia     Hypertension        FAMILY HISTORY:  Family History   Problem Relation Age of Onset    Cancer Mother         br ca    Heart Disease Mother     Cancer Father     Cancer Brother         brain ca        SOCIAL HISTORY:     retired:   Social History     Socioeconomic History    Marital status:      Spouse name: Not on file    Number of children: Not on file    Years of education: Not on file    Highest education level: Bachelor's degree (e.g., BA, AB, BS)   Occupational History    Occupation: retired   Tobacco Use    Smoking status: Former     Types: Cigarettes     Start date: 1970     Quit date: 1979     Years since quittin.1    Smokeless tobacco: Never    Tobacco comments:     0.5 x 5 yrs, stopped at age 26   Vaping Use    Vaping Use: Never used   Substance and Sexual Activity    Alcohol use: Not Currently     Comment: occ    Drug use: Never    Sexual activity: Yes     Partners: Female     Comment:    Other Topics Concern    Not on file   Social History Narrative    Not on file     Social Determinants of Health     Financial Resource Strain: Low Risk     Difficulty of Paying Living Expenses: Not hard at all   Food Insecurity: No Food Insecurity    Worried About Running Out of Food in the Last Year: Never true    Ran Out of Food in the Last Year: Never true   Transportation Needs: No Transportation Needs    Lack of Transportation (Medical): No    Lack of Transportation (Non-Medical): No   Physical Activity: Insufficiently Active    Days of Exercise per Week: 2 days    Minutes of Exercise per Session: 30 min   Stress: No Stress Concern Present    Feeling of Stress : Only a little   Social Connections: Socially Integrated    Frequency of Communication with Friends and Family: Twice a week    Frequency of Social Gatherings with Friends and  "Family: Once a week    Attends Muslim Services: More than 4 times per year    Active Member of Clubs or Organizations: Yes    Attends Club or Organization Meetings: 1 to 4 times per year    Marital Status:    Intimate Partner Violence: Not on file   Housing Stability: Unknown    Unable to Pay for Housing in the Last Year: Patient refused    Number of Places Lived in the Last Year: 1    Unstable Housing in the Last Year: No          Review of systems:      General: Denies fevers or chills, or night sweats, or generalized fatigue.    Head: Denies headaches or dizziness or lightheadedness  Dermatologic:  Denies new rash  Musculoskeletal: Denies muscle pain or swelling, no atrophy, no neck and back pain or stiffness.   Neurologic: Denies facial droopiness, muscle weakness (focal or generalized), paresthesias, ataxia, change in speech or language, memory loss  Psychiatric: Denies anxiety, depression, mood swings, suicidal or homicidal thoughts    EXAM:   Ambulatory Vitals:  /72 (BP Location: Left arm, Patient Position: Sitting, BP Cuff Size: Adult)   Pulse 71   Temp 36.2 °C (97.2 °F) (Temporal)   Resp 14   Ht 1.702 m (5' 7\")   Wt 92.9 kg (204 lb 12.8 oz)   SpO2 95%        Physical Exam:   Neurological Exam:  Higher Mental Function:   Awake, alert and oriented to place, person and time  Able to answer questions appropriately and follow commands well  Memory intact to immediate recall and remote events  Speech is clear and language fluent   Mood: affect appears normal    Cranial Nerves:  CN II: Pupils equal and reactive, no visual field deficits on confrontation  CN III,IV, VI : EOM intact with no nystagmus  CN V: Facial sensation intact  CN VII: No facial asymmetry  CN VIII: Intact hearing to conversation  CN IX, X: palate elevates symmetrically   CN XI: Symmetric shoulder shrug  CN XII: tongue midline. No signs of tongue biting or fasciculations    Motor: 5/5  strength in bilateral upper and lower " extremities, No tremor at rest or on outstretched hands. Tone normal and no fasciculations  Sensory: Intact to light touch, temperature, and vibration in all 4 extremities  Reflexes: 2+ and symmetric in all 4 extremities  Coordination: Intact to finger to nose in both upper extremities, no truncal or appendicular ataxia  Gait: Normal gait, without the use of any assistance. No difficulty getting up from the chair       General:   Patient in no acute distress, pleasant and cooperative.  HEENT: Normocephalic, no signs of acute trauma.   Neck: Supple. There is normal range of motion.   Psychiatric: No hallucinatory behavior. No symptoms of depression or suicidal ideation. Mood and affect appear normal on exam.       ASSESSMENT AND PLAN:  1.Seizure:    is a 72-year-old right-handed gentleman coming to neurology clinic for the first time for his 2 distinct nocturnal episodes , from sleep in October 2022 and in February 2023, with description most suggestive of nocturnal convulsions, with confusion following the episode. The clinical history raises concern for an epilepsy syndrome, even though there is a recent diagnosis of sleep apnea, which patient most likely had for years. He denied any previous episodes prior to 10/2022 and no clear trigger factors. Patient has severe complex sleep apnea recently diagnosed and only started on CPAP machine on February 20, 2023. MRI Brain and EEG studies completed in October 2022 after the initial episode were both within normal limits.  He was started on levetiracetam following his second episode, which I recommended he continue to reduce his risk for recurrent seizures. If his sleep apnea was likely a provoking factor and this is well controlled, and no other episodes to suggest an underlying epilepsy syndrome, he may not need long-term use of antiepileptic medication.  Plan would be to consider a long-term ambulatory EEG study after 6 months, before we consider weaning him  off levetiracetam.  If he has any additional episodes of concern he may need higher doses of levetiracetam. He is tolerating the current dose without any side effects.    - Continue AED at current doses: Levetiracetam 500 mg BID. 90 day script sent to pharmacy    - Discussed side effects of medications and drug interactions       - Discussed avoidance of spell/sz triggers: alcohol, sleep deprivation, energy drinks, benadryl and stress.      - Discussed driving restrictions. No driving for 3 months per [place] state rule. I will fill his DMV form in May 2023, as long as he does not have additional episodes.     2. Sleep apnea:   Patient was recently diagnosed with Complex sleep apnea with both obstructive and central component and started on CPAP  I recommended close follow-up with his sleep specialist for any device adjustments.  Hypoxia can lower his seizure threshold however 2 distinct episodes at night, raises possibility of an underlying epilepsy syndrome.    Total time of the visit was 54 minutes including time spent in pre charting, review of the previous history and test results, documentation, discussing medication safety, side effects, sending refills, discussing plan of care and answering patient's questions .He will follow up with his primary care physician for other medical co morbidities.      EDUCATION, COUNSELING:    - Education was provided to the patient and/or family regarding diagnosis and prognosis. The chronic and unpredictable nature of the condition were discussed. There is increased risk for additional events, which may carry potential for significant injuries and death. Discussed frequent seizure triggers: sleep deprivation, medication non-compliance, use of illegal drugs/alcohol, stress, and others.     - Reviewed in detail the current antiepileptic regimen. Potential side effects of antiepileptics were discussed at length, including but no limited to: hypersensitivity reactions (rash and  others, some of which can be fatal), visual field changes (some of which may be irreversible), glaucoma, diplopia, kidney stones, osteopenia/osteoporosis/ bone fractures, hyperthermia/anhydrosis, hyponatremia, tremors/abnormal movements, ataxia, dizziness, fatigue, increased risk for falls, risk for cardiac arrhythmias and syncope, weight changes, hair loss, gastrointestinal side effects(hepatitis, pancreatitis, gastritis, ulcers, gingival hypertrophy/bleeding, drowsiness, sedation, memory loss, trouble finding words, anxiety/nervousness, increased risk for suicide, increased risk for depression, and psychosis.     - Reviewed drug-drug interactions and their potential effect on seizure control and medication side effects.      -Recommend chronic vitamin D supplementation and regular exercise (if not contraindicated).     -Patient/family educated on risk for SUDEP (Sudden Death in Epilepsy). Counseling was provided on the importance of strict medication and follow up compliance. The patient/family understand the risks associated with non-adherence with the medical plan as outlined, including but not limited to an increased risk for breakthrough seizures, which may contribute to injuries, disability, status epilepticus, and even death.   -Counseling was also provided on potential effects of alcohol and other drugs, which may lower seizure threshold and/or affect the metabolism of antiepileptic drugs. We recommend avoidance of alcohol and illegal drugs and avoid sleep deprivation.     - Extensively discussed the aspects related to safety in drivers who suffer from epilepsy. The patient is encourage to report to the Division of Motor Vehicles of any condition and/or spells related to confusion, disorientation, and/or loss of awareness and/or loss of consciousness; as these may pose a safety issue if they occur while operating a motor vehicle. The patient and/or family are ultimately responsible for exercising caution  and abiding to regulations in place. The patient understands that only the Department of Motor Vehicles (DMV) is able to authorize an individual to drive, even after medical clearance, DMV clearance must be obtained.     -Other seizure precautions were discussed at length, including no diving, no skydiving, no climbing or exposure to unprotected heights, no unsupervised swimming, no Jacuzzi or bathing in bathtubs or deep bodies of water. The patient/family have been advised about risks for operating any machinery while suffering from seizures / syncope / epilepsy and/or while taking antiepileptic drugs.     -The patient understands and agrees that due to the complexity of his/her diagnosis, results of any testing and further recommendations will typically be discussed/made during a face to face encounter in office. The patient and/or family further understands it is their responsibility to keep proper follow up.      Patient/family agree with plan, as outlined.       Kwasi Delgado MD, MHS  [facility]

## 2023-03-08 DIAGNOSIS — R35.1 BPH ASSOCIATED WITH NOCTURIA: ICD-10-CM

## 2023-03-08 DIAGNOSIS — N40.1 BPH ASSOCIATED WITH NOCTURIA: ICD-10-CM

## 2023-03-08 RX ORDER — TAMSULOSIN HYDROCHLORIDE 0.4 MG/1
0.8 CAPSULE ORAL DAILY
Qty: 120 CAPSULE | Refills: 0 | Status: SHIPPED | OUTPATIENT
Start: 2023-03-08 | End: 2023-03-22

## 2023-03-27 ENCOUNTER — TELEPHONE (OUTPATIENT)
Dept: NEUROLOGY | Facility: MEDICAL CENTER | Age: 73
End: 2023-03-27
Payer: MEDICARE

## 2023-03-27 NOTE — TELEPHONE ENCOUNTER
Returning a voicemail from patient having question on his DMV paperwork. Advised patient that he is okay to bring paperwork to his appointment 04/18/23.

## 2023-04-01 DIAGNOSIS — I15.9 SECONDARY HYPERTENSION: ICD-10-CM

## 2023-04-04 RX ORDER — AMLODIPINE BESYLATE 10 MG/1
10 TABLET ORAL DAILY
Qty: 90 TABLET | Refills: 0 | Status: SHIPPED | OUTPATIENT
Start: 2023-04-04 | End: 2023-05-11

## 2023-04-11 ENCOUNTER — TELEPHONE (OUTPATIENT)
Dept: NEUROLOGY | Facility: MEDICAL CENTER | Age: 73
End: 2023-04-11
Payer: MEDICARE

## 2023-04-18 ENCOUNTER — OFFICE VISIT (OUTPATIENT)
Dept: NEUROLOGY | Facility: MEDICAL CENTER | Age: 73
End: 2023-04-18
Attending: PSYCHIATRY & NEUROLOGY
Payer: MEDICARE

## 2023-04-18 VITALS
BODY MASS INDEX: 33.15 KG/M2 | HEART RATE: 60 BPM | OXYGEN SATURATION: 98 % | SYSTOLIC BLOOD PRESSURE: 126 MMHG | TEMPERATURE: 97.9 F | WEIGHT: 211.64 LBS | DIASTOLIC BLOOD PRESSURE: 80 MMHG

## 2023-04-18 DIAGNOSIS — G47.30 SLEEP APNEA, UNSPECIFIED TYPE: ICD-10-CM

## 2023-04-18 DIAGNOSIS — R56.9 SEIZURE (HCC): ICD-10-CM

## 2023-04-18 PROCEDURE — 99215 OFFICE O/P EST HI 40 MIN: CPT | Performed by: PSYCHIATRY & NEUROLOGY

## 2023-04-18 PROCEDURE — 99212 OFFICE O/P EST SF 10 MIN: CPT | Performed by: PSYCHIATRY & NEUROLOGY

## 2023-04-18 ASSESSMENT — FIBROSIS 4 INDEX: FIB4 SCORE: 1.7

## 2023-04-18 NOTE — PROGRESS NOTES
NEUROLOGY FOLLOW UP VISIT NOTE       Chief Complaint: History of seizure       INTERVAL HISTORY FROM PREVIOUS CLINIC VISIT :    is a 73 year old right handed, gentleman coming to neurology clinic for follow up of his episodes in 2022 and 2023.  He was accompanied by his wife for today's clinic visit. He was previously seen in neurology clinic on 2023 and details of his complaints were documented in my initial consult note from 2023.    Since the last visit with me, he denied any episodes of concern.  He is tolerating his levetiracetam 500 mg twice daily without any side effects.  He also mentioned that his sleep numbers which she has been following very closely have been very good recently.  He continues to follow-up with his sleep physician.    He has not been driving and gave me DMV paperwork today so he can resume driving from May 7 2023, which would be 90 days from his last episode.    He denied any previous episodes of changes in awareness or loss of consciousness.  The wife denied any other nocturnal events other than history of snoring.  He denied any lightheadedness palpitation or focal weakness in upper or lower extremities.  No other episodes of confusion. Patient has not been driving since October episode. He has been using the CPAP machine and is scheduled to see his sleep specialist soon.     Epilepsy Risk Factors:  No known risk factors, including  insults, developmental delay, febrile seizures, CNS infections/strokes, head trauma, or family history of epilepsy     Current AED regimen: Levetiracetam 500 mg BID     REVIEW OF HISTORY OF PRESENTING COMPLAINT:  In summary, the first episode of concern was on 2022 around 2 AM in the morning and patient's wife was woken up to more than usual loud snoring, followed by i a heavy sigh, and she had difficulty waking him. She also mentioned  that his bilateral upper extremities were semiflexed and close to his body with small shaking movements.  There was no tongue biting or incontinence with this episode.  Patient does not recall any details other than waking up in the emergency room.  EMS transported him to the ER and found to be hypoxic on room air.  There was concern for sleep apnea as there was desaturation to the 70's noted and patient was discharged with night oxygen therapy till he had a formal sleep study completed. He had routine EEG study and an MRI of the brain completed at that time which were both within normal limits.  No leucocytosis on presentation and urine drug screen was negative.  CMP showed a glucose of 200.  He was discharged home and had a formal sleep study completed in 11/2022, and diagnosed as severe obstructive sleep apnea and central apnea accounting for 45% of respiratory events.  There was nocturnal hypoxia and periodic limb movements noted.     A CPAP machine was ordered and he started using in from 02/20/2023.  However on February 6, 2023 around 3 AM patient remembers waking up to use the restroom and using nasal spray for obstructed nasal passages. He may have fallen back asleep without putting the nasal cannula back on and his wife was woken up around 3:30 AM again to loud yell and some minimal shaking lasting less than a minute. He was on his side and there was tongue biting associated with this episode and wife was able to lower him to the floor with the help of her grandson who lives with them.  Patient returned to baseline when the EMT's came to the house.  He was taken to hospital and started on levetiracetam 500 mg twice daily.      Previous Investigations:  EEG: 10/2022: This is a normal video EEG recording in the awake, drowsy and sleep states    MRI Brain: 10/2022: No acute abnormality. Mild chronic microvascular ischemic disease. Mild cerebral volume loss       CURRENT MEDICATIONS AT THE TIME OF THIS  ENCOUNTER:    Current Outpatient Medications:     amLODIPine, 10 mg, Oral, DAILY, Taking    terbinafine, TAKE 1 TABLET BY MOUTH EVERY DAY FOR NAIL FUNGUS, Taking    tamsulosin, 0.8 mg, Oral, DAILY, Taking    levETIRAcetam, 500 mg, Oral, BID, Taking    atorvastatin, TAKE 1 TABLET BY MOUTH EVERYDAY AT BEDTIME, Taking    lisinopril, 40 mg, Oral, DAILY, Taking    Multiple Vitamins-Minerals (DAILY MULTI VITAMIN/MINERALS PO), 1 Tablet, Oral, DAILY, Taking    VITAMIN D PO, 1 Tablet, Oral, DAILY, Taking    omeprazole, 20 mg, Oral, DAILY    ammonium lactate, 1 Application., Topical, TID PRN (Patient not taking: Reported on 2023), Not Taking     PAST MEDICAL HISTORY:      Past Medical History:   Diagnosis    Complex sleep apnea    Heart murmur     BPH associated with nocturia     Hyperlipidemia     Seizure 10/2022 and 2023    Hypertension        PAST SURGICAL HISTORY:  Past Surgical History:   Procedure Laterality Date    APPENDECTOMY      TONSILLECTOMY          FAMILY HISTORY:  Family History   Problem Relation Age of Onset    Cancer Mother         br ca    Heart Disease Mother     Cancer Father     Cancer Brother         brain ca        SOCIAL HISTORY:  Social History     Tobacco Use    Smoking status: Former     Types: Cigarettes     Start date: 1970     Quit date: 1979     Years since quittin.3    Smokeless tobacco: Never    Tobacco comments:     0.5 x 5 yrs, stopped at age 26   Vaping Use    Vaping Use: Never used   Substance Use Topics    Alcohol use: Not Currently     Comment: occ    Drug use: Never          Review of systems:      All other systems are reviewed and negative other than the ones mentioned in HPI.     EXAM:   Ambulatory Vitals:  /80 (BP Location: Left arm, Patient Position: Sitting, BP Cuff Size: Adult)   Pulse 60   Temp 36.6 °C (97.9 °F) (Temporal)   Wt 96 kg (211 lb 10.3 oz)   SpO2 98%        Physical Exam:     Neurological Exam:  Higher Mental Function:   Awake,  alert and oriented to place, person and time  Able to answer questions appropriately and follow commands well  Memory intact to immediate recall and remote events  Speech is clear and language fluent   Mood: affect appears normal     Cranial Nerves:  CN II: Pupils equal and reactive, no visual field deficits on confrontation  CN III,IV, VI : EOM intact with no nystagmus  CN V: Facial sensation intact  CN VII: No facial asymmetry  CN VIII: Intact hearing to conversation  CN IX, X: palate elevates symmetrically   CN XI: Symmetric shoulder shrug  CN XII: tongue midline. No signs of tongue biting or fasciculations     Motor: 5/5  strength in bilateral upper and lower extremities, No tremor at rest or on outstretched hands. Tone normal and no fasciculations  Sensory: Intact to light touch, temperature, and vibration in all 4 extremities  Reflexes: 2+ and symmetric in all 4 extremities  Coordination: Intact to finger to nose in both upper extremities, no truncal or appendicular ataxia  Gait: Normal gait, without the use of any assistance. No difficulty getting up from the chair         General:   Patient in no acute distress, pleasant and cooperative.  HEENT: Normocephalic, no signs of acute trauma.   Neck: Supple. There is normal range of motion.         ASSESSMENT AND PLAN:  1.Seizure:    is a 73-year-old right-handed gentleman coming to neurology clinic for his 2 distinct nocturnal episodes , from sleep in October 2022 and in February 2023, with description most suggestive of nocturnal convulsions, and confusion following the episodes. The clinical history raises concern for an underlying epilepsy syndrome, even though there is a recent diagnosis of sleep apnea, which patient most likely had for years. He denied any previous episodes prior to 10/2022 and no clear trigger factors. Patient has severe complex sleep apnea recently diagnosed and started on CPAP machine on February 20, 2023. MRI Brain and EEG studies  completed in October 2022 after the initial episode were both within normal limits.  He was started on levetiracetam following his second episode, which I recommended he continues to reduce his risk for recurrent seizures. If his sleep apnea was likely a provoking factor and this is well controlled, and no other episodes to suggest an underlying epilepsy syndrome, he may not need long-term use of antiepileptic medication.  Our plan would be to consider a long-term ambulatory EEG study after 6 months, before we consider weaning him off levetiracetam.  If he has any additional episodes of concern he may need higher doses of levetiracetam. He is tolerating the current dose without any side effects.     - Continue AED at current doses: Levetiracetam 500 mg BID     - Discussed side effects of medications and drug interactions       - Discussed avoidance of spell/sz triggers: alcohol, sleep deprivation, energy drinks, benadryl and stress.      - Discussed driving restrictions. No driving for 3 months following a seizure or episode of unexplained loss of consciousness per [place] state rule. I will fill his DMV form.      2. Sleep apnea:   Patient was recently diagnosed with complex sleep apnea with both obstructive and central component and started on CPAP  I recommended close follow-up with his sleep specialist for any device adjustments.  Hypoxia can lower his seizure threshold however 2 distinct episodes at night, raises possibility of an underlying epilepsy syndrome.     Total time of the visit was 45 minutes including time spent in pre charting, review of the previous history and test results, documentation, discussing medication safety, side effects, discussing plan of care and answering patient's questions .He will follow up with his primary care physician for other medical co morbidities. He will follow up with me in 4 months. If there are additional episodes of concern, they will inform me sooner.         EDUCATION,  COUNSELING:     - Education was provided to the patient and/or family regarding diagnosis and prognosis. The chronic and unpredictable nature of the condition were discussed. There is increased risk for additional events, which may carry potential for significant injuries and death. Discussed frequent seizure triggers: sleep deprivation, medication non-compliance, use of illegal drugs/alcohol, stress, and others.      - Reviewed in detail the current antiepileptic regimen. Potential side effects of antiepileptics were discussed at length, including but no limited to: hypersensitivity reactions (rash and others, some of which can be fatal), visual field changes (some of which may be irreversible), glaucoma, diplopia, kidney stones, osteopenia/osteoporosis/ bone fractures, hyperthermia/anhydrosis, hyponatremia, tremors/abnormal movements, ataxia, dizziness, fatigue, increased risk for falls, risk for cardiac arrhythmias and syncope, weight changes, hair loss, gastrointestinal side effects(hepatitis, pancreatitis, gastritis, ulcers, gingival hypertrophy/bleeding, drowsiness, sedation, memory loss, trouble finding words, anxiety/nervousness, increased risk for suicide, increased risk for depression, and psychosis.      - Reviewed drug-drug interactions and their potential effect on seizure control and medication side effects.       -Recommend chronic vitamin D supplementation and regular exercise (if not contraindicated).      -Patient/family educated on risk for SUDEP (Sudden Death in Epilepsy). Counseling was provided on the importance of strict medication and follow up compliance. The patient/family understand the risks associated with non-adherence with the medical plan as outlined, including but not limited to an increased risk for breakthrough seizures, which may contribute to injuries, disability, status epilepticus, and even death.   -Counseling was also provided on potential effects of alcohol and other drugs,  which may lower seizure threshold and/or affect the metabolism of antiepileptic drugs. We recommend avoidance of alcohol and illegal drugs and avoid sleep deprivation.      - Extensively discussed the aspects related to safety in drivers who suffer from epilepsy. The patient is encourage to report to the Division of Motor Vehicles of any condition and/or spells related to confusion, disorientation, and/or loss of awareness and/or loss of consciousness; as these may pose a safety issue if they occur while operating a motor vehicle. The patient and/or family are ultimately responsible for exercising caution and abiding to regulations in place. The patient understands that only the Department of [place] Vehicles (DMV) is able to authorize an individual to drive, even after medical clearance, DMV clearance must be obtained.      -Other seizure precautions were discussed at length, including no diving, no skydiving, no climbing or exposure to unprotected heights, no unsupervised swimming, no Jacuzzi or bathing in bathtubs or deep bodies of water. The patient/family have been advised about risks for operating any machinery while suffering from seizures / syncope / epilepsy and/or while taking antiepileptic drugs.      -The patient understands and agrees that due to the complexity of his/her diagnosis, results of any testing and further recommendations will typically be discussed/made during a face to face encounter in office. The patient and/or family further understands it is their responsibility to keep proper follow up.      Patient/family agree with plan, as outlined.         Kwasi Delgado MD, MHS  [facility]

## 2023-05-05 ENCOUNTER — OFFICE VISIT (OUTPATIENT)
Dept: SLEEP MEDICINE | Facility: MEDICAL CENTER | Age: 73
End: 2023-05-05
Attending: NURSE PRACTITIONER
Payer: MEDICARE

## 2023-05-05 VITALS
HEART RATE: 61 BPM | OXYGEN SATURATION: 95 % | SYSTOLIC BLOOD PRESSURE: 120 MMHG | RESPIRATION RATE: 16 BRPM | HEIGHT: 67 IN | DIASTOLIC BLOOD PRESSURE: 60 MMHG | BODY MASS INDEX: 33.23 KG/M2 | WEIGHT: 211.7 LBS

## 2023-05-05 DIAGNOSIS — G47.34 NOCTURNAL HYPOXIA: ICD-10-CM

## 2023-05-05 DIAGNOSIS — G47.31 COMPLEX SLEEP APNEA SYNDROME: ICD-10-CM

## 2023-05-05 PROCEDURE — 99213 OFFICE O/P EST LOW 20 MIN: CPT | Performed by: NURSE PRACTITIONER

## 2023-05-05 PROCEDURE — 99212 OFFICE O/P EST SF 10 MIN: CPT | Performed by: NURSE PRACTITIONER

## 2023-05-05 ASSESSMENT — FIBROSIS 4 INDEX: FIB4 SCORE: 1.7

## 2023-05-05 NOTE — PROGRESS NOTES
Chief Complaint   Patient presents with    Apnea       HPI:  Ravindra Raines is a 73 y.o. year old male here today for follow-up on CSA with first compliance on CPAP.  Last seen 1/26/2023 by BHAVNA Dowell for sleep study results. PMH includes BPH, hyperlipidemia, hypertension, mitral valve insufficiency, cardiac murmur, aortic calcification, complex sleep apnea, nocturnal hypoxia, chronic pansinusitis, GERD, hypertrophy of nasal turbinates, syncopal episode, former smoker, tonsillectomy, obesity.    Patient previously had problems with snoring and poor sleep quality which caused excessive daytime sleepiness.  He received his ASV device in February 2023.  He is currently using a nasal mask and denies any difficulty with mask fit or pressures.  He gets an average of 7 hours sleep and denies any difficulty falling or staying asleep.  Overall he notes significant improvement in sleep quality.  He is sleeping longer throughout the night and only waking up 1 time per night now.  He is no longer falling asleep during the day.  He notes improved energy levels.  He denies any excessive daytime sleepiness, morning headaches, palpitations, concentration or memory problems.    30-day compliance reviewed with patient shows 100% use with an average time of 7 hours and 11 minutes.  Currently on ASV with settings of 5 EPAP, pressure support 3 to 15 cm/H2O.  Current AHI on 30-day compliance is 0.6.  There is no evidence of mask leak.    Sleep/Card Study History:   PSG titration study from 1/12/23 indicated CPAP was tried from 5-6cm H2O. Bipap was tried from 8/4-10/6cm H2O. ASV was tried with EPAP 5-7cm H2O, Min PS 3cm H2O, Max PS 10-15cm H2O. This was a fully attended sleep study. This test was technically adequate. This patient was titrated on CPAP starting at 5 cm of water pressure. Patient was titrated up to ASV EPAP 7 PS 3-15 cm of water pressure. Patient did best at ASV EPAP 5 pressure support 3-15 cm of water pressure.  "Patient spent 134 minutes at that pressure and their AHI was 2.7 which is considered treated obstructive sleep apnea. There was a mean oxygen saturation of 93.0%. The minimum oxygen saturation in NREM was 84.0 % and in REM was 89.0%. The patient spent 5.1 minutes of TST with SaO2 <88%. Recommend ASV EPAP 5 pressure support 3-15 cm. Patient used a large AirFit N 20 mask during night of study.     PSG study from 11/21/22 indicated severe obstructive sleep apnea with overall AHI 57.4. Central apneas accounted for 45% of respiratory events. Nocturnal hypoxia likely secondary to untreated sleep apnea minimum oxygen saturation 79% time at or below 88% saturation 32.7 minutes. PLM's noted. Sleep was fragmented with a poor sleep efficiency of 60%     Echo from 7/6/22 indicated normal left ventricular size and systolic function. Left ventricular ejection fraction estimated to be 60%. Mildly dilated left atrium with mild mitral regurgitation. Unable to estimate pulmonary artery pressure due to an inadequate tricuspid regurgitant jet. Normal aortic root for body surface area. The ascending aorta is dilated with a diameter of 4.1 cm.    ROS: As per HPI and otherwise negative if not stated.    Past Medical History:   Diagnosis Date    Apnea, sleep     Arrhythmia 09/02/2021    BPH associated with nocturia     Chickenpox     Frequent urination     [place] measles     Snoring     Wears glasses        Past Surgical History:   Procedure Laterality Date    APPENDECTOMY  1990    TONSILLECTOMY         Family History   Problem Relation Age of Onset    Cancer Mother         br ca    Heart Disease Mother     Cancer Father     Cancer Brother         brain ca       Allergies as of 05/05/2023    (No Known Allergies)        Vitals:  /60 (BP Location: Left arm, Patient Position: Sitting, BP Cuff Size: Adult)   Pulse 61   Resp 16   Ht 1.702 m (5' 7\")   Wt 96 kg (211 lb 11.2 oz)   SpO2 95%     Current medications as of today   Current " Outpatient Medications   Medication Sig Dispense Refill    amLODIPine (NORVASC) 10 MG Tab TAKE 1 TABLET BY MOUTH EVERY DAY 90 Tablet 0    terbinafine (LAMISIL) 250 MG Tab TAKE 1 TABLET BY MOUTH EVERY DAY FOR NAIL FUNGUS      tamsulosin (FLOMAX) 0.4 MG capsule TAKE 2 CAPSULES BY MOUTH EVERY  Capsule 0    levETIRAcetam (KEPPRA) 500 MG Tab Take 1 Tablet by mouth 2 times a day. 180 Tablet 1    atorvastatin (LIPITOR) 40 MG Tab TAKE 1 TABLET BY MOUTH EVERYDAY AT BEDTIME 90 Tablet 1    lisinopril (PRINIVIL) 40 MG tablet TAKE 1 TABLET BY MOUTH EVERY DAY 90 Tablet 1    Multiple Vitamins-Minerals (DAILY MULTI VITAMIN/MINERALS PO) Take 1 Tablet by mouth every day.      VITAMIN D PO Take 1 Tablet by mouth every day.       No current facility-administered medications for this visit.         Physical Exam:   Gen:           Alert and oriented, No apparent distress. Mood and affect appropriate, normal interaction with examiner.  Eyes:          PERRL, EOM intact, sclere white, conjunctive moist.  Ears:          Not examined.   Hearing:     Grossly intact.  Nose:          Normal, no lesions or deformities.  Dentition:    Good dentition.  Oropharynx:   Tongue normal, posterior pharynx without erythema or exudate.  Neck:        Supple, trachea midline, no masses.  Respiratory Effort: No intercostal retractions or use of accessory muscles.   Lung Auscultation:      Clear to auscultation bilaterally; no rales, rhonchi or wheezing.  CV:            Regular rate and rhythm. No murmurs, rubs or gallops.  Abd:           Not examined.   Lymphadenopathy: Not examined.  Gait and Station: Normal.  Digits and Nails: No clubbing, cyanosis, petechiae, or nodes.   Cranial Nerves: II-XII grossly intact.  Skin:        No rashes, lesions or ulcers noted.               Ext:           No cyanosis or edema.      Assessment:  1. Complex sleep apnea syndrome        2. Nocturnal hypoxia            Plan:  Patient is using and benefiting from ASV  therapy.  Compliance shows adequate use and control of CSA.  30-day compliance was reviewed.  Patient recommended to follow-up in 9 months for annual CSA.  Be seen sooner if needed.  Resolved with ASV use.    Please note that this dictation was created using voice recognition software. I have made every reasonable attempt to correct obvious errors, but it is possible there are errors of grammar and possibly content that I did not discover before finalizing the note.

## 2023-05-11 ENCOUNTER — OFFICE VISIT (OUTPATIENT)
Dept: MEDICAL GROUP | Facility: MEDICAL CENTER | Age: 73
End: 2023-05-11
Payer: MEDICARE

## 2023-05-11 ENCOUNTER — HOSPITAL ENCOUNTER (OUTPATIENT)
Dept: LAB | Facility: MEDICAL CENTER | Age: 73
End: 2023-05-11
Attending: PODIATRIST
Payer: MEDICARE

## 2023-05-11 DIAGNOSIS — D64.9 ANEMIA, UNSPECIFIED TYPE: ICD-10-CM

## 2023-05-11 DIAGNOSIS — N40.1 BPH ASSOCIATED WITH NOCTURIA: ICD-10-CM

## 2023-05-11 DIAGNOSIS — R35.1 BPH ASSOCIATED WITH NOCTURIA: ICD-10-CM

## 2023-05-11 DIAGNOSIS — B35.1 ONYCHOMYCOSIS: ICD-10-CM

## 2023-05-11 DIAGNOSIS — G47.30 SLEEP APNEA, UNSPECIFIED TYPE: ICD-10-CM

## 2023-05-11 DIAGNOSIS — E66.01 MORBID (SEVERE) OBESITY DUE TO EXCESS CALORIES (HCC): ICD-10-CM

## 2023-05-11 DIAGNOSIS — I15.9 SECONDARY HYPERTENSION: ICD-10-CM

## 2023-05-11 LAB
ALBUMIN SERPL BCP-MCNC: 4.2 G/DL (ref 3.2–4.9)
ALP SERPL-CCNC: 148 U/L (ref 30–99)
ALT SERPL-CCNC: 21 U/L (ref 2–50)
AST SERPL-CCNC: 19 U/L (ref 12–45)
BILIRUB CONJ SERPL-MCNC: <0.2 MG/DL (ref 0.1–0.5)
BILIRUB INDIRECT SERPL-MCNC: ABNORMAL MG/DL (ref 0–1)
BILIRUB SERPL-MCNC: 0.7 MG/DL (ref 0.1–1.5)
PROT SERPL-MCNC: 6.8 G/DL (ref 6–8.2)

## 2023-05-11 PROCEDURE — 36415 COLL VENOUS BLD VENIPUNCTURE: CPT

## 2023-05-11 PROCEDURE — 99214 OFFICE O/P EST MOD 30 MIN: CPT | Performed by: NURSE PRACTITIONER

## 2023-05-11 PROCEDURE — 3078F DIAST BP <80 MM HG: CPT | Performed by: NURSE PRACTITIONER

## 2023-05-11 PROCEDURE — 3074F SYST BP LT 130 MM HG: CPT | Performed by: NURSE PRACTITIONER

## 2023-05-11 PROCEDURE — 80076 HEPATIC FUNCTION PANEL: CPT

## 2023-05-11 RX ORDER — TAMSULOSIN HYDROCHLORIDE 0.4 MG/1
0.4 CAPSULE ORAL DAILY
Qty: 90 CAPSULE | Refills: 1 | Status: SHIPPED | OUTPATIENT
Start: 2023-05-11 | End: 2023-12-08 | Stop reason: SDUPTHER

## 2023-05-11 RX ORDER — AMLODIPINE BESYLATE 5 MG/1
5 TABLET ORAL DAILY
Qty: 90 TABLET | Refills: 0 | Status: SHIPPED | OUTPATIENT
Start: 2023-05-11 | End: 2023-08-07 | Stop reason: SDUPTHER

## 2023-05-11 ASSESSMENT — FIBROSIS 4 INDEX: FIB4 SCORE: 1.82

## 2023-05-11 NOTE — ASSESSMENT & PLAN NOTE
Chronic problem.  Followed by pulmonology.  On ASV mask.  Needs portable oxygen.  We will communicate with pulmonology.  Patient was cleared by neurology to drive and has his DMV license back.

## 2023-05-11 NOTE — ASSESSMENT & PLAN NOTE
Chronic and stable.  BP today is 100/52 pulse 55.  Take lisinopril 40 mg and amlodipine 10 mg.  Started having bilateral ankle swelling.  Pending cardiac echo.  We will also decrease amlodipine down to 5 mg nightly and follow-up patient is having any side effects and ankle swelling.

## 2023-05-11 NOTE — PROGRESS NOTES
"No chief complaint on file.      HISTORY OF PRESENT ILLNESS: Patient is a 73 y.o. male, established patient who presents today to discuss medical problems as listed below:    Health Maintenance:  COMPLETED       Allergies: Patient has no known allergies.    Current Outpatient Medications   Medication Sig Dispense Refill    tamsulosin (FLOMAX) 0.4 MG capsule Take 1 Capsule by mouth every day. 90 Capsule 1    amLODIPine (NORVASC) 5 MG Tab Take 1 Tablet by mouth every day. 90 Tablet 0    terbinafine (LAMISIL) 250 MG Tab TAKE 1 TABLET BY MOUTH EVERY DAY FOR NAIL FUNGUS      levETIRAcetam (KEPPRA) 500 MG Tab Take 1 Tablet by mouth 2 times a day. 180 Tablet 1    atorvastatin (LIPITOR) 40 MG Tab TAKE 1 TABLET BY MOUTH EVERYDAY AT BEDTIME 90 Tablet 1    lisinopril (PRINIVIL) 40 MG tablet TAKE 1 TABLET BY MOUTH EVERY DAY 90 Tablet 1    Multiple Vitamins-Minerals (DAILY MULTI VITAMIN/MINERALS PO) Take 1 Tablet by mouth every day.      VITAMIN D PO Take 1 Tablet by mouth every day.       No current facility-administered medications for this visit.       Allergies, past medical history, past surgical history, family history, social history reviewed and updated.    Review of Systems:     - Constitutional: Negative for fever, chills, unexpected weight change, and fatigue/generalized weakness.     - Respiratory: Negative for cough, sputum production, chest congestion, dyspnea, wheezing, and crackles.      - Cardiovascular: Negative for chest pain, palpitations, orthopnea, and bilateral lower extremity edema.       - Psychiatric/Behavioral: Negative for depression, suicidal/homicidal ideation and memory loss.      All other systems reviewed and are negative    Exam:    /52 (BP Location: Left arm, Patient Position: Sitting, BP Cuff Size: Adult)   Pulse (!) 55   Temp 37 °C (98.6 °F) (Temporal)   Ht 1.702 m (5' 7\")   Wt 112 kg (246 lb 14.6 oz)   SpO2 94%   BMI 38.67 kg/m²  Body mass index is 38.67 kg/m².    Physical " Exam:  Constitutional: Well-developed and well-nourished. Not diaphoretic. No distress.   Cardiovascular: Regular rate and rhythm, S1 and S2 without murmur, rubs, or gallops.    Chest: Effort normal. Clear to auscultation throughout. No adventitious sounds. Neurological: Alert and oriented x 3.   Psychiatric:  Behavior, mood, and affect are appropriate.  MA/nursing note and vitals reviewed.    LABS: 2023  results reviewed and discussed with the patient, questions answered.    Assessment/Plan:  Sleep apnea  Chronic problem.  Followed by pulmonology.  On ASV mask.  Needs portable oxygen.  We will communicate with pulmonology.  Patient was cleared by neurology to drive and has his DMV license back.    Onychomycosis  History of onychomycosis on bilateral feet, big toes.  On Lamisil 250 mg daily.  Need LFTs.    BPH associated with nocturia  Chronic and stable.  Currently on tamsulosin 0.4 mg once daily and doing great.  Waking up no more than once nightly.  Previously on double dose.  Needs a refill today.    HTN (hypertension)  Chronic and stable.  BP today is 100/52 pulse 55.  Take lisinopril 40 mg and amlodipine 10 mg.  Started having bilateral ankle swelling.  Pending cardiac echo.  We will also decrease amlodipine down to 5 mg nightly and follow-up patient is having any side effects and ankle swelling.    1. Sleep apnea, unspecified type  Stable, followed by pulmonary    2. Onychomycosis  Improving, followed by podiatry, will monitor liver f-n  - Comp Metabolic Panel; Standing    3. Anemia, unspecified type  This was once noted in labs during ED visit in 1/2023  - CBC WITH DIFFERENTIAL; Future    4. BPH associated with nocturia  Stable on current regimen.  Continue.  Refills given   - tamsulosin (FLOMAX) 0.4 MG capsule; Take 1 Capsule by mouth every day.  Dispense: 90 Capsule; Refill: 1    5. Secondary hypertension  We will decrease amlodipine down to 5 mg.  Patient to monitor BP at home.  If blood pressures are  equal to above 130 systolic return for evaluation.  - amLODIPine (NORVASC) 5 MG Tab; Take 1 Tablet by mouth every day.  Dispense: 90 Tablet; Refill: 0    6. Morbid (severe) obesity due to excess calories (HCC)  Stable on current regimen.  Continue.  Refills given     7. Body mass index (BMI) 38.0-38.9, adult  Stable on current regimen.  Continue.  Refills given       Discussed with patient possible alternative diagnoses, patient is to take all medications as prescribed.      If symptoms persist FU w/PCP, if symptoms worsen go to emergency room.      If experiencing any side effects from prescribed medications report to the office immediately or go to emergency room.     Reviewed indication, dosage, usage and potential adverse effects of prescribed medications.      Reviewed risks and benefits of treatment plan. Patient verbalizes understanding of all instruction and verbally agrees to plan.     Discussed plan with the patient, and patient agrees to the above.      I personally reviewed prior external notes and test results pertinent to today's visit.      No follow-ups on file. 3 mos or earlier

## 2023-05-11 NOTE — ASSESSMENT & PLAN NOTE
Chronic and stable.  Currently on tamsulosin 0.4 mg once daily and doing great.  Waking up no more than once nightly.  Previously on double dose.  Needs a refill today.

## 2023-05-12 VITALS
DIASTOLIC BLOOD PRESSURE: 52 MMHG | WEIGHT: 206 LBS | BODY MASS INDEX: 32.33 KG/M2 | SYSTOLIC BLOOD PRESSURE: 100 MMHG | HEART RATE: 55 BPM | OXYGEN SATURATION: 94 % | TEMPERATURE: 98.6 F | HEIGHT: 67 IN

## 2023-06-27 ENCOUNTER — OFFICE VISIT (OUTPATIENT)
Dept: MEDICAL GROUP | Facility: MEDICAL CENTER | Age: 73
End: 2023-06-27
Payer: MEDICARE

## 2023-06-27 VITALS
WEIGHT: 211.64 LBS | BODY MASS INDEX: 33.22 KG/M2 | OXYGEN SATURATION: 94 % | HEIGHT: 67 IN | DIASTOLIC BLOOD PRESSURE: 60 MMHG | HEART RATE: 70 BPM | SYSTOLIC BLOOD PRESSURE: 126 MMHG | TEMPERATURE: 98.5 F

## 2023-06-27 DIAGNOSIS — R35.1 BPH ASSOCIATED WITH NOCTURIA: ICD-10-CM

## 2023-06-27 DIAGNOSIS — N40.1 BPH ASSOCIATED WITH NOCTURIA: ICD-10-CM

## 2023-06-27 DIAGNOSIS — R30.0 DYSURIA: ICD-10-CM

## 2023-06-27 PROCEDURE — 3074F SYST BP LT 130 MM HG: CPT | Performed by: STUDENT IN AN ORGANIZED HEALTH CARE EDUCATION/TRAINING PROGRAM

## 2023-06-27 PROCEDURE — 3078F DIAST BP <80 MM HG: CPT | Performed by: STUDENT IN AN ORGANIZED HEALTH CARE EDUCATION/TRAINING PROGRAM

## 2023-06-27 PROCEDURE — 99214 OFFICE O/P EST MOD 30 MIN: CPT | Performed by: STUDENT IN AN ORGANIZED HEALTH CARE EDUCATION/TRAINING PROGRAM

## 2023-06-27 ASSESSMENT — FIBROSIS 4 INDEX: FIB4 SCORE: 1.82

## 2023-06-28 NOTE — PROGRESS NOTES
"Subjective:     CC: dysuria    HPI:   Ravindra presents today for dysuria    Problem   Dysuria    Acute condition.    Character: increased frequency, painful urination  Onset: yesterday, was in [place]  Location:   Duration: constant  Exacerbating factors: unknown  Relieving factors: unknown  Associated symptoms: none, denies hematuria  Severity: persistent    Denies recent injuries, changes in sexual behavior or partners, exposure to water or bathtubs.     Bph Associated With Nocturia    Chronic condition. Due to dysuria symptoms, he recently increased tamsulosin to 0.8mg daily and has not noticed any difference.    Current regimen: tamsulosin 0.4mg daily         Current Outpatient Medications Ordered in Epic   Medication Sig Dispense Refill    levETIRAcetam (KEPPRA) 500 MG Tab Take 1 Tablet by mouth 2 times a day. 180 Tablet 2    tamsulosin (FLOMAX) 0.4 MG capsule Take 1 Capsule by mouth every day. 90 Capsule 1    amLODIPine (NORVASC) 5 MG Tab Take 1 Tablet by mouth every day. 90 Tablet 0    terbinafine (LAMISIL) 250 MG Tab TAKE 1 TABLET BY MOUTH EVERY DAY FOR NAIL FUNGUS      atorvastatin (LIPITOR) 40 MG Tab TAKE 1 TABLET BY MOUTH EVERYDAY AT BEDTIME 90 Tablet 1    lisinopril (PRINIVIL) 40 MG tablet TAKE 1 TABLET BY MOUTH EVERY DAY 90 Tablet 1    Multiple Vitamins-Minerals (DAILY MULTI VITAMIN/MINERALS PO) Take 1 Tablet by mouth every day.      VITAMIN D PO Take 1 Tablet by mouth every day.       No current [facility]-ordered facility-administered medications on file.     ROS:  See HPI    Objective:     Exam:  /60 (BP Location: Left arm, Patient Position: Sitting, BP Cuff Size: Adult)   Pulse 70   Temp 36.9 °C (98.5 °F) (Temporal)   Ht 1.702 m (5' 7\")   Wt 96 kg (211 lb 10.3 oz)   SpO2 94%   BMI 33.15 kg/m²  Body mass index is 33.15 kg/m².    Gen: Alert and oriented, No apparent distress.  Lungs: Normal effort, CTA bilaterally, no wheezes, rhonchi, or rales  CV: Regular rate and rhythm. " No murmurs, rubs, or gallops.  Abdomen: Soft, nondistended. Mild suprapubic discomfort. No CVA tenderness. Normal bowel sounds.  Ext: No clubbing, cyanosis, edema.    Labs: POC urine negative    Assessment & Plan:     73 y.o. male with the following -     1. Dysuria  Acute condition, persistent. Nontoxic appearing. POC urine negative. Suspect possible mild dehydration. Advised to increase fluid hydration and monitor progression of his symptoms for the next few days.  - POCT Urinalysis    2. BPH associated with nocturia  Chronic condition, stable. Do not believe this is contributing to his urinary symptoms at this time. Advised patient that he can go back to 0.4mg daily dosing.  - cont current regimen: tamusulosin 0.4mg daily    Return if symptoms worsen or fail to improve.    Please note that this dictation was created using voice recognition software. I have made every reasonable attempt to correct obvious errors, but I expect that there are errors of grammar and possibly content that I did not discover before finalizing the note.

## 2023-07-20 DIAGNOSIS — I15.9 SECONDARY HYPERTENSION: ICD-10-CM

## 2023-07-20 RX ORDER — LISINOPRIL 40 MG/1
40 TABLET ORAL DAILY
Qty: 90 TABLET | Refills: 1 | Status: SHIPPED | OUTPATIENT
Start: 2023-07-20 | End: 2023-08-07 | Stop reason: SDUPTHER

## 2023-07-26 ENCOUNTER — HOSPITAL ENCOUNTER (OUTPATIENT)
Dept: LAB | Facility: MEDICAL CENTER | Age: 73
End: 2023-07-26
Attending: PODIATRIST
Payer: MEDICARE

## 2023-07-26 LAB
ALBUMIN SERPL BCP-MCNC: 4 G/DL (ref 3.2–4.9)
ALP SERPL-CCNC: 127 U/L (ref 30–99)
ALT SERPL-CCNC: 18 U/L (ref 2–50)
AST SERPL-CCNC: 17 U/L (ref 12–45)
BILIRUB CONJ SERPL-MCNC: <0.2 MG/DL (ref 0.1–0.5)
BILIRUB INDIRECT SERPL-MCNC: ABNORMAL MG/DL (ref 0–1)
BILIRUB SERPL-MCNC: 0.6 MG/DL (ref 0.1–1.5)
PROT SERPL-MCNC: 6.6 G/DL (ref 6–8.2)

## 2023-07-26 PROCEDURE — 36415 COLL VENOUS BLD VENIPUNCTURE: CPT

## 2023-07-26 PROCEDURE — 80076 HEPATIC FUNCTION PANEL: CPT

## 2023-07-29 DIAGNOSIS — E78.5 HYPERLIPIDEMIA, UNSPECIFIED HYPERLIPIDEMIA TYPE: ICD-10-CM

## 2023-08-02 RX ORDER — ATORVASTATIN CALCIUM 40 MG/1
TABLET, FILM COATED ORAL
Qty: 90 TABLET | Refills: 1 | Status: SHIPPED | OUTPATIENT
Start: 2023-08-02 | End: 2024-01-30

## 2023-08-07 ENCOUNTER — OFFICE VISIT (OUTPATIENT)
Dept: NEUROLOGY | Facility: MEDICAL CENTER | Age: 73
End: 2023-08-07
Attending: PSYCHIATRY & NEUROLOGY
Payer: MEDICARE

## 2023-08-07 VITALS
OXYGEN SATURATION: 95 % | WEIGHT: 209.66 LBS | SYSTOLIC BLOOD PRESSURE: 104 MMHG | RESPIRATION RATE: 16 BRPM | HEART RATE: 62 BPM | TEMPERATURE: 98.3 F | DIASTOLIC BLOOD PRESSURE: 52 MMHG | HEIGHT: 67 IN | BODY MASS INDEX: 32.91 KG/M2

## 2023-08-07 DIAGNOSIS — I15.9 SECONDARY HYPERTENSION: ICD-10-CM

## 2023-08-07 DIAGNOSIS — G47.30 SLEEP APNEA, UNSPECIFIED TYPE: ICD-10-CM

## 2023-08-07 DIAGNOSIS — G40.909 SEIZURE DISORDER (HCC): ICD-10-CM

## 2023-08-07 PROCEDURE — 3074F SYST BP LT 130 MM HG: CPT | Performed by: PSYCHIATRY & NEUROLOGY

## 2023-08-07 PROCEDURE — 3078F DIAST BP <80 MM HG: CPT | Performed by: PSYCHIATRY & NEUROLOGY

## 2023-08-07 PROCEDURE — 99212 OFFICE O/P EST SF 10 MIN: CPT | Performed by: PSYCHIATRY & NEUROLOGY

## 2023-08-07 PROCEDURE — 99215 OFFICE O/P EST HI 40 MIN: CPT | Performed by: PSYCHIATRY & NEUROLOGY

## 2023-08-07 ASSESSMENT — FIBROSIS 4 INDEX: FIB4 SCORE: 1.76

## 2023-08-07 NOTE — TELEPHONE ENCOUNTER
Received request via: Pharmacy    Was the patient seen in the last year in this department? Yes    Does the patient have an active prescription (recently filled or refills available) for medication(s) requested? yes    Does the patient have [facility] and need 100 day supply (blood pressure, diabetes and cholesterol meds only)? Patient does not have SCP   Requested Prescriptions     Pending Prescriptions Disp Refills    amLODIPine (NORVASC) 5 MG Tab 90 Tablet 0     Sig: Take 1 Tablet by mouth every day.

## 2023-08-07 NOTE — TELEPHONE ENCOUNTER
Received request via: Pharmacy    Was the patient seen in the last year in this department? Yes    Does the patient have an active prescription (recently filled or refills available) for medication(s) requested? yes    Does the patient have [facility] and need 100 day supply (blood pressure, diabetes and cholesterol meds only)? Patient does not have SCP   Requested Prescriptions     Pending Prescriptions Disp Refills    lisinopril (PRINIVIL) 40 MG tablet 90 Tablet 1     Sig: Take 1 Tablet by mouth every day.

## 2023-08-07 NOTE — PROGRESS NOTES
NEUROLOGY FOLLOW UP VISIT NOTE       Chief Complaint: History of seizure     INTERVAL HISTORY FROM PREVIOUS CLINIC VISIT :    is a 73 year old right handed, gentleman coming to neurology clinic for follow up of his episodes in 2022 and 2023.  He was unaccompanied for today's clinic visit. He was previously seen in neurology clinic in 2023 and details of his complaints were documented in my initial consult note from 2023.     Since the last visit with me, he denied any episodes of concern.  He is tolerating his levetiracetam 500 mg twice daily without any side effects.  He continues to follow-up with his sleep physician.  He has been driving since May 2023. He denied any previous episodes of changes in awareness or loss of consciousness.  He denied any lightheadedness palpitation or focal weakness in upper or lower extremities.  No other episodes of confusion. He has been using the CPAP machine.      Epilepsy Risk Factors:  No known risk factors, including  insults, developmental delay, febrile seizures, CNS infections/strokes, head trauma, or family history of epilepsy     Current AED regimen: Levetiracetam 500 mg BID     Last seizure: 2023      REVIEW OF HISTORY OF PRESENTING COMPLAINT:  In summary, the first episode of concern was on 2022 around 2 AM in the morning and patient's wife was woken up to more than usual loud snoring, followed by i a heavy sigh, and she had difficulty waking him. She also mentioned that his bilateral upper extremities were semiflexed and close to his body with small shaking movements.  There was no tongue biting or incontinence with this episode.  Patient does not recall any details other than waking up in the emergency room.  EMS transported him to the ER and found to be hypoxic on room air.  There was concern for sleep apnea as there was desaturation to the 70's noted and  patient was discharged with night oxygen therapy till he had a formal sleep study completed. He had routine EEG study and an MRI of the brain completed at that time which were both within normal limits.  No leucocytosis on presentation and urine drug screen was negative.  CMP showed a glucose of 200.  He was discharged home and had a formal sleep study completed in 11/2022, and diagnosed as severe obstructive sleep apnea and central apnea accounting for 45% of respiratory events.  There was nocturnal hypoxia and periodic limb movements noted.     A CPAP machine was ordered and he started using in from 02/20/2023.  However on February 6, 2023 around 3 AM patient remembers waking up to use the restroom and using nasal spray for obstructed nasal passages. He may have fallen back asleep without putting the nasal cannula back on and his wife was woken up around 3:30 AM again to loud yell and some minimal shaking lasting less than a minute. He was on his side and there was tongue biting associated with this episode and wife was able to lower him to the floor with the help of her grandson who lives with them.  Patient returned to baseline when the EMT's came to the house.  He was taken to hospital and started on levetiracetam 500 mg twice daily.      Previous Investigations:  EEG: 10/2022: This is a normal video EEG recording in the awake, drowsy and sleep states     MRI Brain: 10/2022: No acute abnormality. Mild chronic microvascular ischemic disease. Mild cerebral volume loss      CURRENT MEDICATIONS AT THE TIME OF THIS ENCOUNTER:    Current Outpatient Medications:     atorvastatin, TAKE 1 TABLET BY MOUTH EVERYDAY AT BEDTIME, Taking    lisinopril, 40 mg, Oral, DAILY, Taking    levETIRAcetam, 500 mg, Oral, BID, Taking    tamsulosin, 0.4 mg, Oral, DAILY, Taking    amLODIPine, 5 mg, Oral, DAILY, Taking    terbinafine, TAKE 1 TABLET BY MOUTH EVERY DAY FOR NAIL FUNGUS, Taking    Multiple Vitamins-Minerals (DAILY MULTI  "VITAMIN/MINERALS PO), 1 Tablet, Oral, DAILY, Taking    VITAMIN D PO, 1 Tablet, Oral, DAILY, Taking     PAST MEDICAL HISTORY:  Past Medical History:   Diagnosis    Complex sleep apnea    Heart murmur     BPH associated with nocturia     Hyperlipidemia      Seizure 10/2022 and 2023    Hypertension       PAST SURGICAL HISTORY:  Past Surgical History:   Procedure Laterality Date    APPENDECTOMY      TONSILLECTOMY          FAMILY HISTORY:  Family History   Problem Relation Age of Onset    Cancer Mother         br ca    Heart Disease Mother     Cancer Father     Cancer Brother         brain ca        SOCIAL HISTORY:  Social History     Tobacco Use    Smoking status: Former     Types: Cigarettes     Start date: 1970     Quit date: 1979     Years since quittin.6    Smokeless tobacco: Never    Tobacco comments:     0.5 x 5 yrs, stopped at age 26   Vaping Use    Vaping Use: Never used   Substance Use Topics    Alcohol use: Not Currently     Comment: occ    Drug use: Never       Review of systems:      All other systems are reviewed and negative other than the ones mentioned in HPI.     EXAM:   Ambulatory Vitals:  /52 (BP Location: Left arm, Patient Position: Sitting, BP Cuff Size: Adult)   Pulse 62   Temp 36.8 °C (98.3 °F) (Temporal)   Resp 16   Ht 1.702 m (5' 7\")   Wt 95.1 kg (209 lb 10.5 oz)   SpO2 95%      Neurological Exam:  Higher Mental Function:   Awake, alert and oriented to place, person and time  Able to answer questions appropriately and follow commands well  Memory intact to immediate recall and remote events  Speech is clear and language fluent   Mood: affect appears normal     Cranial Nerves:  CN II: Pupils equal and reactive, no visual field deficits on confrontation  CN III,IV, VI : EOM intact with no nystagmus  CN V: Facial sensation intact  CN VII: No facial asymmetry  CN VIII: Intact hearing to conversation  CN IX, X: palate elevates symmetrically   CN XI: Symmetric shoulder " shrug  CN XII: tongue midline. No signs of tongue biting or fasciculations     Motor: 5/5  strength in bilateral upper and lower extremities, No tremor at rest or on outstretched hands. Tone normal and no fasciculations  Sensory: Intact to light touch, temperature, and vibration in all 4 extremities  Reflexes: 2+ and symmetric in all 4 extremities  Coordination: Intact to finger to nose in both upper extremities, no truncal or appendicular ataxia  Gait: Normal gait, without the use of any assistance. No difficulty getting up from the chair     General:   Patient in no acute distress, pleasant and cooperative.  HEENT: Normocephalic, no signs of acute trauma.   Neck: Supple. There is normal range of motion.     ASSESSMENT AND PLAN:  1.Seizure:    is a 73-year-old right-handed gentleman coming to neurology clinic for follow up of his 2 distinct nocturnal episodes , from sleep in October 2022 and in February 2023, with description most suggestive of nocturnal convulsions, and confusion following the episodes. The clinical history raises concern for an underlying epilepsy syndrome, even though there is a recent diagnosis of sleep apnea, which patient most likely had for years. He denied any previous episodes prior to 10/2022 and no clear trigger factors. Patient has severe complex sleep apnea recently diagnosed and started on CPAP machine on February 20, 2023. MRI Brain and EEG studies completed in October 2022 after the initial episode were both within normal limits.  He was started on levetiracetam following his second episode, which I recommended he continues to reduce his risk for recurrent seizures.    If his sleep apnea was likely a provoking factor and this is well controlled, and no other episodes to suggest an underlying epilepsy syndrome, he may not need long-term use of antiepileptic medication.  Our plan would be to consider a long-term ambulatory EEG study after 1-2  years, before we consider weaning  him off levetiracetam.  If he has any additional episodes of concern he may need higher doses of levetiracetam. He is tolerating the current dose without any side effects.     - Continue AED at current doses: Levetiracetam 500 mg BID     - Discussed side effects of medications and drug interactions       - Discussed avoidance of spell/sz triggers: alcohol, sleep deprivation, energy drinks, benadryl and stress.      - Discussed driving restrictions. No driving for 3 months following a seizure or episode of unexplained loss of consciousness per [place] state rule.      2. Sleep apnea:   Patient was diagnosed with complex sleep apnea with both obstructive and central component and started on CPAP. I recommended close follow-up with his sleep specialist for any device adjustments.  Hypoxia can lower his seizure threshold however 2 distinct episodes at night, raises possibility of an underlying epilepsy syndrome.     Total time of the visit was 44 minutes including time spent in pre charting, review of the previous history and test results, documentation, discussing medication safety, side effects, discussing plan of care and answering patient's questions .He will follow up with his primary care physician for other medical co morbidities. He will follow up with me in 9 months. If there are additional episodes of concern, he will reach out to our clinic sooner.      EDUCATION, COUNSELING:     - Education was provided to the patient and/or family regarding diagnosis and prognosis. The chronic and unpredictable nature of the condition were discussed. There is increased risk for additional events, which may carry potential for significant injuries and death. Discussed frequent seizure triggers: sleep deprivation, medication non-compliance, use of illegal drugs/alcohol, stress, and others.      - Reviewed in detail the current antiepileptic regimen. Potential side effects of antiepileptics were discussed at length, including but  no limited to: hypersensitivity reactions (rash and others, some of which can be fatal), visual field changes (some of which may be irreversible), glaucoma, diplopia, kidney stones, osteopenia/osteoporosis/ bone fractures, hyperthermia/anhydrosis, hyponatremia, tremors/abnormal movements, ataxia, dizziness, fatigue, increased risk for falls, risk for cardiac arrhythmias and syncope, weight changes, hair loss, gastrointestinal side effects(hepatitis, pancreatitis, gastritis, ulcers, gingival hypertrophy/bleeding, drowsiness, sedation, memory loss, trouble finding words, anxiety/nervousness, increased risk for suicide, increased risk for depression, and psychosis.      - Reviewed drug-drug interactions and their potential effect on seizure control and medication side effects.      -Recommend chronic vitamin D supplementation and regular exercise (if not contraindicated).      -Patient/family educated on risk for SUDEP (Sudden Death in Epilepsy). Counseling was provided on the importance of strict medication and follow up compliance. The patient/family understand the risks associated with non-adherence with the medical plan as outlined, including but not limited to an increased risk for breakthrough seizures, which may contribute to injuries, disability, status epilepticus, and even death.   -Counseling was also provided on potential effects of alcohol and other drugs, which may lower seizure threshold and/or affect the metabolism of antiepileptic drugs. We recommend avoidance of alcohol and illegal drugs and avoid sleep deprivation.      - Extensively discussed the aspects related to safety in drivers who suffer from epilepsy. The patient is encourage to report to the Division of Motor Vehicles of any condition and/or spells related to confusion, disorientation, and/or loss of awareness and/or loss of consciousness; as these may pose a safety issue if they occur while operating a motor vehicle. The patient and/or  family are ultimately responsible for exercising caution and abiding to regulations in place. The patient understands that only the Department of Motor Vehicles (DMV) is able to authorize an individual to drive, even after medical clearance, DMV clearance must be obtained.      -Other seizure precautions were discussed at length, including no diving, no skydiving, no climbing or exposure to unprotected heights, no unsupervised swimming, no Jacuzzi or bathing in bathtubs or deep bodies of water. The patient/family have been advised about risks for operating any machinery while suffering from seizures / syncope / epilepsy and/or while taking antiepileptic drugs.      -The patient understands and agrees that due to the complexity of his/her diagnosis, results of any testing and further recommendations will typically be discussed/made during a face to face encounter in office. The patient and/or family further understands it is their responsibility to keep proper follow up.      Patient/family agree with plan, as outlined.      Kwasi Delgado MD, MHS  [facility]

## 2023-08-08 RX ORDER — AMLODIPINE BESYLATE 5 MG/1
5 TABLET ORAL DAILY
Qty: 90 TABLET | Refills: 0 | Status: SHIPPED | OUTPATIENT
Start: 2023-08-08 | End: 2023-08-11

## 2023-08-08 RX ORDER — LISINOPRIL 40 MG/1
40 TABLET ORAL DAILY
Qty: 90 TABLET | Refills: 1 | Status: SHIPPED | OUTPATIENT
Start: 2023-08-08 | End: 2023-11-02 | Stop reason: SDUPTHER

## 2023-08-11 ENCOUNTER — OFFICE VISIT (OUTPATIENT)
Dept: MEDICAL GROUP | Facility: MEDICAL CENTER | Age: 73
End: 2023-08-11
Payer: MEDICARE

## 2023-08-11 VITALS
TEMPERATURE: 98.2 F | HEART RATE: 62 BPM | RESPIRATION RATE: 16 BRPM | HEIGHT: 67 IN | WEIGHT: 209.33 LBS | SYSTOLIC BLOOD PRESSURE: 130 MMHG | OXYGEN SATURATION: 97 % | DIASTOLIC BLOOD PRESSURE: 52 MMHG | BODY MASS INDEX: 32.85 KG/M2

## 2023-08-11 DIAGNOSIS — I15.9 SECONDARY HYPERTENSION: ICD-10-CM

## 2023-08-11 DIAGNOSIS — R56.9 SEIZURE-LIKE ACTIVITY (HCC): ICD-10-CM

## 2023-08-11 DIAGNOSIS — Z12.12 SCREENING FOR COLORECTAL CANCER: ICD-10-CM

## 2023-08-11 DIAGNOSIS — R74.8 ELEVATED ALKALINE PHOSPHATASE LEVEL: ICD-10-CM

## 2023-08-11 DIAGNOSIS — E78.5 HYPERLIPIDEMIA, UNSPECIFIED HYPERLIPIDEMIA TYPE: ICD-10-CM

## 2023-08-11 DIAGNOSIS — Z12.11 SCREENING FOR COLORECTAL CANCER: ICD-10-CM

## 2023-08-11 DIAGNOSIS — Z13.6 SCREENING FOR AAA (ABDOMINAL AORTIC ANEURYSM): ICD-10-CM

## 2023-08-11 PROBLEM — R30.0 DYSURIA: Status: RESOLVED | Noted: 2023-06-27 | Resolved: 2023-08-11

## 2023-08-11 PROBLEM — B35.1 ONYCHOMYCOSIS: Status: RESOLVED | Noted: 2023-05-11 | Resolved: 2023-08-11

## 2023-08-11 PROBLEM — R09.81 SINUS CONGESTION: Status: RESOLVED | Noted: 2022-10-26 | Resolved: 2023-08-11

## 2023-08-11 PROCEDURE — 99214 OFFICE O/P EST MOD 30 MIN: CPT | Performed by: NURSE PRACTITIONER

## 2023-08-11 PROCEDURE — 3078F DIAST BP <80 MM HG: CPT | Performed by: NURSE PRACTITIONER

## 2023-08-11 PROCEDURE — 3075F SYST BP GE 130 - 139MM HG: CPT | Performed by: NURSE PRACTITIONER

## 2023-08-11 RX ORDER — POLYETHYLENE GLYCOL 3350 17 G/17G
POWDER, FOR SOLUTION ORAL
COMMUNITY
Start: 2023-06-30 | End: 2023-08-11

## 2023-08-11 RX ORDER — OMEPRAZOLE 10 MG/1
1 CAPSULE, DELAYED RELEASE ORAL
COMMUNITY
End: 2023-08-11

## 2023-08-11 RX ORDER — ATORVASTATIN CALCIUM 10 MG/1
TABLET, FILM COATED ORAL
COMMUNITY
End: 2023-08-11

## 2023-08-11 RX ORDER — TAMSULOSIN HYDROCHLORIDE 0.4 MG/1
CAPSULE ORAL
COMMUNITY
End: 2023-08-11

## 2023-08-11 RX ORDER — PHENAZOPYRIDINE HYDROCHLORIDE 100 MG/1
TABLET, FILM COATED ORAL
COMMUNITY
Start: 2023-06-30 | End: 2023-08-11

## 2023-08-11 RX ORDER — TAMSULOSIN HYDROCHLORIDE 0.4 MG/1
1 CAPSULE ORAL
COMMUNITY
End: 2023-08-11

## 2023-08-11 RX ORDER — AMLODIPINE BESYLATE 10 MG/1
1 TABLET ORAL
COMMUNITY
End: 2023-11-02 | Stop reason: SDUPTHER

## 2023-08-11 RX ORDER — LISINOPRIL 40 MG/1
1 TABLET ORAL
COMMUNITY
End: 2023-08-11

## 2023-08-11 RX ORDER — DOCUSATE SODIUM 100 MG/1
CAPSULE, LIQUID FILLED ORAL
COMMUNITY
Start: 2023-06-30 | End: 2023-12-08

## 2023-08-11 RX ORDER — ATORVASTATIN CALCIUM 40 MG/1
1 TABLET, FILM COATED ORAL
COMMUNITY
End: 2023-08-11

## 2023-08-11 RX ORDER — CEFDINIR 300 MG/1
CAPSULE ORAL
COMMUNITY
Start: 2023-06-30 | End: 2023-08-11

## 2023-08-11 RX ORDER — OMEPRAZOLE 10 MG/1
CAPSULE, DELAYED RELEASE ORAL
COMMUNITY
End: 2023-12-08

## 2023-08-11 RX ORDER — CHOLECALCIFEROL (VITAMIN D3) 10(400)/ML
DROPS ORAL
COMMUNITY
End: 2023-08-11

## 2023-08-11 RX ORDER — AMLODIPINE BESYLATE 5 MG/1
1 TABLET ORAL
COMMUNITY
End: 2023-08-11

## 2023-08-11 ASSESSMENT — FIBROSIS 4 INDEX: FIB4 SCORE: 1.76

## 2023-08-11 NOTE — ASSESSMENT & PLAN NOTE
Latest Reference Range & Units 02/06/23 05:01 05/11/23 12:05 07/26/23 07:52   Alkaline Phosphatase 30 - 99 U/L 112 (H) 148 (H) 127 (H)   (H): Data is abnormally high  New problem.  Patient denies musculoskeletal or joint pain.

## 2023-08-11 NOTE — ASSESSMENT & PLAN NOTE
Chronic and stable BP today 130/52. On Lisinopril 40 , amlodipine 10 mg.  BP WNL.  Denies CP, SOB. Ankle swelling.

## 2023-08-11 NOTE — ASSESSMENT & PLAN NOTE
Chronic and stable.  Currently on Keppra.  Following up with neurology as well as pulmonology.  Patient was told by neurology he will continue on Keppra.  He is doing well, no symptoms, no drowsiness.

## 2023-08-11 NOTE — PROGRESS NOTES
"Chief Complaint   Patient presents with    Lab Results    Hypertension Follow-up       HISTORY OF PRESENT ILLNESS: Patient is a 73 y.o. male, established patient who presents today to discuss medical problems as listed below:    Health Maintenance:  COMPLETED       Allergies: Patient has no known allergies.    Current Outpatient Medications   Medication Sig Dispense Refill    docusate sodium (COLACE) 100 MG Cap TAKE 1 CAPSULE BY MOUTH TWICE A DAY AS NEEDED FOR CONSTIPATION      omeprazole (PRILOSEC) 10 MG CAPSULE DELAYED RELEASE Omeprazole      Ammonium Lactate 10 % Cream Ammonium Lactate      diclofenac CR (VOLTAREN-XR) 100 MG TABLET SR 24 HR tablet Take 1 Tablet by mouth every 24 hours as needed.      amLODIPine (NORVASC) 10 MG Tab Take 1 Tablet by mouth every day.      lisinopril (PRINIVIL) 40 MG tablet Take 1 Tablet by mouth every day. 90 Tablet 1    atorvastatin (LIPITOR) 40 MG Tab TAKE 1 TABLET BY MOUTH EVERYDAY AT BEDTIME 90 Tablet 1    tamsulosin (FLOMAX) 0.4 MG capsule Take 1 Capsule by mouth every day. 90 Capsule 1    terbinafine (LAMISIL) 250 MG Tab TAKE 1 TABLET BY MOUTH EVERY DAY FOR NAIL FUNGUS       No current facility-administered medications for this visit.       Allergies, past medical history, past surgical history, family history, social history reviewed and updated.    Review of Systems:     - Constitutional: Negative for fever, chills, unexpected weight change, and fatigue/generalized weakness.     - Respiratory: Negative for cough, sputum production, chest congestion, dyspnea, wheezing, and crackles.      - Cardiovascular: Negative for chest pain, palpitations, orthopnea, and bilateral lower extremity edema.     - Psychiatric/Behavioral: Negative for depression, suicidal/homicidal ideation and memory loss.      All other systems reviewed and are negative    Exam:    /52   Pulse 62   Temp 36.8 °C (98.2 °F) (Temporal)   Resp 16   Ht 1.702 m (5' 7\")   Wt 95 kg (209 lb 5.2 oz)   SpO2 " 97%   BMI 32.79 kg/m²  Body mass index is 32.79 kg/m².    Physical Exam:  Constitutional: Well-developed and well-nourished. Not diaphoretic. No distress.   Cardiovascular: Regular rate and rhythm, S1 and S2 without murmur, rubs, or gallops.    Chest: Effort normal. Clear to auscultation throughout. No adventitious sounds.   Neurological: Alert and oriented x 3. Psychiatric:  Behavior, mood, and affect are appropriate.  MA/nursing note and vitals reviewed.    LABS: 7/2023  results reviewed and discussed with the patient, questions answered.      Assessment/Plan:  HTN (hypertension)  Chronic and stable BP today 130/52. On Lisinopril 40 , amlodipine 10 mg.  BP WNL.  Denies CP, SOB. Ankle swelling.     Seizure-like activity (HCC)  Chronic and stable.  Currently on Keppra.  Following up with neurology as well as pulmonology.  Patient was told by neurology he will continue on Keppra.  He is doing well, no symptoms, no drowsiness.    Hyperlipidemia  Chronic problem.  On atorvastatin, tolerating well.    Elevated alkaline phosphatase level   Latest Reference Range & Units 02/06/23 05:01 05/11/23 12:05 07/26/23 07:52   Alkaline Phosphatase 30 - 99 U/L 112 (H) 148 (H) 127 (H)   (H): Data is abnormally high  New problem.  Patient denies musculoskeletal or joint pain.    1. Secondary hypertension  Stable on current regimen.  Continue.  Refills given   - CBC WITH DIFFERENTIAL; Future  - Comp Metabolic Panel; Future    2. Screening for colorectal cancer  - Referral to GI for Colonoscopy    3. Screening for AAA (abdominal aortic aneurysm)  - US-ABDOMINAL AORTA W/O DOPPLER; Future    4. Elevated alkaline phosphatase level  - ALKALINE PHOSPHATASE ISOENZYMES; Future    5. Hyperlipidemia, unspecified hyperlipidemia type  Stable on current regimen.  Continue.  Refills given   - Lipid Profile; Future    6. Seizure-like activity (HCC)  Stable, followed by neurology      Discussed with patient possible alternative diagnoses, patient is  to take all medications as prescribed.      If symptoms persist FU w/PCP, if symptoms worsen go to emergency room.      If experiencing any side effects from prescribed medications report to the office immediately or go to emergency room.     Reviewed indication, dosage, usage and potential adverse effects of prescribed medications.      Reviewed risks and benefits of treatment plan. Patient verbalizes understanding of all instruction and verbally agrees to plan.     Discussed plan with the patient, and patient agrees to the above.      I personally reviewed prior external notes and test results pertinent to today's visit.      No follow-ups on file. December

## 2023-08-29 ENCOUNTER — TELEPHONE (OUTPATIENT)
Dept: PHARMACY | Facility: MEDICAL CENTER | Age: 73
End: 2023-08-29
Payer: MEDICARE

## 2023-08-29 DIAGNOSIS — G40.909 SEIZURE DISORDER (HCC): ICD-10-CM

## 2023-08-29 RX ORDER — LEVETIRACETAM 500 MG/1
500 TABLET ORAL 2 TIMES DAILY
Qty: 180 TABLET | Refills: 3 | Status: SHIPPED | OUTPATIENT
Start: 2023-08-29 | End: 2024-08-28

## 2023-08-29 NOTE — TELEPHONE ENCOUNTER
Per patient request prescription for levetiracetam 500 mg 1 tablet twice a day sent to pharmacy with 3 refills

## 2023-09-19 ENCOUNTER — HOSPITAL ENCOUNTER (OUTPATIENT)
Dept: RADIOLOGY | Facility: MEDICAL CENTER | Age: 73
End: 2023-09-19
Attending: NURSE PRACTITIONER
Payer: MEDICARE

## 2023-09-19 DIAGNOSIS — Z13.6 SCREENING FOR AAA (ABDOMINAL AORTIC ANEURYSM): ICD-10-CM

## 2023-09-19 PROCEDURE — 76775 US EXAM ABDO BACK WALL LIM: CPT

## 2023-10-18 PROCEDURE — RXMED WILLOW AMBULATORY MEDICATION CHARGE: Performed by: INTERNAL MEDICINE

## 2023-10-19 ENCOUNTER — PHARMACY VISIT (OUTPATIENT)
Dept: PHARMACY | Facility: MEDICAL CENTER | Age: 73
End: 2023-10-19
Payer: MEDICARE

## 2023-11-02 ENCOUNTER — PATIENT MESSAGE (OUTPATIENT)
Dept: MEDICAL GROUP | Facility: MEDICAL CENTER | Age: 73
End: 2023-11-02
Payer: MEDICARE

## 2023-11-02 DIAGNOSIS — I15.9 SECONDARY HYPERTENSION: ICD-10-CM

## 2023-11-02 RX ORDER — LISINOPRIL 40 MG/1
40 TABLET ORAL DAILY
Qty: 90 TABLET | Refills: 1 | Status: SHIPPED | OUTPATIENT
Start: 2023-11-02

## 2023-11-02 RX ORDER — AMLODIPINE BESYLATE 10 MG/1
10 TABLET ORAL
Qty: 30 TABLET | Refills: 0 | Status: SHIPPED | OUTPATIENT
Start: 2023-11-02 | End: 2023-11-09

## 2023-11-02 NOTE — PATIENT COMMUNICATION
Received request via: Patient    Was the patient seen in the last year in this department? Yes    Does the patient have an active prescription (recently filled or refills available) for medication(s) requested? No    Does the patient have [facility] and need 100 day supply (blood pressure, diabetes and cholesterol meds only)? Patient does not have SCP  Requested Prescriptions     Pending Prescriptions Disp Refills    amLODIPine (NORVASC) 10 MG Tab 30 Tablet 0     Sig: Take 1 Tablet by mouth every day.

## 2023-11-09 DIAGNOSIS — I15.9 SECONDARY HYPERTENSION: ICD-10-CM

## 2023-11-09 RX ORDER — AMLODIPINE BESYLATE 5 MG/1
5 TABLET ORAL DAILY
Qty: 90 TABLET | Refills: 0 | Status: SHIPPED | OUTPATIENT
Start: 2023-11-09 | End: 2024-02-02 | Stop reason: SDUPTHER

## 2023-12-08 ENCOUNTER — OFFICE VISIT (OUTPATIENT)
Dept: MEDICAL GROUP | Facility: MEDICAL CENTER | Age: 73
End: 2023-12-08
Payer: MEDICARE

## 2023-12-08 ENCOUNTER — HOSPITAL ENCOUNTER (OUTPATIENT)
Dept: LAB | Facility: MEDICAL CENTER | Age: 73
End: 2023-12-08
Attending: NURSE PRACTITIONER
Payer: MEDICARE

## 2023-12-08 VITALS
HEART RATE: 60 BPM | OXYGEN SATURATION: 98 % | BODY MASS INDEX: 33.4 KG/M2 | DIASTOLIC BLOOD PRESSURE: 58 MMHG | TEMPERATURE: 98.2 F | WEIGHT: 212.8 LBS | HEIGHT: 67 IN | SYSTOLIC BLOOD PRESSURE: 104 MMHG

## 2023-12-08 DIAGNOSIS — D64.9 ANEMIA, UNSPECIFIED TYPE: ICD-10-CM

## 2023-12-08 DIAGNOSIS — Z12.5 SCREENING FOR PROSTATE CANCER: ICD-10-CM

## 2023-12-08 DIAGNOSIS — R74.8 ELEVATED ALKALINE PHOSPHATASE LEVEL: ICD-10-CM

## 2023-12-08 DIAGNOSIS — I15.9 SECONDARY HYPERTENSION: ICD-10-CM

## 2023-12-08 DIAGNOSIS — L70.9 ACNE, UNSPECIFIED ACNE TYPE: ICD-10-CM

## 2023-12-08 DIAGNOSIS — E78.5 HYPERLIPIDEMIA, UNSPECIFIED HYPERLIPIDEMIA TYPE: ICD-10-CM

## 2023-12-08 DIAGNOSIS — H91.93 DECREASED HEARING OF BOTH EARS: ICD-10-CM

## 2023-12-08 DIAGNOSIS — R56.9 SEIZURE-LIKE ACTIVITY (HCC): ICD-10-CM

## 2023-12-08 DIAGNOSIS — R35.1 BPH ASSOCIATED WITH NOCTURIA: ICD-10-CM

## 2023-12-08 DIAGNOSIS — N40.1 BPH ASSOCIATED WITH NOCTURIA: ICD-10-CM

## 2023-12-08 LAB
ALBUMIN SERPL BCP-MCNC: 4.1 G/DL (ref 3.2–4.9)
ALBUMIN/GLOB SERPL: 1.6 G/DL
ALP SERPL-CCNC: 143 U/L (ref 30–99)
ALT SERPL-CCNC: 19 U/L (ref 2–50)
ANION GAP SERPL CALC-SCNC: 10 MMOL/L (ref 7–16)
AST SERPL-CCNC: 18 U/L (ref 12–45)
BASOPHILS # BLD AUTO: 0.9 % (ref 0–1.8)
BASOPHILS # BLD: 0.05 K/UL (ref 0–0.12)
BILIRUB SERPL-MCNC: 0.8 MG/DL (ref 0.1–1.5)
BUN SERPL-MCNC: 16 MG/DL (ref 8–22)
CALCIUM ALBUM COR SERPL-MCNC: 9 MG/DL (ref 8.5–10.5)
CALCIUM SERPL-MCNC: 9.1 MG/DL (ref 8.4–10.2)
CHLORIDE SERPL-SCNC: 105 MMOL/L (ref 96–112)
CHOLEST SERPL-MCNC: 131 MG/DL (ref 100–199)
CO2 SERPL-SCNC: 25 MMOL/L (ref 20–33)
CREAT SERPL-MCNC: 0.9 MG/DL (ref 0.5–1.4)
EOSINOPHIL # BLD AUTO: 0.18 K/UL (ref 0–0.51)
EOSINOPHIL NFR BLD: 3.4 % (ref 0–6.9)
ERYTHROCYTE [DISTWIDTH] IN BLOOD BY AUTOMATED COUNT: 40.2 FL (ref 35.9–50)
FASTING STATUS PATIENT QL REPORTED: NORMAL
GFR SERPLBLD CREATININE-BSD FMLA CKD-EPI: 90 ML/MIN/1.73 M 2
GLOBULIN SER CALC-MCNC: 2.6 G/DL (ref 1.9–3.5)
GLUCOSE SERPL-MCNC: 98 MG/DL (ref 65–99)
HCT VFR BLD AUTO: 45.9 % (ref 42–52)
HDLC SERPL-MCNC: 39 MG/DL
HGB BLD-MCNC: 15.6 G/DL (ref 14–18)
IMM GRANULOCYTES # BLD AUTO: 0.02 K/UL (ref 0–0.11)
IMM GRANULOCYTES NFR BLD AUTO: 0.4 % (ref 0–0.9)
LDLC SERPL CALC-MCNC: 64 MG/DL
LYMPHOCYTES # BLD AUTO: 1.63 K/UL (ref 1–4.8)
LYMPHOCYTES NFR BLD: 30.4 % (ref 22–41)
MCH RBC QN AUTO: 29.8 PG (ref 27–33)
MCHC RBC AUTO-ENTMCNC: 34 G/DL (ref 32.3–36.5)
MCV RBC AUTO: 87.8 FL (ref 81.4–97.8)
MONOCYTES # BLD AUTO: 0.66 K/UL (ref 0–0.85)
MONOCYTES NFR BLD AUTO: 12.3 % (ref 0–13.4)
NEUTROPHILS # BLD AUTO: 2.82 K/UL (ref 1.82–7.42)
NEUTROPHILS NFR BLD: 52.6 % (ref 44–72)
NRBC # BLD AUTO: 0 K/UL
NRBC BLD-RTO: 0 /100 WBC (ref 0–0.2)
PLATELET # BLD AUTO: 223 K/UL (ref 164–446)
PMV BLD AUTO: 10.6 FL (ref 9–12.9)
POTASSIUM SERPL-SCNC: 3.9 MMOL/L (ref 3.6–5.5)
PROT SERPL-MCNC: 6.7 G/DL (ref 6–8.2)
RBC # BLD AUTO: 5.23 M/UL (ref 4.7–6.1)
SODIUM SERPL-SCNC: 140 MMOL/L (ref 135–145)
TRIGL SERPL-MCNC: 141 MG/DL (ref 0–149)
WBC # BLD AUTO: 5.4 K/UL (ref 4.8–10.8)

## 2023-12-08 PROCEDURE — 3074F SYST BP LT 130 MM HG: CPT | Performed by: NURSE PRACTITIONER

## 2023-12-08 PROCEDURE — 84153 ASSAY OF PSA TOTAL: CPT | Mod: GA

## 2023-12-08 PROCEDURE — 80061 LIPID PANEL: CPT

## 2023-12-08 PROCEDURE — 85025 COMPLETE CBC W/AUTO DIFF WBC: CPT

## 2023-12-08 PROCEDURE — 80053 COMPREHEN METABOLIC PANEL: CPT

## 2023-12-08 PROCEDURE — 3078F DIAST BP <80 MM HG: CPT | Performed by: NURSE PRACTITIONER

## 2023-12-08 PROCEDURE — 99214 OFFICE O/P EST MOD 30 MIN: CPT | Performed by: NURSE PRACTITIONER

## 2023-12-08 PROCEDURE — 36415 COLL VENOUS BLD VENIPUNCTURE: CPT

## 2023-12-08 PROCEDURE — 84154 ASSAY OF PSA FREE: CPT

## 2023-12-08 PROCEDURE — 84080 ASSAY ALKALINE PHOSPHATASES: CPT

## 2023-12-08 PROCEDURE — 84075 ASSAY ALKALINE PHOSPHATASE: CPT

## 2023-12-08 RX ORDER — CLINDAMYCIN PHOSPHATE 10 MG/G
1 GEL TOPICAL DAILY
Qty: 30 G | Refills: 0 | Status: SHIPPED | OUTPATIENT
Start: 2023-12-08 | End: 2024-01-05

## 2023-12-08 RX ORDER — TAMSULOSIN HYDROCHLORIDE 0.4 MG/1
0.4 CAPSULE ORAL DAILY
Qty: 90 CAPSULE | Refills: 1 | Status: SHIPPED | OUTPATIENT
Start: 2023-12-08

## 2023-12-08 ASSESSMENT — FIBROSIS 4 INDEX: FIB4 SCORE: 1.76

## 2023-12-08 NOTE — PROGRESS NOTES
"Chief Complaint   Patient presents with    Follow-Up       HISTORY OF PRESENT ILLNESS: Patient is a 73 y.o. male, established patient who presents today to discuss medical problems as listed below:    Health Maintenance:  COMPLETED       Allergies: Patient has no known allergies.    Current Outpatient Medications   Medication Sig Dispense Refill    tamsulosin (FLOMAX) 0.4 MG capsule Take 1 Capsule by mouth every day. 90 Capsule 1    clindamycin 1 % Gel Apply 1 Application topically every day. Do not exceed duration of therapy over 12 weeks to avoid antibiotic resistance. 30 g 0    amLODIPine (NORVASC) 5 MG Tab Take 1 Tablet by mouth every day. 90 Tablet 0    lisinopril (PRINIVIL) 40 MG tablet Take 1 Tablet by mouth every day. 90 Tablet 1    levETIRAcetam (KEPPRA) 500 MG Tab Take 1 Tablet by mouth 2 times a day. 180 Tablet 3    atorvastatin (LIPITOR) 40 MG Tab TAKE 1 TABLET BY MOUTH EVERYDAY AT BEDTIME 90 Tablet 1     No current facility-administered medications for this visit.       Allergies, past medical history, past surgical history, family history, social history reviewed and updated.    Review of Systems:     - Constitutional: Negative for fever, chills, unexpected weight change, and fatigue/generalized weakness.      - Respiratory: Negative for cough, sputum production, chest congestion, dyspnea, wheezing, and crackles.      - Cardiovascular: Negative for chest pain, palpitations, orthopnea, and bilateral lower extremity edema.       - Skin: face acne    - Psychiatric/Behavioral: Negative for depression, suicidal/homicidal ideation and memory loss.      All other systems reviewed and are negative    Exam:    /58 (BP Location: Left arm, Patient Position: Sitting, BP Cuff Size: Adult)   Pulse 60   Temp 36.8 °C (98.2 °F) (Temporal)   Ht 1.702 m (5' 7\")   Wt 96.5 kg (212 lb 12.8 oz)   SpO2 98%   BMI 33.33 kg/m²  Body mass index is 33.33 kg/m².    Physical Exam:  Constitutional: Well-developed and " well-nourished. Not diaphoretic. No distress.   Skin: face acne  Cardiovascular: Regular rate and rhythm, S1 and S2 without murmur, rubs, or gallops.    Chest: Effort normal. Clear to auscultation throughout. No adventitious sounds. Neurological: Alert and oriented x 3.   Psychiatric:  Behavior, mood, and affect are appropriate.  MA/nursing note and vitals reviewed.    Assessment/Plan:  BPH associated with nocturia  Chronic condition. On FLomax 0.4 mg daily.   Has a follow up with Dr. Cook, urolofy next week.   Symptoms are well controlled.     Seizure-like activity (HCC)  Chronic condition. History of sz, 2 recent episodes , following with neurology Dr. Delgado , on Keppra.   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     Decreased hearing of both ears  New problem. Pt states decreased hearing only in a setting of crowded environment. Wife would like a hearing test.    Acne  New problem. Acne spots on face and neck in the last few month.     1. Screening for prostate cancer  - PSA TOTAL + %FREE; Future    2. BPH associated with nocturia  Stable on current regimen.  Continue.  Refills given   - tamsulosin (FLOMAX) 0.4 MG capsule; Take 1 Capsule by mouth every day.  Dispense: 90 Capsule; Refill: 1    3. Seizure-like activity (HCC)  Stable, following with neurology    4. Decreased hearing of both ears  - Referral to ENT    5. Acne, unspecified acne type  Uncontrolled, stable, trial of clindamycin  - clindamycin 1 % Gel; Apply 1 Application topically every day. Do not exceed duration of therapy over 12 weeks to avoid antibiotic resistance.  Dispense: 30 g; Refill: 0      Discussed with patient possible alternative diagnoses, patient is to take all medications as prescribed.      If symptoms persist FU w/PCP, if symptoms worsen go to emergency room.      If experiencing any side effects from prescribed medications report to the office immediately or go to emergency room.     Reviewed  indication, dosage, usage and potential adverse effects of prescribed medications.      Reviewed risks and benefits of treatment plan. Patient verbalizes understanding of all instruction and verbally agrees to plan.     Discussed plan with the patient, and patient agrees to the above.      I personally reviewed prior external notes and test results pertinent to today's visit.      No follow-ups on file. 3 mos

## 2023-12-08 NOTE — ASSESSMENT & PLAN NOTE
Chronic condition. On FLomax 0.4 mg daily.   Has a follow up with leana Garsia next week.   Symptoms are well controlled.

## 2023-12-08 NOTE — ASSESSMENT & PLAN NOTE
Chronic condition. History of sz, 2 recent episodes , following with neurology Dr. Delgado , on Keppra.   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

## 2023-12-08 NOTE — ASSESSMENT & PLAN NOTE
New problem. Pt states decreased hearing only in a setting of crowded environment. Wife would like a hearing test.

## 2023-12-10 LAB
PSA FREE MFR SERPL: 21 %
PSA FREE SERPL-MCNC: 0.8 NG/ML
PSA SERPL-MCNC: 3.9 NG/ML (ref 0–4)

## 2023-12-12 LAB
ALP BONE SERPL-CCNC: 50 U/L (ref 0–55)
ALP ISOS SERPL HS-CCNC: 0 U/L
ALP LIVER SERPL-CCNC: 93 U/L (ref 0–94)
ALP SERPL-CCNC: 143 U/L (ref 40–120)

## 2024-01-01 NOTE — ASSESSMENT & PLAN NOTE
Chronic intermittent problem, epigastric tenderness.  1 cup of coffee daily.  Denies nausea, blood in stool, no vomiting.  Has tried Pepto-Bismol without improvement.  
Chronic problem.  Continues to have BPH symptoms including nocturnal frequency, postvoid dribbling.  Denies any fevers, flank pain or back pain.  He is on Flomax 0.4 mg for years.  Symptoms increased.  His PSA was 3.08 from 10 months ago, a year prior to that was 2.66.  
Patient is here to follow-up on his hospital admission on 10/21/2022.  Patient woke up screaming, was found by wife to be confused and altered and had a syncopal event.  He was brought to the [facility] by Manav, he was found to be hypoxic and was placed on 4 L of oxygen.  CT of the head as well as an MRI were negative.  He had no focal weakness, no slurred speech.  He was followed on telemetry and EEG was in place, without events.  He was discharged in stable condition.  He was negative for COVID and flu.  He was found to be D satting down to 70s nocturnally due to sleep apnea.  Today he is satting at 96% on room air at rest.  He was discharged on oxygen nocturnally, 2 L.  According to wife feeling a little better in terms of orientation and cognitive function.  He has no pulse oximeter.  He has an appointment with sleep medicine on 11/8/2022, Dr. Hendrickson.  
-Fewer than 5 wet diapers per day

## 2024-01-05 DIAGNOSIS — L70.9 ACNE, UNSPECIFIED ACNE TYPE: ICD-10-CM

## 2024-01-05 RX ORDER — CLINDAMYCIN PHOSPHATE 10 MG/G
1 GEL TOPICAL DAILY
Qty: 30 G | Refills: 0 | Status: SHIPPED | OUTPATIENT
Start: 2024-01-05 | End: 2024-02-06

## 2024-01-27 DIAGNOSIS — E78.5 HYPERLIPIDEMIA, UNSPECIFIED HYPERLIPIDEMIA TYPE: ICD-10-CM

## 2024-01-30 RX ORDER — ATORVASTATIN CALCIUM 40 MG/1
TABLET, FILM COATED ORAL
Qty: 90 TABLET | Refills: 1 | Status: SHIPPED | OUTPATIENT
Start: 2024-01-30

## 2024-01-30 NOTE — TELEPHONE ENCOUNTER
Received request via: Patient    Was the patient seen in the last year in this department? Yes    Does the patient have an active prescription (recently filled or refills available) for medication(s) requested? No    [facility]    Does the patient have skilled [facility] and need 100 day supply (blood pressure, diabetes and cholesterol meds only)? Patient does not have SCP

## 2024-02-02 ENCOUNTER — OFFICE VISIT (OUTPATIENT)
Dept: MEDICAL GROUP | Facility: MEDICAL CENTER | Age: 74
End: 2024-02-02
Payer: MEDICARE

## 2024-02-02 VITALS
WEIGHT: 216 LBS | HEIGHT: 67 IN | HEART RATE: 69 BPM | TEMPERATURE: 98.3 F | OXYGEN SATURATION: 95 % | DIASTOLIC BLOOD PRESSURE: 62 MMHG | BODY MASS INDEX: 33.9 KG/M2 | SYSTOLIC BLOOD PRESSURE: 118 MMHG

## 2024-02-02 DIAGNOSIS — Z00.00 MEDICARE ANNUAL WELLNESS VISIT, SUBSEQUENT: ICD-10-CM

## 2024-02-02 DIAGNOSIS — Z12.11 SCREEN FOR COLON CANCER: ICD-10-CM

## 2024-02-02 DIAGNOSIS — G40.909 SEIZURE DISORDER (HCC): ICD-10-CM

## 2024-02-02 DIAGNOSIS — I70.0 AORTIC CALCIFICATION (HCC): ICD-10-CM

## 2024-02-02 DIAGNOSIS — R63.5 WEIGHT GAIN: ICD-10-CM

## 2024-02-02 DIAGNOSIS — I15.9 SECONDARY HYPERTENSION: ICD-10-CM

## 2024-02-02 DIAGNOSIS — R56.9 SEIZURE-LIKE ACTIVITY (HCC): ICD-10-CM

## 2024-02-02 PROCEDURE — 3074F SYST BP LT 130 MM HG: CPT | Performed by: NURSE PRACTITIONER

## 2024-02-02 PROCEDURE — 3078F DIAST BP <80 MM HG: CPT | Performed by: NURSE PRACTITIONER

## 2024-02-02 PROCEDURE — 99213 OFFICE O/P EST LOW 20 MIN: CPT | Mod: 25 | Performed by: NURSE PRACTITIONER

## 2024-02-02 PROCEDURE — G0439 PPPS, SUBSEQ VISIT: HCPCS | Performed by: NURSE PRACTITIONER

## 2024-02-02 ASSESSMENT — FIBROSIS 4 INDEX: FIB4 SCORE: 1.33

## 2024-02-02 ASSESSMENT — PATIENT HEALTH QUESTIONNAIRE - PHQ9: CLINICAL INTERPRETATION OF PHQ2 SCORE: 0

## 2024-02-02 ASSESSMENT — ENCOUNTER SYMPTOMS: GENERAL WELL-BEING: GOOD

## 2024-02-02 ASSESSMENT — ACTIVITIES OF DAILY LIVING (ADL): BATHING_REQUIRES_ASSISTANCE: 0

## 2024-02-02 NOTE — PROGRESS NOTES
Chief Complaint   Patient presents with    Annual Exam     awv       HPI:  Ravindra Raines is a 73 y.o. here for Medicare Annual Wellness Visit     Patient Active Problem List    Diagnosis Date Noted    Decreased hearing of both ears 12/08/2023    Acne 12/08/2023    Elevated alkaline phosphatase level 08/11/2023    Morbid (severe) obesity due to excess calories (HCC) 05/11/2023    Body mass index (BMI) 38.0-38.9, adult 05/11/2023    Seizure disorder (HCC) 04/18/2023    Sleep apnea 04/18/2023    Injury of tongue 02/14/2023    Nasal congestion 12/19/2022    Hypertrophy of nasal turbinates 12/19/2022    Chronic pansinusitis 12/19/2022    Chronic right-sided thoracic back pain 12/07/2022    Hospital discharge follow-up 10/26/2022    Gastroesophageal reflux disease without esophagitis 10/26/2022    Benign prostatic hyperplasia with urinary frequency 10/26/2022    Acute metabolic encephalopathy 10/21/2022    Seizure-like activity (HCC) 10/21/2022    Aortic calcification (HCC) 10/20/2022    Syncopal episodes 07/01/2022    Callus of heel 11/19/2021    Snoring 02/12/2021    Mitral valve insufficiency 01/08/2021    Cardiac murmur 12/09/2020    Elevated hemoglobin A1c 11/13/2019    Seasonal allergic rhinitis 11/13/2019    BPH associated with nocturia 10/24/2019    Sacral back pain 10/24/2019    Hyperlipidemia 10/24/2019    HTN (hypertension) 10/24/2019    Vitamin D deficiency 10/24/2019       Current Outpatient Medications   Medication Sig Dispense Refill    atorvastatin (LIPITOR) 40 MG Tab TAKE 1 TABLET BY MOUTH EVERYDAY AT BEDTIME 90 Tablet 1    clindamycin 1 % Gel APPLY 1 APPLICATION TOPICALLY EVERY DAY. DO NOT EXCEED DURATION OF THERAPY OVER 12 WEEKS TO AVOID ANTIBIOTIC RESISTANCE. 30 g 0    tamsulosin (FLOMAX) 0.4 MG capsule Take 1 Capsule by mouth every day. 90 Capsule 1    amLODIPine (NORVASC) 5 MG Tab Take 1 Tablet by mouth every day. 90 Tablet 0    lisinopril (PRINIVIL) 40 MG tablet Take 1 Tablet by mouth every  day. 90 Tablet 1    levETIRAcetam (KEPPRA) 500 MG Tab Take 1 Tablet by mouth 2 times a day. 180 Tablet 3     No current facility-administered medications for this visit.          Current supplements as per medication list.     Allergies: Patient has no known allergies.    Current social contact/activities: ***     He  reports that he quit smoking about 45 years ago. His smoking use included cigarettes. He started smoking about 54 years ago. He has never used smokeless tobacco. He reports that he does not currently use alcohol. He reports that he does not use drugs.  Counseling given: Not Answered  Tobacco comments: 0.5 x 5 yrs, stopped at age 26      ROS:    Gait: Uses no assistive device  Ostomy: No  Other tubes: No  Amputations: No  Chronic oxygen use: No  Last eye exam: [id number]  Wears hearing aids: No   : Denies any urinary leakage during the last 6 months    Screening:  ***  Depression Screening  Little interest or pleasure in doing things?     Feeling down, depressed , or hopeless?    Trouble falling or staying asleep, or sleeping too much?     Feeling tired or having little energy?     Poor appetite or overeating?     Feeling bad about yourself - or that you are a failure or have let yourself or your family down?    Trouble concentrating on things, such as reading the newspaper or watching television?    Moving or speaking so slowly that other people could have noticed.  Or the opposite - being so fidgety or restless that you have been moving around a lot more than usual?     Thoughts that you would be better off dead, or of hurting yourself?     Patient Health Questionnaire Score:      If depressive symptoms identified deferred to follow up visit unless specifically addressed in assessment and plan.    Interpretation of PHQ-9 Total Score   Score Severity   1-4 No Depression   5-9 Mild Depression   10-14 Moderate Depression   15-19 Moderately Severe Depression   20-27 Severe Depression    Screening for  Cognitive Impairment  Do you or any of your friends or family members have any concern about your memory?    Three Minute Recall (Banana, Sunrise, Chair)  /3    Zay clock face with all 12 numbers and set the hands to show 20 past 8.       Cognitive concerns identified deferred for follow up unless specifically addressed in assessment and plan.    Fall Risk Assessment  Has the patient had two or more falls in the last year or any fall with injury in the last year?       Safety Assessment  Do you always wear your seatbelt?     Any changes to home needed to function safely?    Difficulty hearing.     Patient counseled about all safety risks that were identified.    Functional Assessment ADLs  Are there any barriers preventing you from cooking for yourself or meeting nutritional needs?   .    Are there any barriers preventing you from driving safely or obtaining transportation?   .    Are there any barriers preventing you from using a telephone or calling for help?       Are there any barriers preventing you from shopping?   .    Are there any barriers preventing you from taking care of your own finances?       Are there any barriers preventing you from managing your medications?       Are there any barriers preventing you from showering, bathing or dressing yourself?      Are there any barriers preventing you from doing housework or laundry?      Are there any barriers preventing you from using the toilet?     Are you currently engaging in any exercise or physical activity?   .      Self-Assessment of Health  What is your perception of your health?      Do you sleep more than six hours a night?      In the past 7 days, how much did pain keep you from doing your normal work?      Do you spend quality time with family or friends (virtually or in person)?      Do you usually eat a heart healthy diet that constists of a variety of fruits, vegetables, whole grains and fiber?      Do you eat foods high in fat and/or Fast  Food more than three times per week?      How concerned are you that your medical conditions are not being well managed?      Are you worried that in the next 2 months, you may not have stable housing that you own, rent, or stay in as part of a household?          Advance Care Planning  Do you have an Advance Directive, Living Will, Durable Power of , or POLST?                   Health Maintenance Summary            Ordered - Colorectal Cancer Screening (Colonoscopy - Every 10 Years) Ordered on 8/11/2023      No completion history exists for this topic.              Overdue - COVID-19 Vaccine (3 - 2023-24 season) Overdue since 9/1/2023 04/04/2021  Imm Admin: MODERNA SARS-COV-2 VACCINE (12+)    03/07/2021  Imm Admin: MODERNA SARS-COV-2 VACCINE (12+)              Overdue - Annual Wellness Visit (Yearly) Overdue since 12/6/2023 12/06/2022  Visit Dx: Medicare annual wellness visit, subsequent    12/09/2020  Level of Service: [place] PREVENTIVE VISIT,EST,65 & OVER              IMM DTaP/Tdap/Td Vaccine (2 - Td or Tdap) Next due on 12/9/2030 12/09/2020  Imm Admin: Tdap Vaccine              Hepatitis C Screening  Tentatively Complete      11/06/2019  Hepatitis C Antibody component of Hep C Virus Antibody              Pneumococcal Vaccine: 65+ Years (Series Information) Completed      12/07/2022  Imm Admin: Pneumococcal Conjugate Vaccine (PCV20)    03/11/2020  Imm Admin: Pneumococcal Conjugate Vaccine (Prevnar/PCV-13)              Influenza Vaccine (Series Information) Completed      09/15/2023  Outside Immunization: Influenza Quad Adjuvanted    09/27/2022  Imm Admin: Influenza Vaccine, Quadrivalent, Adjuvanted (Pf)    09/26/2022  Imm Admin: Influenza, Unspecified - HISTORICAL DATA    11/19/2021  Imm Admin: Influenza Vaccine Adult HD    10/20/2020  Imm Admin: Influenza Vaccine Adult HD    Only the first 5 history entries have been loaded, but more history exists.              Abdominal Aortic Aneurysm  (AAA) Screening  Completed      2023  US-ABDOMINAL AORTA W/O DOPPLER              Zoster (Shingles) Vaccines (Series Information) Completed      10/19/2023  Imm Admin: Zoster Vaccine Recombinant (RZV) (SHINGRIX)    2020  Imm Admin: Zoster Vaccine Recombinant (RZV) (SHINGRIX)              Hepatitis A Vaccine (Hep A) (Series Information) Aged Out      No completion history exists for this topic.              Hepatitis B Vaccine (Hep B) (Series Information) Aged Out      No completion history exists for this topic.              HPV Vaccines (Series Information) Aged Out      No completion history exists for this topic.              Polio Vaccine (Inactivated Polio) (Series Information) Aged Out      No completion history exists for this topic.              Meningococcal Immunization (Series Information) Aged Out      No completion history exists for this topic.                    Patient Care Team:  BILL Butterfield as PCP - General (Family Medicine)  Yovani Barreto, PT, DPT as Physical Therapist (Physical Therapy)  Preferred home care  [facility] (DME Supplier)  BILL Dowell (Nurse Practitioner Family)  BILL Ramirez (Nurse Practitioner Family)  Dale Avelar D.P.M. (Podiatry)      Social History     Tobacco Use    Smoking status: Former     Current packs/day: 0.00     Types: Cigarettes     Start date: 1970     Quit date: 1979     Years since quittin.0    Smokeless tobacco: Never    Tobacco comments:     0.5 x 5 yrs, stopped at age 26   Vaping Use    Vaping Use: Never used   Substance Use Topics    Alcohol use: Not Currently     Comment: occ    Drug use: Never     Family History   Problem Relation Age of Onset    Cancer Mother 40        br ca    Heart Disease Mother     Cancer Father     Cancer Brother 61        brain ca     He  has a past medical history of Apnea, sleep, Arrhythmia (2021), BPH associated with nocturia, Chickenpox, Frequent  urination, Tajik measles, Snoring, and Wears glasses.    He has no past medical history of Restless leg syndrome.   Past Surgical History:   Procedure Laterality Date    APPENDECTOMY  1990    TONSILLECTOMY         Exam:   There were no vitals taken for this visit. There is no height or weight on file to calculate BMI.    Hearing {GOOD/FAIR/POOR/EXCELLENT:48174}.    Dentition {DENTITION:51152}  Alert, oriented in no acute distress.  Eye contact is good, speech goal directed, affect calm    Assessment and Plan. The following treatment and monitoring plan is recommended:  ***  There are no diagnoses linked to this encounter.    Services suggested: { AWV COORDINATION OF SERVICES:20405}  Health Care Screening: Age-appropriate preventive services recommended by USPTF and ACIP covered by Medicare were discussed today. Services ordered if indicated and agreed upon by the patient.  Referrals offered: Community-based lifestyle interventions to reduce health risks and promote self-management and wellness, fall prevention, nutrition, physical activity, tobacco-use cessation, weight loss, and mental health services as per orders if indicated.    Discussion today about general wellness and lifestyle habits:    Prevent falls and reduce trip hazards; Cautioned about securing or removing rugs.  Have a working fire alarm and carbon monoxide detector;   Engage in regular physical activity and social activities     Follow-up: No follow-ups on file.

## 2024-02-02 NOTE — PROGRESS NOTES
Chief Complaint   Patient presents with    Annual Exam     awv     Aortic calcification (HCC)  Noted on ECHO in 2022.   Aortic Valve  Structurally normal aortic valve without significant stenosis. Mild   aortic insufficiency.     BP is well controlled, lipid panel WNL, on Lipitor.    Seizure-like activity (HCC)  Chronic condition. History of sz, 2 recent episodes , following with neurology Dr. Delgado , on Keppra.   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    Weight gain  New problem.   Weight gain over the years,   currently at 216 lbs  Goal wt: 190 lbs.  Not interested in Rx management.  Would like a ref to RD.   HPI:  Ravindra Raines is a 73 y.o. here for Medicare Annual Wellness Visit     Patient Active Problem List    Diagnosis Date Noted    Decreased hearing of both ears 12/08/2023    Acne 12/08/2023    Elevated alkaline phosphatase level 08/11/2023    Morbid (severe) obesity due to excess calories (HCC) 05/11/2023    Body mass index (BMI) 38.0-38.9, adult 05/11/2023    Seizure disorder (HCC) 04/18/2023    Sleep apnea 04/18/2023    Injury of tongue 02/14/2023    Nasal congestion 12/19/2022    Hypertrophy of nasal turbinates 12/19/2022    Chronic pansinusitis 12/19/2022    Chronic right-sided thoracic back pain 12/07/2022    Hospital discharge follow-up 10/26/2022    Gastroesophageal reflux disease without esophagitis 10/26/2022    Benign prostatic hyperplasia with urinary frequency 10/26/2022    Acute metabolic encephalopathy 10/21/2022    Seizure-like activity (HCC) 10/21/2022    Aortic calcification (HCC) 10/20/2022    Syncopal episodes 07/01/2022    Callus of heel 11/19/2021    Snoring 02/12/2021    Mitral valve insufficiency 01/08/2021    Cardiac murmur 12/09/2020    Elevated hemoglobin A1c 11/13/2019    Seasonal allergic rhinitis 11/13/2019    BPH associated with nocturia 10/24/2019    Sacral back pain 10/24/2019    Hyperlipidemia 10/24/2019    HTN (hypertension) 10/24/2019     Vitamin D deficiency 10/24/2019       Current Outpatient Medications   Medication Sig Dispense Refill    atorvastatin (LIPITOR) 40 MG Tab TAKE 1 TABLET BY MOUTH EVERYDAY AT BEDTIME 90 Tablet 1    clindamycin 1 % Gel APPLY 1 APPLICATION TOPICALLY EVERY DAY. DO NOT EXCEED DURATION OF THERAPY OVER 12 WEEKS TO AVOID ANTIBIOTIC RESISTANCE. 30 g 0    tamsulosin (FLOMAX) 0.4 MG capsule Take 1 Capsule by mouth every day. 90 Capsule 1    amLODIPine (NORVASC) 5 MG Tab Take 1 Tablet by mouth every day. 90 Tablet 0    lisinopril (PRINIVIL) 40 MG tablet Take 1 Tablet by mouth every day. 90 Tablet 1    levETIRAcetam (KEPPRA) 500 MG Tab Take 1 Tablet by mouth 2 times a day. 180 Tablet 3     No current facility-administered medications for this visit.          Current supplements as per medication list.     Allergies: Patient has no known allergies.    Current social contact/activities: pt is , friends and family in town.     He  reports that he quit smoking about 45 years ago. His smoking use included cigarettes. He started smoking about 54 years ago. He has never used smokeless tobacco. He reports that he does not currently use alcohol. He reports that he does not use drugs.  Counseling given: Not Answered  Tobacco comments: 0.5 x 5 yrs, stopped at age 26      ROS:    Gait: Uses no assistive device  Ostomy: No  Other tubes: No  Amputations: No  Chronic oxygen use: No  Last eye exam: 9293-3168  Wears hearing aids: No   : Denies any urinary leakage during the last 6 months    Screening:    Depression Screening  Little interest or pleasure in doing things?  no   Feeling down, depressed , or hopeless?  no  Trouble falling or staying asleep, or sleeping too much?  no   Feeling tired or having little energy? no    Poor appetite or overeating?   no  Feeling bad about yourself - or that you are a failure or have let yourself or your family down?  no  Trouble concentrating on things, such as reading the newspaper or watching  television?  no  Moving or speaking so slowly that other people could have noticed.  Or the opposite - being so fidgety or restless that you have been moving around a lot more than usual?   no  Thoughts that you would be better off dead, or of hurting yourself?   no  Patient Health Questionnaire Score:  0    If depressive symptoms identified deferred to follow up visit unless specifically addressed in assessment and plan.    Interpretation of PHQ-9 Total Score   Score Severity   1-4 No Depression   5-9 Mild Depression   10-14 Moderate Depression   15-19 Moderately Severe Depression   20-27 Severe Depression    Screening for Cognitive Impairment  Do you or any of your friends or family members have any concern about your memory?  no  Three Minute Recall (Banana, Sunrise, Chair) 2 /3    Zay clock face with all 12 numbers and set the hands to show 20 past 8.       Cognitive concerns identified deferred for follow up unless specifically addressed in assessment and plan.    Fall Risk Assessment  Has the patient had two or more falls in the last year or any fall with injury in the last year?   no    Safety Assessment  Do you always wear your seatbelt? yes    Any changes to home needed to function safely? no   Difficulty hearing.   no  Patient counseled about all safety risks that were identified.    Functional Assessment ADLs  Are there any barriers preventing you from cooking for yourself or meeting nutritional needs?   .no    Are there any barriers preventing you from driving safely or obtaining transportation?   .  no  Are there any barriers preventing you from using a telephone or calling for help?   no    Are there any barriers preventing you from shopping?   .  no  Are there any barriers preventing you from taking care of your own finances?  no     Are there any barriers preventing you from managing your medications?     no  Are there any barriers preventing you from showering, bathing or dressing yourself?     no  Are there any barriers preventing you from doing housework or laundry?    no  Are there any barriers preventing you from using the toilet?   no  Are you currently engaging in any exercise or physical activity? no  .      Self-Assessment of Health  What is your perception of your health? good      Do you sleep more than six hours a night?    yes  In the past 7 days, how much did pain keep you from doing your normal work?  none    Do you spend quality time with family or friends (virtually or in person)?   yes   Do you usually eat a heart healthy diet that constists of a variety of fruits, vegetables, whole grains and fiber?    yes  Do you eat foods high in fat and/or Fast Food more than three times per week? no     How concerned are you that your medical conditions are not being well managed?    not  Are you worried that in the next 2 months, you may not have stable housing that you own, rent, or stay in as part of a household? no         Advance Care Planning  Do you have an Advance Directive, Living Will, Durable Power of , or POLST?  No, was given to pt to complete                 Health Maintenance Summary            Ordered - Colorectal Cancer Screening (Colonoscopy - Every 10 Years) Ordered on 8/11/2023      No completion history exists for this topic.              Overdue - COVID-19 Vaccine (3 - 2023-24 season) Overdue since 9/1/2023 04/04/2021  Imm Admin: MODERNA SARS-COV-2 VACCINE (12+)    03/07/2021  Imm Admin: MODERNA SARS-COV-2 VACCINE (12+)              Overdue - Annual Wellness Visit (Yearly) Overdue since 12/6/2023 12/06/2022  Visit Dx: Medicare annual wellness visit, subsequent    12/09/2020  Level of Service: [place] PREVENTIVE VISIT,EST,65 & OVER              IMM DTaP/Tdap/Td Vaccine (2 - Td or Tdap) Next due on 12/9/2030 12/09/2020  Imm Admin: Tdap Vaccine              Hepatitis C Screening  Tentatively Complete      11/06/2019  Hepatitis C Antibody component of Hep C Virus  Antibody              Pneumococcal Vaccine: 65+ Years (Series Information) Completed      12/07/2022  Imm Admin: Pneumococcal Conjugate Vaccine (PCV20)    03/11/2020  Imm Admin: Pneumococcal Conjugate Vaccine (Prevnar/PCV-13)              Influenza Vaccine (Series Information) Completed      09/15/2023  Outside Immunization: Influenza Quad Adjuvanted    09/27/2022  Imm Admin: Influenza Vaccine, Quadrivalent, Adjuvanted (Pf)    09/26/2022  Imm Admin: Influenza, Unspecified - HISTORICAL DATA    11/19/2021  Imm Admin: Influenza Vaccine Adult HD    10/20/2020  Imm Admin: Influenza Vaccine Adult HD    Only the first 5 history entries have been loaded, but more history exists.              Abdominal Aortic Aneurysm (AAA) Screening  Completed      09/19/2023  US-ABDOMINAL AORTA W/O DOPPLER              Zoster (Shingles) Vaccines (Series Information) Completed      10/19/2023  Imm Admin: Zoster Vaccine Recombinant (RZV) (SHINGRIX)    12/09/2020  Imm Admin: Zoster Vaccine Recombinant (RZV) (SHINGRIX)              Hepatitis A Vaccine (Hep A) (Series Information) Aged Out      No completion history exists for this topic.              Hepatitis B Vaccine (Hep B) (Series Information) Aged Out      No completion history exists for this topic.              HPV Vaccines (Series Information) Aged Out      No completion history exists for this topic.              Polio Vaccine (Inactivated Polio) (Series Information) Aged Out      No completion history exists for this topic.              Meningococcal Immunization (Series Information) Aged Out      No completion history exists for this topic.                    Patient Care Team:  BILL Butterfield as PCP - General (Family Medicine)  Yovani Barreto, PT, DPT as Physical Therapist (Physical Therapy)  Preferred home care  [facility] (DME Supplier)  BILL Dowell (Nurse Practitioner Family)  BILL Ramirez (Nurse Practitioner Family)  Dale SANCHEZ  DOMENICA Avelar (Podiatry)      Social History     Tobacco Use    Smoking status: Former     Current packs/day: 0.00     Types: Cigarettes     Start date: 1970     Quit date: 1979     Years since quittin.0    Smokeless tobacco: Never    Tobacco comments:     0.5 x 5 yrs, stopped at age 26   Vaping Use    Vaping Use: Never used   Substance Use Topics    Alcohol use: Not Currently     Comment: occ    Drug use: Never     Family History   Problem Relation Age of Onset    Cancer Mother 40        br ca    Heart Disease Mother     Cancer Father     Cancer Brother 61        brain ca     He  has a past medical history of Apnea, sleep, Arrhythmia (2021), BPH associated with nocturia, Chickenpox, Frequent urination, [place] measles, Snoring, and Wears glasses.    He has no past medical history of Restless leg syndrome.   Past Surgical History:   Procedure Laterality Date    APPENDECTOMY      TONSILLECTOMY         Exam:   There were no vitals taken for this visit. There is no height or weight on file to calculate BMI.    Hearing good.    Dentition good  Alert, oriented in no acute distress.  Eye contact is good, speech goal directed, affect calm    Assessment and Plan. The following treatment and monitoring plan is recommended:    There are no diagnoses linked to this encounter.  1. Medicare annual wellness visit, subsequent    2. Aortic calcification (HCC)  Stable with current regimen.  Continue Lipitor.    3. Seizure-like activity (HCC)  Stable on current regimen.  Patient is following up with neurology.  Continue Keppra.    4. Seizure disorder (HCC)  As discussed in 3    5. Screen for colon cancer  - Referral to GI for Colonoscopy    6. Weight gain  Uncontrolled, stable.  Discussed importance of adequate hydration and minimizing simple carbohydrates including alcohol and simple sugars.  Focusing on quality of foods such as healthy balanced and proteins fats and fiber.  Patient will be following up with  registered dietitian.  Referral placed.   - referral to nutritionist     Services suggested: No services needed at this time  Health Care Screening: Age-appropriate preventive services recommended by USPTF and ACIP covered by Medicare were discussed today. Services ordered if indicated and agreed upon by the patient.  Referrals offered: Community-based lifestyle interventions to reduce health risks and promote self-management and wellness, fall prevention, nutrition, physical activity, tobacco-use cessation, weight loss, and mental health services as per orders if indicated.    Discussion today about general wellness and lifestyle habits:    Prevent falls and reduce trip hazards; Cautioned about securing or removing rugs.  Have a working fire alarm and carbon monoxide detector;   Engage in regular physical activity and social activities     Follow-up: No follow-ups on file.

## 2024-02-02 NOTE — ASSESSMENT & PLAN NOTE
New problem.   Weight gain over the years,   currently at 216 lbs  Goal wt: 190 lbs.  Not interested in Rx management.  Would like a ref to RD.

## 2024-02-03 DIAGNOSIS — L70.9 ACNE, UNSPECIFIED ACNE TYPE: ICD-10-CM

## 2024-02-05 DIAGNOSIS — I15.9 SECONDARY HYPERTENSION: ICD-10-CM

## 2024-02-05 NOTE — TELEPHONE ENCOUNTER
Received request via: Pharmacy    Was the patient seen in the last year in this department? Yes    Does the patient have an active prescription (recently filled or refills available) for medication(s) requested? No    [facility]    Does the patient have FDC Plus and need 100 day supply (blood pressure, diabetes and cholesterol meds only)? Patient does not have SCP

## 2024-02-05 NOTE — TELEPHONE ENCOUNTER
Received request via: Pharmacy    Was the patient seen in the last year in this department? Yes    Does the patient have an active prescription (recently filled or refills available) for medication(s) requested? No    [facility]    Does the patient have residential Plus and need 100 day supply (blood pressure, diabetes and cholesterol meds only)? Patient does not have SCP

## 2024-02-05 NOTE — TELEPHONE ENCOUNTER
Received request via: Pharmacy    Was the patient seen in the last year in this department? Yes    Does the patient have an active prescription (recently filled or refills available) for medication(s) requested? No    [facility]    Does the patient have nursing home Plus and need 100 day supply (blood pressure, diabetes and cholesterol meds only)? Patient does not have SCP

## 2024-02-05 NOTE — TELEPHONE ENCOUNTER
Received request via: Pharmacy    Was the patient seen in the last year in this department? Yes    Does the patient have an active prescription (recently filled or refills available) for medication(s) requested? No    [facility]    Does the patient have correction Plus and need 100 day supply (blood pressure, diabetes and cholesterol meds only)? Patient does not have SCP

## 2024-02-06 RX ORDER — AMLODIPINE BESYLATE 5 MG/1
5 TABLET ORAL DAILY
Qty: 90 TABLET | Refills: 0 | Status: SHIPPED | OUTPATIENT
Start: 2024-02-06

## 2024-02-06 RX ORDER — AMLODIPINE BESYLATE 5 MG/1
5 TABLET ORAL DAILY
Qty: 90 TABLET | Refills: 3 | Status: SHIPPED | OUTPATIENT
Start: 2024-02-06

## 2024-02-06 RX ORDER — CLINDAMYCIN PHOSPHATE 10 MG/G
1 GEL TOPICAL DAILY
Qty: 30 G | Refills: 0 | Status: SHIPPED | OUTPATIENT
Start: 2024-02-06

## 2024-03-26 ENCOUNTER — PHARMACY VISIT (OUTPATIENT)
Dept: PHARMACY | Facility: MEDICAL CENTER | Age: 74
End: 2024-03-26
Payer: MEDICARE

## 2024-03-26 PROCEDURE — RXMED WILLOW AMBULATORY MEDICATION CHARGE: Performed by: INTERNAL MEDICINE

## 2024-03-26 RX ORDER — ZOSTER VACCINE RECOMBINANT, ADJUVANTED 50 MCG/0.5
0.5 KIT INTRAMUSCULAR
Qty: 0.5 ML | Refills: 0 | OUTPATIENT
Start: 2024-03-26

## 2024-04-13 ENCOUNTER — PATIENT MESSAGE (OUTPATIENT)
Dept: SLEEP MEDICINE | Facility: MEDICAL CENTER | Age: 74
End: 2024-04-13
Payer: MEDICARE

## 2024-04-13 DIAGNOSIS — G47.31 COMPLEX SLEEP APNEA SYNDROME: ICD-10-CM

## 2024-04-15 NOTE — PATIENT COMMUNICATION
Can you please put in a mask an supply order for this patient he was last seen 05/05/2023.  Please advise.

## 2024-04-26 DIAGNOSIS — E78.5 HYPERLIPIDEMIA, UNSPECIFIED HYPERLIPIDEMIA TYPE: ICD-10-CM

## 2024-04-26 DIAGNOSIS — I15.9 SECONDARY HYPERTENSION: ICD-10-CM

## 2024-04-26 DIAGNOSIS — G40.909 SEIZURE DISORDER (HCC): ICD-10-CM

## 2024-04-29 RX ORDER — LEVETIRACETAM 500 MG/1
500 TABLET ORAL 2 TIMES DAILY
Qty: 180 TABLET | Refills: 0 | Status: SHIPPED | OUTPATIENT
Start: 2024-04-29 | End: 2025-04-29

## 2024-04-29 NOTE — TELEPHONE ENCOUNTER
Received request via: Pharmacy    Was the patient seen in the last year in this department? Yes    Does the patient have an active prescription (recently filled or refills available) for medication(s) requested? No    [facility]    Does the patient have MCFP Plus and need 100 day supply (blood pressure, diabetes and cholesterol meds only)? Patient does not have SCP

## 2024-04-29 NOTE — TELEPHONE ENCOUNTER
Received request via: Pharmacy    Medication Name/Dosage Keppra 500 Mg    When was medication last prescribed 8.29.2023    How many refills were previously provided 3    How many Refills does he patient have left from last prescription 0    Was the patient seen in the last year in this department? Yes   Date of last office visit 8.7.2023     Per last Neurology Office Visit, when was the date of next follow up visit set for?           9 months                 Date of office visit follow up request 5.7.2024     Does the patient have an upcoming appointment? Yes   If yes, when 5.7.2024             If no, schedule appointment scheduled    Does the patient have intermediate Plus and need 100 day supply (blood pressure, diabetes and cholesterol meds only)? Patient does not have SCP

## 2024-04-29 NOTE — TELEPHONE ENCOUNTER
I am covering Dr. Delgado's inbox in his absence. The patient requested a refill of Keppra. I reviewed recent pertinent documentation to determine safety of refilling the medication and verified dosing. Prescription sent to patient's preferred pharmacy.

## 2024-04-30 ENCOUNTER — TELEPHONE (OUTPATIENT)
Dept: PHARMACY | Facility: MEDICAL CENTER | Age: 74
End: 2024-04-30
Payer: MEDICARE

## 2024-04-30 RX ORDER — ATORVASTATIN CALCIUM 40 MG/1
40 TABLET, FILM COATED ORAL
Qty: 90 TABLET | Refills: 1 | Status: SHIPPED | OUTPATIENT
Start: 2024-04-30

## 2024-04-30 RX ORDER — AMLODIPINE BESYLATE 5 MG/1
5 TABLET ORAL DAILY
Qty: 90 TABLET | Refills: 0 | Status: SHIPPED | OUTPATIENT
Start: 2024-04-30

## 2024-04-30 RX ORDER — LISINOPRIL 40 MG/1
40 TABLET ORAL DAILY
Qty: 90 TABLET | Refills: 1 | Status: SHIPPED | OUTPATIENT
Start: 2024-04-30

## 2024-04-30 NOTE — TELEPHONE ENCOUNTER
levETIRAcetam (KEPPRA) 500 MG Tab     Received Renewal PA request via [facility]  for Keppra. (Quantity:180, Day Supply:90)     Insurance: Medicare  Member ID:  [id number]  BIN: [id number]  PCN: [id number]  Group: [id number]     Ran Test claim via [place] & medication  $N/A  Patient record is being used elsewhere by user Kiara Leahy Since 4/30/2024 8:33Am PDT on workstation [employer].

## 2024-05-07 ENCOUNTER — TELEPHONE (OUTPATIENT)
Dept: PHARMACY | Facility: MEDICAL CENTER | Age: 74
End: 2024-05-07

## 2024-05-07 ENCOUNTER — OFFICE VISIT (OUTPATIENT)
Dept: NEUROLOGY | Facility: MEDICAL CENTER | Age: 74
End: 2024-05-07
Attending: STUDENT IN AN ORGANIZED HEALTH CARE EDUCATION/TRAINING PROGRAM
Payer: MEDICARE

## 2024-05-07 VITALS
SYSTOLIC BLOOD PRESSURE: 106 MMHG | HEIGHT: 67 IN | WEIGHT: 209.66 LBS | TEMPERATURE: 97.3 F | BODY MASS INDEX: 32.91 KG/M2 | HEART RATE: 59 BPM | DIASTOLIC BLOOD PRESSURE: 62 MMHG | OXYGEN SATURATION: 94 %

## 2024-05-07 DIAGNOSIS — G40.909 SEIZURE DISORDER (HCC): ICD-10-CM

## 2024-05-07 PROCEDURE — 3074F SYST BP LT 130 MM HG: CPT | Performed by: STUDENT IN AN ORGANIZED HEALTH CARE EDUCATION/TRAINING PROGRAM

## 2024-05-07 PROCEDURE — 99215 OFFICE O/P EST HI 40 MIN: CPT | Performed by: STUDENT IN AN ORGANIZED HEALTH CARE EDUCATION/TRAINING PROGRAM

## 2024-05-07 PROCEDURE — 3078F DIAST BP <80 MM HG: CPT | Performed by: STUDENT IN AN ORGANIZED HEALTH CARE EDUCATION/TRAINING PROGRAM

## 2024-05-07 RX ORDER — LEVETIRACETAM 500 MG/1
500 TABLET, FILM COATED, EXTENDED RELEASE ORAL DAILY
Qty: 90 TABLET | Refills: 0 | Status: SHIPPED | OUTPATIENT
Start: 2024-05-07 | End: 2024-08-05

## 2024-05-07 ASSESSMENT — FIBROSIS 4 INDEX: FIB4 SCORE: 1.35

## 2024-05-07 NOTE — TELEPHONE ENCOUNTER
levetiracetam (KEPPRA XR) 500 MG TABLET SR 24 HR     Received New Start PA request via [facility]  for levetiracetam. (Quantity:90, Day Supply:90)     Insurance: Medicare  Member ID:  [id number]  BIN: [id number]  PCN: [id number]  Group: [id number]     Ran Test claim via [place] & medication Pays for a $10.65 copay. Will outreach to patient to offer specialty pharmacy services and or release to preferred pharmacy

## 2024-05-07 NOTE — PATIENT INSTRUCTIONS
Please let our office know if you have any changes in your seizure frequency and/characteristics. Otherwise, please keep the diary of your events and bring it with you at the time of your next follow up visit with our office.     Please take vitamin D3 8115-3109 international units daily.     Please note that the following might precipitate seizures: missed doses of antiseizure medications, being sick with fever, stress, fatigue, sleep deprivation, not eating regularly, not drinking enough water, drinking too much alcohol, stopping alcohol suddenly if you are currently using it on a regular/daily basis, and/or using recreational drugs, among others.    Please note that the following might lead to an injury, potentially a life-threatening injury, in case you have a seizure and/or lose awareness while:   - being in a large body of water by yourself, such as bath, pool, lake, ocean, among others (risk of drowning)   - being on unprotected heights (risk of fall)   - being around and/or operating heavy machinery (risk of injury)   - being around open fire/hot surfaces (risk of burns)   - any other activities/circumstances, in which if you lose awareness, you might injure yourself and/or others.    Please call for help (crisis line and/or 911) in case you have thoughts of harming yourself and/or others.    ------------------------------------------------------------------------------------------  Instructions for your family/caregivers:  Please call 911 if the patient has a seizure longer than 2-3 minutes, if seizures are back to back without him recovering to his baseline, or he does not start recovering within 5-10 minutes after the seizure stops. During the seizure - please turn him on his side, please make sure his head is protected (for example, you should put a pillow under his head, if one is available), and please do not put anything in his mouth.    -------------------------------------------------------------------------------------------    It is important that your seizures are well controlled and you have none or have them rarely. In addition to avoiding injury related to breakthrough seizures, frequent seizures increase risk of SUDEP (sudden unexpected death in epilepsy), where a person goes into a seizure and then never wakes up - this is a rare complication of seizure disorder; one of the best available ways to prevent it is to control your seizures well.     Due to the high volume of patients we are trying to help, your physician will not be able to respond by phone or in [place] to your routine concerns between appointments.  This does not reflect a lack of interest or concern for you or your diagnosis.  Please bring these questions and concerns to your appointment where your physician can answer.  Please relay more pressing concerns to our office, either via [place], or by phone; if not able to reach us please visit nearby Urgent Care Center or Emergency Department.  If any emergent medical needs, please seek emergent medical help and/or call 911.    Please note that we are not able to fill out paperwork that might be related to your work, utility company, disability, and/or driving, among others, in between the visits.  Please schedule a dedicated appointment to address your paperwork, so we can do that in a timely manner.  This is not due to lack of concern or interest for your disease-related work/administrative problems, but to make sure that we provide the best possible care and to fill out your paperwork in a correct and timely manner.    Thank you for [facility]usting your neurological [facility] to [facility] and we look forward to continuing to serve you.

## 2024-07-24 DIAGNOSIS — N40.1 BPH ASSOCIATED WITH NOCTURIA: ICD-10-CM

## 2024-07-24 DIAGNOSIS — R35.1 BPH ASSOCIATED WITH NOCTURIA: ICD-10-CM

## 2024-07-24 RX ORDER — TAMSULOSIN HYDROCHLORIDE 0.4 MG/1
0.4 CAPSULE ORAL DAILY
Qty: 90 CAPSULE | Refills: 1 | Status: SHIPPED | OUTPATIENT
Start: 2024-07-24

## 2024-07-26 DIAGNOSIS — I15.9 SECONDARY HYPERTENSION: ICD-10-CM

## 2024-07-26 RX ORDER — AMLODIPINE BESYLATE 5 MG/1
5 TABLET ORAL DAILY
Qty: 90 TABLET | Refills: 0 | Status: SHIPPED | OUTPATIENT
Start: 2024-07-26

## 2024-08-02 ENCOUNTER — OFFICE VISIT (OUTPATIENT)
Dept: MEDICAL GROUP | Facility: MEDICAL CENTER | Age: 74
End: 2024-08-02
Payer: MEDICARE

## 2024-08-02 VITALS
BODY MASS INDEX: 33.12 KG/M2 | DIASTOLIC BLOOD PRESSURE: 70 MMHG | TEMPERATURE: 97.8 F | RESPIRATION RATE: 16 BRPM | WEIGHT: 211 LBS | OXYGEN SATURATION: 96 % | HEART RATE: 68 BPM | SYSTOLIC BLOOD PRESSURE: 118 MMHG | HEIGHT: 67 IN

## 2024-08-02 DIAGNOSIS — E78.5 HYPERLIPIDEMIA, UNSPECIFIED HYPERLIPIDEMIA TYPE: ICD-10-CM

## 2024-08-02 DIAGNOSIS — N40.1 BPH ASSOCIATED WITH NOCTURIA: ICD-10-CM

## 2024-08-02 DIAGNOSIS — R35.1 BPH ASSOCIATED WITH NOCTURIA: ICD-10-CM

## 2024-08-02 DIAGNOSIS — T14.8XXA BRUISING: ICD-10-CM

## 2024-08-02 DIAGNOSIS — G40.909 SEIZURE DISORDER (HCC): ICD-10-CM

## 2024-08-02 DIAGNOSIS — Z12.5 SCREENING FOR PROSTATE CANCER: ICD-10-CM

## 2024-08-02 DIAGNOSIS — I15.9 SECONDARY HYPERTENSION: ICD-10-CM

## 2024-08-02 DIAGNOSIS — Z12.11 SCREEN FOR COLON CANCER: ICD-10-CM

## 2024-08-02 PROCEDURE — 99214 OFFICE O/P EST MOD 30 MIN: CPT | Performed by: NURSE PRACTITIONER

## 2024-08-02 PROCEDURE — 3078F DIAST BP <80 MM HG: CPT | Performed by: NURSE PRACTITIONER

## 2024-08-02 PROCEDURE — 3074F SYST BP LT 130 MM HG: CPT | Performed by: NURSE PRACTITIONER

## 2024-08-02 ASSESSMENT — ENCOUNTER SYMPTOMS
ROS SKIN COMMENTS: BRUISING
FEVER: 0
CHILLS: 0
PALPITATIONS: 0

## 2024-08-02 ASSESSMENT — FIBROSIS 4 INDEX: FIB4 SCORE: 1.35

## 2024-08-02 NOTE — PROGRESS NOTES
Patient agreed to use of BONILLA: yes  Chief Complaint   Patient presents with    Lab Follow-up    Medication Follow-up       HISTORY OF PRESENT ILLNESS: Patient is a pleasant 74 y.o. male, established patient who presents today to discuss medical problems as listed below:    Assessment/Plan:    Ravindra was seen today for lab follow-up and medication follow-up.    Diagnoses and all orders for this visit:    Bruising    Seizure disorder (HCC)  Comments:  Stable on Keppra    BPH associated with nocturia    Secondary hypertension  -     CBC WITHOUT DIFFERENTIAL; Future  -     Comp Metabolic Panel; Future    Hyperlipidemia, unspecified hyperlipidemia type  -     Lipid Profile; Future    Screening for prostate cancer  -     PROSTATE SPECIFIC AG SCREENING; Future    Screen for colon cancer  -     Referral to GI for Colonoscopy              Assessment & Plan  1. Seizure disorder.  This is a chronic and stable condition, which is well-managed with Keppra. The patient is under the care of a neurologist.    2. BPH.  This condition is a chronic and stable issue and is well-managed with Flomax. The current regimen of Flomax will be maintained.    3. Hypertension.  This is a chronic and stable condition and is well-managed with the use of amlodipine and lisinopril. Continuation of amlodipine and lisinopril is advised.    4. Hyperlipidemia.  This condition is chronic and stable and is well-managed with atorvastatin. Atorvastatin will be continued.    5. Bruising.  This issue has been recently noted, with no bleeding reported. The patient's labs and is not currently on a blood thinner. The patient is advised to maintain hydration and monitor the condition. Return if no improvement or problem gets worse    History of Present Illness  The patient presents for evaluation of multiple medical concerns.    The patient reports intermittent bruising on his thumb, which he attributes to his anticoagulant medication. He denies any trauma to the  "affected area. His current medication regimen includes aspirin.    The patient has an upcoming appointment with his neurologist. His current medication regimen includes Keppra, which was recently transitioned to a 24-hour day regimen.    For his hypertension, the patient is prescribed amlodipine 5 mg and lisinopril 40 mg.    For management of his hyperlipidemia, the patient is prescribed atorvastatin.    For his benign prostatic hyperplasia, the patient is prescribed Flomax, which he reports as effective.    The patient utilizes a CPAP machine at night.    Supplemental Information  He takes vitamin D3, multivitamin, and vitamin C chewable.    Health Maintenance:  COMPLETED     Allergies, past medical history, past surgical history, family history, social history reviewed and updated.    Review of Systems   Constitutional:  Negative for chills, fever and malaise/fatigue.   Cardiovascular:  Negative for chest pain, palpitations and leg swelling.   Skin:         Bruising         Exam:    /70 (BP Location: Left arm, Patient Position: Sitting, BP Cuff Size: Adult)   Pulse 68   Temp 36.6 °C (97.8 °F) (Temporal)   Resp 16   Ht 1.702 m (5' 7\")   Wt 95.7 kg (211 lb)   SpO2 96%   BMI 33.05 kg/m²  Body mass index is 33.05 kg/m².    Physical Exam  Constitutional:       General: He is not in acute distress.     Appearance: He is not ill-appearing.   HENT:      Head: Normocephalic and atraumatic.      Nose: Nose normal.   Eyes:      General:         Right eye: No discharge.         Left eye: No discharge.   Cardiovascular:      Rate and Rhythm: Normal rate.      Pulses: Normal pulses.      Heart sounds: Normal heart sounds. No murmur heard.  Pulmonary:      Effort: Pulmonary effort is normal. No respiratory distress.      Breath sounds: Normal breath sounds. No stridor. No wheezing or rales.   Skin:     Findings: Bruising present. No rash.      Comments: A bruise noted in left arm   Neurological:      General: No " focal deficit present.      Mental Status: He is alert. Mental status is at baseline.   Psychiatric:         Mood and Affect: Mood normal.         Behavior: Behavior normal.          Results  Laboratory Studies  Platelet counts are normal. Liver function is normal.     Discussed with patient possible alternative diagnoses, patient is to take all medications as prescribed.      If symptoms persist FU w/PCP, if symptoms worsen go to emergency room.      If experiencing any side effects from prescribed medications report to the office immediately or go to emergency room.     Reviewed indication, dosage, usage and potential adverse effects of prescribed medications.      Reviewed risks and benefits of treatment plan. Patient verbalizes understanding of all instruction and verbally agrees to plan.     Discussed plan with the patient, and patient agrees to the above.      I personally reviewed prior external notes and test results pertinent to today's visit.      No follow-ups on file. 6 mos

## 2024-08-06 ENCOUNTER — OFFICE VISIT (OUTPATIENT)
Dept: NEUROLOGY | Facility: MEDICAL CENTER | Age: 74
End: 2024-08-06
Attending: STUDENT IN AN ORGANIZED HEALTH CARE EDUCATION/TRAINING PROGRAM
Payer: MEDICARE

## 2024-08-06 VITALS
WEIGHT: 213.19 LBS | OXYGEN SATURATION: 94 % | BODY MASS INDEX: 33.46 KG/M2 | DIASTOLIC BLOOD PRESSURE: 60 MMHG | HEART RATE: 54 BPM | SYSTOLIC BLOOD PRESSURE: 138 MMHG | HEIGHT: 67 IN | RESPIRATION RATE: 13 BRPM

## 2024-08-06 DIAGNOSIS — G40.909 SEIZURE DISORDER (HCC): ICD-10-CM

## 2024-08-06 PROCEDURE — 3078F DIAST BP <80 MM HG: CPT | Performed by: STUDENT IN AN ORGANIZED HEALTH CARE EDUCATION/TRAINING PROGRAM

## 2024-08-06 PROCEDURE — 99214 OFFICE O/P EST MOD 30 MIN: CPT | Performed by: STUDENT IN AN ORGANIZED HEALTH CARE EDUCATION/TRAINING PROGRAM

## 2024-08-06 PROCEDURE — 3075F SYST BP GE 130 - 139MM HG: CPT | Performed by: STUDENT IN AN ORGANIZED HEALTH CARE EDUCATION/TRAINING PROGRAM

## 2024-08-06 PROCEDURE — 99211 OFF/OP EST MAY X REQ PHY/QHP: CPT | Performed by: STUDENT IN AN ORGANIZED HEALTH CARE EDUCATION/TRAINING PROGRAM

## 2024-08-06 RX ORDER — LEVETIRACETAM 500 MG/1
500 TABLET, FILM COATED, EXTENDED RELEASE ORAL DAILY
COMMUNITY
End: 2024-08-06 | Stop reason: SDUPTHER

## 2024-08-06 RX ORDER — LEVETIRACETAM 500 MG/1
500 TABLET, FILM COATED, EXTENDED RELEASE ORAL DAILY
Qty: 90 TABLET | Refills: 1 | Status: SHIPPED | OUTPATIENT
Start: 2024-09-16 | End: 2025-03-15

## 2024-08-06 ASSESSMENT — FIBROSIS 4 INDEX: FIB4 SCORE: 1.35

## 2024-08-06 NOTE — PROGRESS NOTES
"[facility]  Follow up visit    Patient name: Ravindra Raines  YOB: 1950  MRN: [id number]  Date of visit: 8/6/2024      CC: follow up for seizure disorder      HPI:    Ravindra Raines is a 74 y.o. right-handed man with a history of two lifetime seizures in October 2022 and February 2023 who is being seen in follow up.     He is accompanied to clinic by his wife who supplements the history.    The first event occurred in October 2022, occurred out of sleep. His wife noted that he let out a \"horrible yell\" like he was having a nightmare, then was unresponsive to tactile stimulation, fists were clenched and his whole body was stiff with generalized shaking. He was confused afterwards, started to return to baseline after he was taken to the hospital. He initially did not know who his wife was. He was given nighttime oxygen at home. No tongue biting or urinary incontinence.     The second event occurred in February 2023. Also around 3-4 AM. He had a shorter postictal period with that event. He had tongue biting with that event, believes it was on the right. Semiology similar, whole body low amplitude shaking with stiffening.     Onset: 2022  Semiology: GTC out of sleep  Frequency: 2 total  Duration of spells: a few minutes with postictal period  Triggers: none identified, possibly poor sleep/untreated sleep apnea  Previous treatments: none  Status Epilepticus: no    He has been using CPAP since experiencing the two seizures. He started using CPAP after the seizure in February 2023.       Risk factors for epileptic seizures:  Normal birth history, no complications, born at term.   No history of significant head trauma.   No history of stroke or meningitis.  No family history of epilepsy.   No febrile seizures.    Does not smoke, former smoker quit in 1979. Occasional ETOH use in moderation. No recreational drug use. He is retired.       Interval history:  Patient is unaccompanied to " the visit and provides the history independently.     He is doing well. No breakthrough seizures.     He had been taking Keppra 500 mg nightly after self decreasing 500 mg BID dose. At last visit we switched this to 500 mg XR nightly for better coverage against seizures.     Last seizure: 02/20/2023     Mood: no issues currently, no history of suicidal ideation    Side effects: none noted    Barriers to taking medication appropriately: no missed doses, good compliance    Driving: yes    Vitamin D: taking      Past Medical History:   Past Medical History:   Diagnosis Date    Arrhythmia 09/02/2021    Apnea, sleep     BPH associated with nocturia     Chickenpox     Frequent urination     [place] measles     Snoring     Wears glasses        Current Medications:   Current Outpatient Medications:     [START ON 9/16/2024] levetiracetam (KEPPRA) 500 MG TABLET SR 24 HR, Take 1 Tablet by mouth every day for 180 days. Indications: Seizure, Disp: 90 Tablet, Rfl: 1    Cholecalciferol (VITAMIN D-3 PO), Take  by mouth., Disp: , Rfl:     amLODIPine (NORVASC) 5 MG Tab, Take 1 Tablet by mouth every day., Disp: 90 Tablet, Rfl: 0    tamsulosin (FLOMAX) 0.4 MG capsule, TAKE 1 CAPSULE BY MOUTH EVERY DAY, Disp: 90 Capsule, Rfl: 1    lisinopril (PRINIVIL) 40 MG tablet, Take 1 Tablet by mouth every day., Disp: 90 Tablet, Rfl: 1    atorvastatin (LIPITOR) 40 MG Tab, Take 1 Tablet by mouth at bedtime., Disp: 90 Tablet, Rfl: 1    Allergies: No Known Allergies      Physical Exam:   Ambulatory Vitals  Vitals:    08/06/24 0946   BP: 138/60   Pulse: (!) 54   Resp: 13   SpO2: 94%       Constitutional: Well-developed, well-nourished, good hygiene. Appears stated age.  Respiratory: normal respiratory effort  Skin: Warm, dry, intact. No rashes observed.  Neurologic:   Mental Status: Awake, alert, oriented x 4.   Speech: Fluent with normal prosody.   Memory: Able to recall recent and remote events accurately.    Concentration: Attentive. Able to focus on  history.   Fund of Knowledge: Appropriate.   Cranial Nerves:    CN II: PERRL    CN III, IV, VI: EOMI without nystagmus    CN VII: No facial asymmetry    CN VIII: Hearing intact to voice   Motor: moving all extremities fully and equally    Gait: ambulates steadily without assistive device   Movements: No resting tremors or abnormal movements observed.       Studies:      Labs reviewed: none recent      Imaging:     MRI Brain wwo contrast 10/21/2022  IMPRESSION:     1.  No acute abnormality.  2.  Mild chronic microvascular ischemic disease.  3.  Mild cerebral volume loss.    EEG Results:     Routine EEG 10/21/2022  INTERPRETATION:     This is a normal video EEG recording in the awake, drowsy and sleep states.  Note: A normal EEG does not rule out epilepsy.  If the clinical suspicion remains high for seizures, a prolonged recording to capture clinical or subclinical events may be helpful.      Assessment/Plan:   Ravindra Raines is a 74 y.o. man with a history of two lifetime seizures out of sleep with generalized tonic clonic semiology, possibly focal to bilateral tonic clonic. MRI of the brain was nonlesional, showed mild chronic microvascular disease and mild cerebral volume loss. Routine EEG was normal.     The patient had decreased dose of Keppra from 500 mg BID to 500 mg nightly. At last visit I changed this to Keppra  mg nightly for better coverage against seizures for the 24 hour period. I had ordered an ambulatory 24 hour EEG at the last visit to characterize interictal burden, patient would like to hold off on this given his overall stability. I think that at this time it is reasonable to do so given his clinical stability.     Discussed long-term treatment of Keppra. Since his previous MRI and EEG were normal, we could discuss a trial of medication wean at 2 years seizure-free. I would recommend an EEG that captures sleep prior to a wean.     Patient is able to drive, aware that any breakthrough  seizure must be reported to the DMV based on [place] state driving laws.     Counseled patient to continue vitamin D supplementation.       Follow up in approximately 6  months.    Kirti Horan M.D.   Diplomate, Neurology with Special Qualification in Epilepsy, American Board of Psychiatry and Neurology   of Clinical Neurology, [facility]  Level III Epilepsy Center, Department of Neurology at [facility]         During today's encounter we discussed available treatment options and their individual side effect profiles. Total encounter time caring for patient today 30 minutes.

## 2024-08-12 ENCOUNTER — PATIENT MESSAGE (OUTPATIENT)
Dept: MEDICAL GROUP | Facility: MEDICAL CENTER | Age: 74
End: 2024-08-12
Payer: MEDICARE

## 2024-09-04 ENCOUNTER — OFFICE VISIT (OUTPATIENT)
Dept: MEDICAL GROUP | Facility: MEDICAL CENTER | Age: 74
End: 2024-09-04
Payer: MEDICARE

## 2024-09-04 VITALS
RESPIRATION RATE: 16 BRPM | WEIGHT: 213 LBS | SYSTOLIC BLOOD PRESSURE: 112 MMHG | DIASTOLIC BLOOD PRESSURE: 58 MMHG | TEMPERATURE: 99.3 F | HEART RATE: 79 BPM | OXYGEN SATURATION: 96 % | HEIGHT: 67 IN | BODY MASS INDEX: 33.43 KG/M2

## 2024-09-04 DIAGNOSIS — M25.561 CHRONIC PAIN OF RIGHT KNEE: ICD-10-CM

## 2024-09-04 DIAGNOSIS — I15.9 SECONDARY HYPERTENSION: ICD-10-CM

## 2024-09-04 DIAGNOSIS — M67.90 TENDINOPATHY OF LOWER EXTREMITY: ICD-10-CM

## 2024-09-04 DIAGNOSIS — G89.29 CHRONIC PAIN OF RIGHT KNEE: ICD-10-CM

## 2024-09-04 PROBLEM — M67.969 TENDINOPATHY OF LOWER EXTREMITY: Status: ACTIVE | Noted: 2024-09-04

## 2024-09-04 PROCEDURE — 3074F SYST BP LT 130 MM HG: CPT | Performed by: NURSE PRACTITIONER

## 2024-09-04 PROCEDURE — 99214 OFFICE O/P EST MOD 30 MIN: CPT | Performed by: NURSE PRACTITIONER

## 2024-09-04 PROCEDURE — 3078F DIAST BP <80 MM HG: CPT | Performed by: NURSE PRACTITIONER

## 2024-09-04 ASSESSMENT — ENCOUNTER SYMPTOMS
FEVER: 0
PALPITATIONS: 0
CHILLS: 0

## 2024-09-04 ASSESSMENT — FIBROSIS 4 INDEX: FIB4 SCORE: 1.35

## 2024-09-04 NOTE — PROGRESS NOTES
Patient agreed to use of BONILLA: yes  Chief Complaint   Patient presents with    Leg Pain     Back of right leg, pulled muscle x 1.5 weeks       HISTORY OF PRESENT ILLNESS: Patient is a pleasant 74 y.o. male, established patient who presents today to discuss medical problems as listed below:    Assessment/Plan:    Ravindra was seen today for leg pain.    Diagnoses and all orders for this visit:    Tendinopathy of lower extremity  -     Referral to Physical Therapy  -     DX-KNEE 3 VIEWS Atraumatic Pain/Swelling/Deformity; Future  -     Referral to Orthopedics    Chronic pain of right knee  -     Referral to Physical Therapy  -     DX-KNEE 3 VIEWS Atraumatic Pain/Swelling/Deformity; Future  -     Referral to Orthopedics    Secondary hypertension              Assessment & Plan  1. Tendinopathy of lower extremity posterior hamstrings of the right leg.  Pain has been present for about a week and a half, improving with movement and worsening with rest and stretching. Physical therapy is recommended as the initial treatment. If there is no improvement with physical therapy in 4 to 6 weeks, a follow-up with PT will be scheduled, and a referral to orthopedics will be considered. Avoiding compression on the affected area is advised, and using a cushiony pillow while driving is recommended to alleviate discomfort.    2. Right knee pain.  The patient has been experiencing mild pain in the right knee for about a week and a half. An x-ray will be obtained to further investigate the cause of the pain. If there is no improvement after 4 to 6 weeks, a follow-up with PT will be scheduled, and a referral to orthopedics will be considered.    3. Hypertension.  This is a chronic and stable condition that is currently well managed with amlodipine and lisinopril. The current medication regimen of amlodipine and lisinopril will be continued.      History of Present Illness  The patient presents for evaluation of multiple medical  "concerns.    She is experiencing pain in her right inner thigh and hamstring, which intensifies when she sits or exits her car. The pain also disrupts her sleep and is most severe when she lies flat. Despite this, she manages to perform her morning exercises, although with some discomfort. She finds relief in stretching and has been applying Bengay to the affected area. The onset of this pain was approximately 1.5 weeks ago, leading her to suspect a pulled muscle.    Additionally, she reports mild pain in the lateral part of her right knee, which hinders her ability to bend it. She has been driving frequently, including a recent trip to [place], and plans to drive to [place] on Friday. She is seeking a resolution to her pain before her upcoming trips to [place] and [place] from 09/22/2024 to 10/05/2024. She has not engaged in any recent golfing activities.    Her blood pressure readings have been within the normal range.    Health Maintenance:  COMPLETED     Allergies, past medical history, past surgical history, family history, social history reviewed and updated.    Review of Systems   Constitutional:  Negative for chills, fever and malaise/fatigue.   Cardiovascular:  Negative for chest pain, palpitations and leg swelling.   Musculoskeletal:  Positive for joint pain.        Right knee pain on lateral side, intermittent and hamstring pain right leg        Exam:    /58 (BP Location: Left arm, Patient Position: Sitting)   Pulse 79   Temp 37.4 °C (99.3 °F) (Temporal)   Resp 16   Ht 1.702 m (5' 7\")   Wt 96.6 kg (213 lb)   SpO2 96%   BMI 33.36 kg/m²  Body mass index is 33.36 kg/m².    Physical Exam  Constitutional:       General: He is not in acute distress.     Appearance: He is not ill-appearing.   HENT:      Head: Normocephalic and atraumatic.      Nose: Nose normal.   Eyes:      General:         Right eye: No discharge.         Left eye: No discharge.   Cardiovascular:      Rate and Rhythm: " Normal rate.      Pulses: Normal pulses.      Heart sounds: Normal heart sounds. No murmur heard.  Pulmonary:      Effort: Pulmonary effort is normal. No respiratory distress.      Breath sounds: Normal breath sounds. No stridor. No wheezing or rales.   Skin:     Findings: No rash.   Neurological:      General: No focal deficit present.      Mental Status: He is alert. Mental status is at baseline.   Psychiatric:         Mood and Affect: Mood normal.         Behavior: Behavior normal.          Results       Discussed with patient possible alternative diagnoses, patient is to take all medications as prescribed.      If symptoms persist FU w/PCP, if symptoms worsen go to emergency room.      If experiencing any side effects from prescribed medications report to the office immediately or go to emergency room.     Reviewed indication, dosage, usage and potential adverse effects of prescribed medications.      Reviewed risks and benefits of treatment plan. Patient verbalizes understanding of all instruction and verbally agrees to plan.     Discussed plan with the patient, and patient agrees to the above.      I personally reviewed prior external notes and test results pertinent to today's visit.      No follow-ups on file.

## 2024-09-10 ENCOUNTER — APPOINTMENT (OUTPATIENT)
Dept: RADIOLOGY | Facility: MEDICAL CENTER | Age: 74
End: 2024-09-10
Attending: NURSE PRACTITIONER
Payer: MEDICARE

## 2024-09-10 DIAGNOSIS — M25.561 CHRONIC PAIN OF RIGHT KNEE: ICD-10-CM

## 2024-09-10 DIAGNOSIS — M67.90 TENDINOPATHY OF LOWER EXTREMITY: ICD-10-CM

## 2024-09-10 DIAGNOSIS — G89.29 CHRONIC PAIN OF RIGHT KNEE: ICD-10-CM

## 2024-09-10 PROCEDURE — 73562 X-RAY EXAM OF KNEE 3: CPT | Mod: RT

## 2024-09-13 PROBLEM — M17.9 OSTEOARTHRITIS, KNEE: Status: ACTIVE | Noted: 2024-09-13

## 2024-09-13 ASSESSMENT — FIBROSIS 4 INDEX: FIB4 SCORE: 1.35

## 2024-10-24 DIAGNOSIS — I15.9 SECONDARY HYPERTENSION: ICD-10-CM

## 2024-10-24 PROBLEM — M17.11 PRIMARY OSTEOARTHRITIS OF RIGHT KNEE: Status: ACTIVE | Noted: 2024-09-13

## 2024-10-29 RX ORDER — AMLODIPINE BESYLATE 5 MG/1
5 TABLET ORAL DAILY
Qty: 90 TABLET | Refills: 0 | Status: SHIPPED | OUTPATIENT
Start: 2024-10-29

## 2024-11-22 ENCOUNTER — HOSPITAL ENCOUNTER (OUTPATIENT)
Dept: RADIOLOGY | Facility: MEDICAL CENTER | Age: 74
End: 2024-11-22
Attending: STUDENT IN AN ORGANIZED HEALTH CARE EDUCATION/TRAINING PROGRAM
Payer: MEDICARE

## 2024-11-22 DIAGNOSIS — M25.561 CHRONIC PAIN OF RIGHT KNEE: ICD-10-CM

## 2024-11-22 DIAGNOSIS — G89.29 CHRONIC PAIN OF RIGHT KNEE: ICD-10-CM

## 2024-11-22 DIAGNOSIS — M17.11 PRIMARY OSTEOARTHRITIS OF RIGHT KNEE: ICD-10-CM

## 2024-11-22 PROCEDURE — 73700 CT LOWER EXTREMITY W/O DYE: CPT | Mod: RT

## 2024-12-10 ENCOUNTER — APPOINTMENT (OUTPATIENT)
Dept: ADMISSIONS | Facility: MEDICAL CENTER | Age: 74
End: 2024-12-10
Attending: ORTHOPAEDIC SURGERY
Payer: MEDICARE

## 2024-12-11 ENCOUNTER — PRE-ADMISSION TESTING (OUTPATIENT)
Dept: ADMISSIONS | Facility: MEDICAL CENTER | Age: 74
End: 2024-12-11
Attending: ORTHOPAEDIC SURGERY
Payer: MEDICARE

## 2024-12-11 VITALS — WEIGHT: 212.52 LBS | BODY MASS INDEX: 33.36 KG/M2 | HEIGHT: 67 IN

## 2024-12-11 DIAGNOSIS — Z01.812 PRE-OPERATIVE LABORATORY EXAMINATION: ICD-10-CM

## 2024-12-11 LAB
ANION GAP SERPL CALC-SCNC: 8 MMOL/L (ref 7–16)
BUN SERPL-MCNC: 18 MG/DL (ref 8–22)
CALCIUM SERPL-MCNC: 8.8 MG/DL (ref 8.4–10.2)
CHLORIDE SERPL-SCNC: 104 MMOL/L (ref 96–112)
CO2 SERPL-SCNC: 24 MMOL/L (ref 20–33)
CREAT SERPL-MCNC: 0.89 MG/DL (ref 0.5–1.4)
ERYTHROCYTE [DISTWIDTH] IN BLOOD BY AUTOMATED COUNT: 44 FL (ref 35.9–50)
GFR SERPLBLD CREATININE-BSD FMLA CKD-EPI: 90 ML/MIN/1.73 M 2
GLUCOSE SERPL-MCNC: 123 MG/DL (ref 65–99)
HCT VFR BLD AUTO: 42.9 % (ref 42–52)
HGB BLD-MCNC: 14.6 G/DL (ref 14–18)
MCH RBC QN AUTO: 29.3 PG (ref 27–33)
MCHC RBC AUTO-ENTMCNC: 34 G/DL (ref 32.3–36.5)
MCV RBC AUTO: 86 FL (ref 81.4–97.8)
PLATELET # BLD AUTO: 238 K/UL (ref 164–446)
PMV BLD AUTO: 11.4 FL (ref 9–12.9)
POTASSIUM SERPL-SCNC: 3.5 MMOL/L (ref 3.6–5.5)
RBC # BLD AUTO: 4.99 M/UL (ref 4.7–6.1)
SCCMEC + MECA PNL NOSE NAA+PROBE: NEGATIVE
SCCMEC + MECA PNL NOSE NAA+PROBE: NEGATIVE
SODIUM SERPL-SCNC: 136 MMOL/L (ref 135–145)
WBC # BLD AUTO: 7.1 K/UL (ref 4.8–10.8)

## 2024-12-11 PROCEDURE — 87641 MR-STAPH DNA AMP PROBE: CPT

## 2024-12-11 PROCEDURE — 80048 BASIC METABOLIC PNL TOTAL CA: CPT

## 2024-12-11 PROCEDURE — 87640 STAPH A DNA AMP PROBE: CPT | Mod: XU

## 2024-12-11 PROCEDURE — 36415 COLL VENOUS BLD VENIPUNCTURE: CPT

## 2024-12-11 PROCEDURE — 85027 COMPLETE CBC AUTOMATED: CPT

## 2024-12-11 RX ORDER — MUPIROCIN 20 MG/G
1 OINTMENT TOPICAL 2 TIMES DAILY
COMMUNITY
End: 2025-01-06

## 2024-12-11 ASSESSMENT — FIBROSIS 4 INDEX: FIB4 SCORE: 1.35

## 2024-12-11 NOTE — DISCHARGE PLANNING
DISCHARGE PLANNING NOTE - TOTAL JOINT    Procedure: Procedure(s):  ROBOTIC RIGHT TOTAL KNEE ARTHROPLASTY  Procedure Date: 1/7/2025  Insurance: Payor: MEDICARE / Plan: MEDICARE PART A & B / Product Type: *No Product type* /    Equipment currently available at home?  cane, front-wheel walker, raised toilet seat, and Ice Machine, Hand held shower head  Steps into the home? 2  Steps within the home? 0  Toilet height? ADA  Type of shower? walk-in shower  Home Oxygen? No  Portable tank?    Oxygen Provider:  Planning same day discharge: Yes    Is Outpatient Physical Therapy set up after surgery? Yes  Did you take the Total Joint Class and where? Yes, received [facility]ON book.  Who will be your transportation home on day of discharge? Spouse- Wife    Have you made arrangements to have someone stay with you at home for the first 3 days following discharge, and if so, whom? Spouse- Wife    Have you notified your surgeon that you do not have transportation or someone to help you after discharge? N/A    Are you planning on going to a transitional care facility, for example a skilled nursing facility, post operatively for rehab, and if so, have you contacted your insurance plan to see if they cover this? No    Met with the pt. Pt given a copy of Home Safety Checklist, Equipment Resource Guide, CHG scrub kit and instructions. Expected process in Recovery Room and dc criteria discussed with pt. All questions answered and verbalizes understanding of all instructions. No dc needs identified at this time. Anticipate dc to home without barriers.

## 2024-12-11 NOTE — PREADMIT AVS NOTE
Current Medications   Medication Instructions    amLODIPine (NORVASC) 5 MG Tab Continue taking medication prior to surgery    levetiracetam (KEPPRA) 500 MG TABLET SR 24 HR Continue taking medication prior to surgery    Cholecalciferol (VITAMIN D-3 PO) Stop 7 days before surgery    tamsulosin (FLOMAX) 0.4 MG capsule Continue taking medication prior to surgery    lisinopril (PRINIVIL) 40 MG tablet Stop 24 hours before surgery    atorvastatin (LIPITOR) 40 MG Tab Continue taking medication prior to surgery

## 2024-12-12 ENCOUNTER — HOSPITAL ENCOUNTER (OUTPATIENT)
Dept: LAB | Facility: MEDICAL CENTER | Age: 74
End: 2024-12-12
Attending: NURSE PRACTITIONER
Payer: MEDICARE

## 2024-12-12 DIAGNOSIS — R97.20 ELEVATED PSA: ICD-10-CM

## 2024-12-12 DIAGNOSIS — I15.9 SECONDARY HYPERTENSION: ICD-10-CM

## 2024-12-12 DIAGNOSIS — E78.5 HYPERLIPIDEMIA, UNSPECIFIED HYPERLIPIDEMIA TYPE: ICD-10-CM

## 2024-12-12 DIAGNOSIS — Z12.5 SCREENING FOR PROSTATE CANCER: ICD-10-CM

## 2024-12-12 LAB
ALBUMIN SERPL BCP-MCNC: 3.7 G/DL (ref 3.2–4.9)
ALBUMIN/GLOB SERPL: 1.3 G/DL
ALP SERPL-CCNC: 127 U/L (ref 30–99)
ALT SERPL-CCNC: 31 U/L (ref 2–50)
ANION GAP SERPL CALC-SCNC: 9 MMOL/L (ref 7–16)
AST SERPL-CCNC: 21 U/L (ref 12–45)
BILIRUB SERPL-MCNC: 0.4 MG/DL (ref 0.1–1.5)
BUN SERPL-MCNC: 20 MG/DL (ref 8–22)
CALCIUM ALBUM COR SERPL-MCNC: 9.2 MG/DL (ref 8.5–10.5)
CALCIUM SERPL-MCNC: 9 MG/DL (ref 8.4–10.2)
CHLORIDE SERPL-SCNC: 106 MMOL/L (ref 96–112)
CHOLEST SERPL-MCNC: 112 MG/DL (ref 100–199)
CO2 SERPL-SCNC: 24 MMOL/L (ref 20–33)
CREAT SERPL-MCNC: 0.93 MG/DL (ref 0.5–1.4)
FASTING STATUS PATIENT QL REPORTED: NORMAL
GFR SERPLBLD CREATININE-BSD FMLA CKD-EPI: 86 ML/MIN/1.73 M 2
GLOBULIN SER CALC-MCNC: 2.8 G/DL (ref 1.9–3.5)
GLUCOSE SERPL-MCNC: 107 MG/DL (ref 65–99)
HDLC SERPL-MCNC: 36 MG/DL
LDLC SERPL CALC-MCNC: 58 MG/DL
POTASSIUM SERPL-SCNC: 4 MMOL/L (ref 3.6–5.5)
PROT SERPL-MCNC: 6.5 G/DL (ref 6–8.2)
PSA SERPL-MCNC: 4.09 NG/ML (ref 0–4)
SODIUM SERPL-SCNC: 139 MMOL/L (ref 135–145)
TRIGL SERPL-MCNC: 89 MG/DL (ref 0–149)

## 2024-12-12 PROCEDURE — 36415 COLL VENOUS BLD VENIPUNCTURE: CPT | Mod: GA

## 2024-12-12 PROCEDURE — 84153 ASSAY OF PSA TOTAL: CPT | Mod: GA

## 2024-12-12 PROCEDURE — 80061 LIPID PANEL: CPT

## 2024-12-12 PROCEDURE — 80053 COMPREHEN METABOLIC PANEL: CPT

## 2025-01-06 ENCOUNTER — ANESTHESIA EVENT (OUTPATIENT)
Dept: SURGERY | Facility: MEDICAL CENTER | Age: 75
End: 2025-01-06
Payer: MEDICARE

## 2025-01-06 PROCEDURE — RXMED WILLOW AMBULATORY MEDICATION CHARGE: Performed by: STUDENT IN AN ORGANIZED HEALTH CARE EDUCATION/TRAINING PROGRAM

## 2025-01-06 RX ORDER — MELOXICAM 7.5 MG/1
7.5 TABLET ORAL DAILY
Qty: 30 TABLET | Refills: 0 | Status: SHIPPED | OUTPATIENT
Start: 2025-01-07 | End: 2025-02-06

## 2025-01-06 RX ORDER — OXYCODONE HYDROCHLORIDE 5 MG/1
5-7.5 TABLET ORAL EVERY 4 HOURS PRN
Qty: 63 TABLET | Refills: 0 | Status: SHIPPED | OUTPATIENT
Start: 2025-01-07 | End: 2025-01-14

## 2025-01-06 RX ORDER — ASPIRIN 81 MG/1
81 TABLET, CHEWABLE ORAL 2 TIMES DAILY
Qty: 56 TABLET | Refills: 0
Start: 2025-01-06 | End: 2025-02-03

## 2025-01-06 RX ORDER — DOCUSATE SODIUM 100 MG/1
100 CAPSULE, LIQUID FILLED ORAL 2 TIMES DAILY
Qty: 60 CAPSULE | Refills: 0 | Status: SHIPPED | OUTPATIENT
Start: 2025-01-07 | End: 2025-02-06

## 2025-01-06 RX ORDER — ACETAMINOPHEN 500 MG
1000 TABLET ORAL 3 TIMES DAILY PRN
Qty: 90 TABLET | Refills: 0
Start: 2025-01-06 | End: 2025-02-05

## 2025-01-06 NOTE — DISCHARGE INSTRUCTIONS
HOME CARE INSTRUCTIONS    ACTIVITY: Rest and take it easy for the first 24 hours.  A responsible adult is recommended to remain with you during that time.  It is normal to feel sleepy.  We encourage you to not do anything that requires balance, judgment or coordination.    FOR 24 HOURS DO NOT:  Drive, operate machinery or run household appliances.  Drink beer or alcoholic beverages.  Make important decisions or sign legal documents.    SPECIAL INSTRUCTIONS:   Patient will be discharged home and follow up with Dr. Stephens in 2 weeks, for which the patient already has scheduled.    You may call or text Dr. Stephens' medical assistant during business hours at [phone].  You may call the emergency [facility] line during nights/weekends/holidays if needed at [phone].    Instructions:  -Leave the bandage in place until your followup appointment in 2 weeks.  -May shower with bandage in place, if bandage becomes soaked underneath please remove and call the office immediately.  -No baths/tubs/pools/submersion of the wound for now.  -Call if there is significant drainage beneath the bandage    -Put as much weight as comfortable on the operative leg.  Use a walker to assist with balance to avoid any falls.  -It is important to start moving and stretching right away, otherwise the joint can stiffen.    -Take your blood clot prevention medication for 4 weeks after surgery (81 mg of Aspirin, twice daily).  -Use ice frequently to help with pain and swelling control  -Pain management:  Standard pain regimen should be:  Ice as much as possible.  Apply the ice anywhere you feel pain.  Meloxicam (anti-inflammatory)- 7.5mg one tablet every day.  Take this automatically on a scheduled basis.  Do not take any other NSAIDs while taking this.  Tylenol (acetaminophen) - 1000mg every 8 hours. Take this automatically on a scheduled basis.  Tramadol - 1-2 capsules every 6 hours. Take this automatically on a scheduled basis until no longer  needed for pain.  Oxycodone - 1-2 tablets every 6 hours only if needed.  You would take the oxycodone 3 hours after the Tramadol, such that you are taking one or the other every 3 hours        -Try to limit the amount of oxycodone since it tends to cause constipation, drowsiness, etc.  But if you need it, take it.        -100mg of Colace (docusate) will be prescribed. Take this twice a day while still taking Tramadol or Oxycodone. Can discontinue after opiates are no longer being taken.  If bowel movement has not occurred in 48 hours please add in a laxative called Senna (over the counter) twice daily in conjunction with the colace. If no bowel movement is produced 48 hours after adding in Senna please start Milk of Magnesia (over the counter).     AGGRESSIVE RANGE OF MOTION.  Do a quad set a million times per day.  Have a family member push down on the thigh and shin every 20 minutes with 2-3 pillows under the heel to help stretch the knee straight.  Flex the knee as far as possible every 20 minutes.    DIET: To avoid nausea, slowly advance diet as tolerated, avoiding spicy or greasy foods for the first day.  Add more substantial food to your diet according to your physician's instructions.  Babies can be fed formula or breast milk as soon as they are hungry.  INCREASE FLUIDS AND FIBER TO AVOID CONSTIPATION.    SURGICAL DRESSING/BATHING: See above    MEDICATIONS: Resume taking daily medication.  Take prescribed pain medication with food.  If no medication is prescribed, you may take non-aspirin pain medication if needed.  PAIN MEDICATION CAN BE VERY CONSTIPATING.  Take a stool softener or laxative such as senokot, pericolace, or milk of magnesia if needed.    Prescription given for ______.  Last pain medication given: You had Tylenol at 10:20am.  OK to take Tylenol again at 6:20pm. You had Toradol (an NSAID like Motrin) at 11am    A follow-up appointment should be arranged with your doctor; call to schedule.    You  should CALL YOUR PHYSICIAN if you develop:  Fever greater than 101 degrees F.  Pain not relieved by medication, or persistent nausea or vomiting.  Excessive bleeding (blood soaking through dressing) or unexpected drainage from the wound.  Extreme redness or swelling around the incision site, drainage of pus or foul smelling drainage.  Inability to urinate or empty your bladder within 8 hours.  Problems with breathing or chest pain.    You should call 911 if you develop problems with breathing or chest pain.  If you are unable to contact your doctor or surgical center, you should go to the nearest emergency room or urgent care center.  Physician's telephone #: Dr. Stephens [phone]    MILD FLU-LIKE SYMPTOMS ARE NORMAL.  YOU MAY EXPERIENCE GENERALIZED MUSCLE ACHES, THROAT IRRITATION, HEADACHE AND/OR SOME NAUSEA.    If any questions arise, call your doctor.  If your doctor is not available, please feel free to call the Surgical Center at [phone].   A registered nurse may call you a few days after your surgery to see how you are doing after your procedure.    You may also receive a survey in the mail within the next two weeks and we ask that you take a few moments to complete the survey and return it to us.  Our goal is to provide you with very good care and we value your comments.     Peripheral Nerve Block Discharge Instructions from Same Day Surgery and Inpatient :    What to Expect - Lower Extremity  The block may cause you to experience numbness and weakness in your thigh and knee or calf and foot on the same side as your surgery  Numbness, tingling and / or weakness are all normal. For some people, this may be an unpleasant sensation  These issues will be resolved when the local anesthetic wears off   You may experience numbness and tingling in your thigh on the same side as your surgery if the block medicine was injected at your groin area  Numbness will make it difficult to walk  You may have problems  "with balance and walking so be very careful   Follow your surgeon's direction regarding weight bearing on your surgical limb  Be very careful with your numb limb  Precautions  The numbness may affect your balance  Be careful when walking or moving around  Your leg may be weak: be very careful putting weight on it  If your surgeon did not specify a time, you should not bear weight for 24 hours  Be sure to ask for help when you need it  It is better to have help than to fall and hurt yourself  Prevent Injury  Protect the limb like a baby  Beware of exposing your limb to extreme heat or cold or trauma  The limb may be injured without you noticing because it is numb  Keep the limb elevated whenever possible  Do not sleep on the limb  Change the position of the limb regularly  Avoid putting pressure on your surgical limb  Pain Control  The initial block on the day of surgery will make your extremity feel \"numb\"  Any consecutive injection including prior to discharge from the hospital will make your extremity feel \"numb\"  You may feel an aching or burning when the local anesthesia starts to wear off  Take pain pills as prescribed by your surgeon  Call your surgeon or anesthesiologist if you do not have adequate pain control      Depression / Suicide Risk    As you are discharged from this [facility] facility, it is important to learn how to keep safe from harming yourself.    Recognize the warning signs:  Abrupt changes in personality, positive or negative- including increase in energy   Giving away possessions  Change in eating patterns- significant weight changes-  positive or negative  Change in sleeping patterns- unable to sleep or sleeping all the time   Unwillingness or inability to communicate  Depression  Unusual sadness, discouragement and loneliness  Talk of wanting to die  Neglect of personal appearance   Rebelliousness- reckless behavior  Withdrawal from people/activities they love  Confusion- inability to " concentrate     If you or a loved one observes any of these behaviors or has concerns about self-harm, here's what you can do:  Talk about it- your feelings and reasons for harming yourself  Remove any means that you might use to hurt yourself (examples: pills, rope, extension cords, firearm)  Get professional help from the community (Mental Health, Substance Abuse, psychological counseling)  Do not be alone:Call your Safe Contact- someone whom you trust who will be there for you.  Call your local CRISIS HOTLINE 402-9491 or 389-342-7930  Call your local Children's Mobile Crisis Response Team [place] (654) 190-0572 or www.Survmetrics  Call the toll free National Suicide Prevention Hotlines   National Suicide Prevention Lifeline 328-630-ITWV (0581)  National Hope Line Network 800-SUICIDE (685-9168)    I acknowledge receipt and understanding of these Home Care instructions.

## 2025-01-07 ENCOUNTER — ANESTHESIA (OUTPATIENT)
Dept: SURGERY | Facility: MEDICAL CENTER | Age: 75
End: 2025-01-07
Payer: MEDICARE

## 2025-01-07 ENCOUNTER — HOSPITAL ENCOUNTER (OUTPATIENT)
Facility: MEDICAL CENTER | Age: 75
End: 2025-01-07
Attending: ORTHOPAEDIC SURGERY | Admitting: ORTHOPAEDIC SURGERY
Payer: MEDICARE

## 2025-01-07 ENCOUNTER — PHARMACY VISIT (OUTPATIENT)
Dept: PHARMACY | Facility: MEDICAL CENTER | Age: 75
End: 2025-01-07
Payer: COMMERCIAL

## 2025-01-07 VITALS
WEIGHT: 210.43 LBS | RESPIRATION RATE: 18 BRPM | DIASTOLIC BLOOD PRESSURE: 65 MMHG | HEART RATE: 77 BPM | OXYGEN SATURATION: 91 % | TEMPERATURE: 97 F | SYSTOLIC BLOOD PRESSURE: 147 MMHG | BODY MASS INDEX: 33.03 KG/M2 | HEIGHT: 67 IN

## 2025-01-07 DIAGNOSIS — M17.11 PRIMARY OSTEOARTHRITIS OF RIGHT KNEE: ICD-10-CM

## 2025-01-07 DIAGNOSIS — G89.18 POST-OPERATIVE PAIN: ICD-10-CM

## 2025-01-07 PROCEDURE — 160036 HCHG PACU - EA ADDL 30 MINS PHASE I: Performed by: ORTHOPAEDIC SURGERY

## 2025-01-07 PROCEDURE — 160035 HCHG PACU - 1ST 60 MINS PHASE I: Performed by: ORTHOPAEDIC SURGERY

## 2025-01-07 PROCEDURE — 160002 HCHG RECOVERY MINUTES (STAT): Performed by: ORTHOPAEDIC SURGERY

## 2025-01-07 PROCEDURE — 160042 HCHG SURGERY MINUTES - EA ADDL 1 MIN LEVEL 5: Performed by: ORTHOPAEDIC SURGERY

## 2025-01-07 PROCEDURE — 700111 HCHG RX REV CODE 636 W/ 250 OVERRIDE (IP): Performed by: STUDENT IN AN ORGANIZED HEALTH CARE EDUCATION/TRAINING PROGRAM

## 2025-01-07 PROCEDURE — 700101 HCHG RX REV CODE 250: Performed by: ANESTHESIOLOGY

## 2025-01-07 PROCEDURE — 700111 HCHG RX REV CODE 636 W/ 250 OVERRIDE (IP): Performed by: ORTHOPAEDIC SURGERY

## 2025-01-07 PROCEDURE — 27447 TOTAL KNEE ARTHROPLASTY: CPT | Mod: RT | Performed by: ORTHOPAEDIC SURGERY

## 2025-01-07 PROCEDURE — RXMED WILLOW AMBULATORY MEDICATION CHARGE: Performed by: STUDENT IN AN ORGANIZED HEALTH CARE EDUCATION/TRAINING PROGRAM

## 2025-01-07 PROCEDURE — 700102 HCHG RX REV CODE 250 W/ 637 OVERRIDE(OP): Performed by: ANESTHESIOLOGY

## 2025-01-07 PROCEDURE — 700105 HCHG RX REV CODE 258: Performed by: ORTHOPAEDIC SURGERY

## 2025-01-07 PROCEDURE — 97110 THERAPEUTIC EXERCISES: CPT

## 2025-01-07 PROCEDURE — 160046 HCHG PACU - 1ST 60 MINS PHASE II: Performed by: ORTHOPAEDIC SURGERY

## 2025-01-07 PROCEDURE — 20985 CPTR-ASST DIR MS PX: CPT | Mod: ASROC | Performed by: STUDENT IN AN ORGANIZED HEALTH CARE EDUCATION/TRAINING PROGRAM

## 2025-01-07 PROCEDURE — 700101 HCHG RX REV CODE 250: Performed by: ORTHOPAEDIC SURGERY

## 2025-01-07 PROCEDURE — 64447 NJX AA&/STRD FEMORAL NRV IMG: CPT | Performed by: ORTHOPAEDIC SURGERY

## 2025-01-07 PROCEDURE — 97161 PT EVAL LOW COMPLEX 20 MIN: CPT

## 2025-01-07 PROCEDURE — 27447 TOTAL KNEE ARTHROPLASTY: CPT | Mod: ASROC,RT | Performed by: STUDENT IN AN ORGANIZED HEALTH CARE EDUCATION/TRAINING PROGRAM

## 2025-01-07 PROCEDURE — 700111 HCHG RX REV CODE 636 W/ 250 OVERRIDE (IP): Performed by: ANESTHESIOLOGY

## 2025-01-07 PROCEDURE — 160031 HCHG SURGERY MINUTES - 1ST 30 MINS LEVEL 5: Performed by: ORTHOPAEDIC SURGERY

## 2025-01-07 PROCEDURE — 502714 HCHG ROBOTIC SURGERY SERVICES: Performed by: ORTHOPAEDIC SURGERY

## 2025-01-07 PROCEDURE — C1776 JOINT DEVICE (IMPLANTABLE): HCPCS | Performed by: ORTHOPAEDIC SURGERY

## 2025-01-07 PROCEDURE — 160047 HCHG PACU  - EA ADDL 30 MINS PHASE II: Performed by: ORTHOPAEDIC SURGERY

## 2025-01-07 PROCEDURE — A9270 NON-COVERED ITEM OR SERVICE: HCPCS | Performed by: ANESTHESIOLOGY

## 2025-01-07 PROCEDURE — 160025 RECOVERY II MINUTES (STATS): Performed by: ORTHOPAEDIC SURGERY

## 2025-01-07 PROCEDURE — 160009 HCHG ANES TIME/MIN: Performed by: ORTHOPAEDIC SURGERY

## 2025-01-07 PROCEDURE — 20985 CPTR-ASST DIR MS PX: CPT | Performed by: ORTHOPAEDIC SURGERY

## 2025-01-07 PROCEDURE — C1713 ANCHOR/SCREW BN/BN,TIS/BN: HCPCS | Performed by: ORTHOPAEDIC SURGERY

## 2025-01-07 PROCEDURE — 160048 HCHG OR STATISTICAL LEVEL 1-5: Performed by: ORTHOPAEDIC SURGERY

## 2025-01-07 DEVICE — IMPLANTABLE DEVICE: Type: IMPLANTABLE DEVICE | Site: KNEE | Status: FUNCTIONAL

## 2025-01-07 DEVICE — COMPONENT FEMORAL TRIATHLON CRUCIATE RETAIN RIGHT SIZE 5 (1EA): Type: IMPLANTABLE DEVICE | Site: KNEE | Status: FUNCTIONAL

## 2025-01-07 DEVICE — BASEPLATE TIBIAL TRIATHLON TRITANIUM SIZE 6 (1EA): Type: IMPLANTABLE DEVICE | Site: KNEE | Status: FUNCTIONAL

## 2025-01-07 RX ORDER — OXYCODONE HCL 5 MG/5 ML
5 SOLUTION, ORAL ORAL
Status: DISCONTINUED | OUTPATIENT
Start: 2025-01-07 | End: 2025-01-07 | Stop reason: HOSPADM

## 2025-01-07 RX ORDER — TRAMADOL HYDROCHLORIDE 50 MG/1
50 TABLET ORAL EVERY 4 HOURS PRN
Status: CANCELLED | OUTPATIENT
Start: 2025-01-07

## 2025-01-07 RX ORDER — LABETALOL HYDROCHLORIDE 5 MG/ML
5 INJECTION, SOLUTION INTRAVENOUS
Status: DISCONTINUED | OUTPATIENT
Start: 2025-01-07 | End: 2025-01-07 | Stop reason: HOSPADM

## 2025-01-07 RX ORDER — EPHEDRINE SULFATE 50 MG/ML
5 INJECTION, SOLUTION INTRAVENOUS
Status: DISCONTINUED | OUTPATIENT
Start: 2025-01-07 | End: 2025-01-07 | Stop reason: HOSPADM

## 2025-01-07 RX ORDER — ACETAMINOPHEN 500 MG
1000 TABLET ORAL EVERY 6 HOURS PRN
Status: CANCELLED | OUTPATIENT
Start: 2025-01-12

## 2025-01-07 RX ORDER — SODIUM CHLORIDE, SODIUM LACTATE, POTASSIUM CHLORIDE, CALCIUM CHLORIDE 600; 310; 30; 20 MG/100ML; MG/100ML; MG/100ML; MG/100ML
INJECTION, SOLUTION INTRAVENOUS CONTINUOUS
Status: ACTIVE | OUTPATIENT
Start: 2025-01-07 | End: 2025-01-07

## 2025-01-07 RX ORDER — OXYCODONE HYDROCHLORIDE 10 MG/1
10 TABLET ORAL
Status: CANCELLED | OUTPATIENT
Start: 2025-01-07

## 2025-01-07 RX ORDER — OXYCODONE HCL 5 MG/5 ML
10 SOLUTION, ORAL ORAL
Status: DISCONTINUED | OUTPATIENT
Start: 2025-01-07 | End: 2025-01-07 | Stop reason: HOSPADM

## 2025-01-07 RX ORDER — HYDRALAZINE HYDROCHLORIDE 20 MG/ML
5 INJECTION INTRAMUSCULAR; INTRAVENOUS
Status: DISCONTINUED | OUTPATIENT
Start: 2025-01-07 | End: 2025-01-07 | Stop reason: HOSPADM

## 2025-01-07 RX ORDER — KETOROLAC TROMETHAMINE 30 MG/ML
INJECTION, SOLUTION INTRAMUSCULAR; INTRAVENOUS
Status: DISCONTINUED | OUTPATIENT
Start: 2025-01-07 | End: 2025-01-07 | Stop reason: HOSPADM

## 2025-01-07 RX ORDER — BISACODYL 10 MG
10 SUPPOSITORY, RECTAL RECTAL
Status: CANCELLED | OUTPATIENT
Start: 2025-01-07

## 2025-01-07 RX ORDER — MAGNESIUM SULFATE HEPTAHYDRATE 40 MG/ML
INJECTION, SOLUTION INTRAVENOUS PRN
Status: DISCONTINUED | OUTPATIENT
Start: 2025-01-07 | End: 2025-01-07 | Stop reason: SURG

## 2025-01-07 RX ORDER — HALOPERIDOL 5 MG/ML
1 INJECTION INTRAMUSCULAR
Status: DISCONTINUED | OUTPATIENT
Start: 2025-01-07 | End: 2025-01-07 | Stop reason: HOSPADM

## 2025-01-07 RX ORDER — DEXAMETHASONE SODIUM PHOSPHATE 4 MG/ML
4 INJECTION, SOLUTION INTRA-ARTICULAR; INTRALESIONAL; INTRAMUSCULAR; INTRAVENOUS; SOFT TISSUE
Status: CANCELLED | OUTPATIENT
Start: 2025-01-07

## 2025-01-07 RX ORDER — AMLODIPINE BESYLATE 5 MG/1
5 TABLET ORAL DAILY
Status: DISCONTINUED | OUTPATIENT
Start: 2025-01-07 | End: 2025-01-07 | Stop reason: HOSPADM

## 2025-01-07 RX ORDER — ROPIVACAINE HYDROCHLORIDE 5 MG/ML
INJECTION, SOLUTION EPIDURAL; INFILTRATION; PERINEURAL
Status: COMPLETED | OUTPATIENT
Start: 2025-01-07 | End: 2025-01-07

## 2025-01-07 RX ORDER — DIPHENHYDRAMINE HCL 25 MG
25 TABLET ORAL EVERY 6 HOURS PRN
Status: CANCELLED | OUTPATIENT
Start: 2025-01-07

## 2025-01-07 RX ORDER — HYDROMORPHONE HYDROCHLORIDE 1 MG/ML
0.1 INJECTION, SOLUTION INTRAMUSCULAR; INTRAVENOUS; SUBCUTANEOUS
Status: DISCONTINUED | OUTPATIENT
Start: 2025-01-07 | End: 2025-01-07 | Stop reason: HOSPADM

## 2025-01-07 RX ORDER — LISINOPRIL 40 MG/1
40 TABLET ORAL DAILY
Status: DISCONTINUED | OUTPATIENT
Start: 2025-01-07 | End: 2025-01-07 | Stop reason: HOSPADM

## 2025-01-07 RX ORDER — AMOXICILLIN 250 MG
1 CAPSULE ORAL
Status: CANCELLED | OUTPATIENT
Start: 2025-01-07

## 2025-01-07 RX ORDER — DOCUSATE SODIUM 100 MG/1
100 CAPSULE, LIQUID FILLED ORAL 2 TIMES DAILY
Status: CANCELLED | OUTPATIENT
Start: 2025-01-07

## 2025-01-07 RX ORDER — MIDAZOLAM HYDROCHLORIDE 1 MG/ML
INJECTION INTRAMUSCULAR; INTRAVENOUS PRN
Status: DISCONTINUED | OUTPATIENT
Start: 2025-01-07 | End: 2025-01-07 | Stop reason: SURG

## 2025-01-07 RX ORDER — DEXAMETHASONE SODIUM PHOSPHATE 4 MG/ML
INJECTION, SOLUTION INTRA-ARTICULAR; INTRALESIONAL; INTRAMUSCULAR; INTRAVENOUS; SOFT TISSUE PRN
Status: DISCONTINUED | OUTPATIENT
Start: 2025-01-07 | End: 2025-01-07 | Stop reason: SURG

## 2025-01-07 RX ORDER — LIDOCAINE HYDROCHLORIDE 20 MG/ML
INJECTION, SOLUTION EPIDURAL; INFILTRATION; INTRACAUDAL; PERINEURAL PRN
Status: DISCONTINUED | OUTPATIENT
Start: 2025-01-07 | End: 2025-01-07 | Stop reason: SURG

## 2025-01-07 RX ORDER — CEFAZOLIN SODIUM 1 G/3ML
2 INJECTION, POWDER, FOR SOLUTION INTRAMUSCULAR; INTRAVENOUS ONCE
Status: COMPLETED | OUTPATIENT
Start: 2025-01-07 | End: 2025-01-07

## 2025-01-07 RX ORDER — ONDANSETRON 2 MG/ML
4 INJECTION INTRAMUSCULAR; INTRAVENOUS
Status: DISCONTINUED | OUTPATIENT
Start: 2025-01-07 | End: 2025-01-07 | Stop reason: HOSPADM

## 2025-01-07 RX ORDER — SODIUM CHLORIDE, SODIUM LACTATE, POTASSIUM CHLORIDE, CALCIUM CHLORIDE 600; 310; 30; 20 MG/100ML; MG/100ML; MG/100ML; MG/100ML
INJECTION, SOLUTION INTRAVENOUS CONTINUOUS
Status: DISCONTINUED | OUTPATIENT
Start: 2025-01-07 | End: 2025-01-07 | Stop reason: HOSPADM

## 2025-01-07 RX ORDER — METOCLOPRAMIDE HYDROCHLORIDE 5 MG/ML
INJECTION INTRAMUSCULAR; INTRAVENOUS PRN
Status: DISCONTINUED | OUTPATIENT
Start: 2025-01-07 | End: 2025-01-07 | Stop reason: SURG

## 2025-01-07 RX ORDER — HALOPERIDOL 5 MG/ML
1 INJECTION INTRAMUSCULAR EVERY 6 HOURS PRN
Status: CANCELLED | OUTPATIENT
Start: 2025-01-07

## 2025-01-07 RX ORDER — SODIUM CHLORIDE 9 MG/ML
INJECTION, SOLUTION INTRAMUSCULAR; INTRAVENOUS; SUBCUTANEOUS
Status: DISCONTINUED | OUTPATIENT
Start: 2025-01-07 | End: 2025-01-07 | Stop reason: HOSPADM

## 2025-01-07 RX ORDER — HYDROMORPHONE HYDROCHLORIDE 1 MG/ML
0.2 INJECTION, SOLUTION INTRAMUSCULAR; INTRAVENOUS; SUBCUTANEOUS
Status: DISCONTINUED | OUTPATIENT
Start: 2025-01-07 | End: 2025-01-07 | Stop reason: HOSPADM

## 2025-01-07 RX ORDER — ROCURONIUM BROMIDE 10 MG/ML
INJECTION, SOLUTION INTRAVENOUS PRN
Status: DISCONTINUED | OUTPATIENT
Start: 2025-01-07 | End: 2025-01-07 | Stop reason: SURG

## 2025-01-07 RX ORDER — SUCCINYLCHOLINE CHLORIDE 20 MG/ML
INJECTION INTRAMUSCULAR; INTRAVENOUS PRN
Status: DISCONTINUED | OUTPATIENT
Start: 2025-01-07 | End: 2025-01-07 | Stop reason: SURG

## 2025-01-07 RX ORDER — KETOROLAC TROMETHAMINE 15 MG/ML
15 INJECTION, SOLUTION INTRAMUSCULAR; INTRAVENOUS EVERY 6 HOURS
Status: CANCELLED | OUTPATIENT
Start: 2025-01-07 | End: 2025-01-10

## 2025-01-07 RX ORDER — ROPIVACAINE HYDROCHLORIDE 5 MG/ML
INJECTION, SOLUTION EPIDURAL; INFILTRATION; PERINEURAL
Status: DISCONTINUED | OUTPATIENT
Start: 2025-01-07 | End: 2025-01-07 | Stop reason: HOSPADM

## 2025-01-07 RX ORDER — DIPHENHYDRAMINE HYDROCHLORIDE 50 MG/ML
25 INJECTION INTRAMUSCULAR; INTRAVENOUS EVERY 6 HOURS PRN
Status: CANCELLED | OUTPATIENT
Start: 2025-01-07

## 2025-01-07 RX ORDER — LEVETIRACETAM 500 MG/1
500 TABLET, FILM COATED, EXTENDED RELEASE ORAL DAILY
Status: DISCONTINUED | OUTPATIENT
Start: 2025-01-07 | End: 2025-01-07 | Stop reason: HOSPADM

## 2025-01-07 RX ORDER — DIPHENHYDRAMINE HCL 25 MG
25 TABLET ORAL NIGHTLY PRN
Status: CANCELLED | OUTPATIENT
Start: 2025-01-08

## 2025-01-07 RX ORDER — OXYCODONE HYDROCHLORIDE 5 MG/1
5 TABLET ORAL
Status: CANCELLED | OUTPATIENT
Start: 2025-01-07

## 2025-01-07 RX ORDER — IBUPROFEN 400 MG/1
800 TABLET, FILM COATED ORAL 3 TIMES DAILY PRN
Status: CANCELLED | OUTPATIENT
Start: 2025-01-10

## 2025-01-07 RX ORDER — ACETAMINOPHEN 500 MG
1000 TABLET ORAL EVERY 6 HOURS
Status: CANCELLED | OUTPATIENT
Start: 2025-01-07 | End: 2025-01-12

## 2025-01-07 RX ORDER — ASPIRIN 81 MG/1
81 TABLET ORAL 2 TIMES DAILY
Status: CANCELLED | OUTPATIENT
Start: 2025-01-07

## 2025-01-07 RX ORDER — HYDROMORPHONE HYDROCHLORIDE 1 MG/ML
0.5 INJECTION, SOLUTION INTRAMUSCULAR; INTRAVENOUS; SUBCUTANEOUS
Status: CANCELLED | OUTPATIENT
Start: 2025-01-07

## 2025-01-07 RX ORDER — HYDROMORPHONE HYDROCHLORIDE 2 MG/ML
INJECTION, SOLUTION INTRAMUSCULAR; INTRAVENOUS; SUBCUTANEOUS PRN
Status: DISCONTINUED | OUTPATIENT
Start: 2025-01-07 | End: 2025-01-07 | Stop reason: SURG

## 2025-01-07 RX ORDER — POLYETHYLENE GLYCOL 3350 17 G/17G
1 POWDER, FOR SOLUTION ORAL 2 TIMES DAILY PRN
Status: CANCELLED | OUTPATIENT
Start: 2025-01-07

## 2025-01-07 RX ORDER — SCOPOLAMINE 1 MG/3D
1 PATCH, EXTENDED RELEASE TRANSDERMAL
Status: CANCELLED | OUTPATIENT
Start: 2025-01-07

## 2025-01-07 RX ORDER — ALBUTEROL SULFATE 5 MG/ML
2.5 SOLUTION RESPIRATORY (INHALATION)
Status: DISCONTINUED | OUTPATIENT
Start: 2025-01-07 | End: 2025-01-07 | Stop reason: HOSPADM

## 2025-01-07 RX ORDER — ACETAMINOPHEN 500 MG
1000 TABLET ORAL ONCE
Status: COMPLETED | OUTPATIENT
Start: 2025-01-07 | End: 2025-01-07

## 2025-01-07 RX ORDER — TRANEXAMIC ACID 100 MG/ML
INJECTION, SOLUTION INTRAVENOUS PRN
Status: DISCONTINUED | OUTPATIENT
Start: 2025-01-07 | End: 2025-01-07 | Stop reason: SURG

## 2025-01-07 RX ORDER — ATORVASTATIN CALCIUM 40 MG/1
40 TABLET, FILM COATED ORAL
Status: DISCONTINUED | OUTPATIENT
Start: 2025-01-07 | End: 2025-01-07 | Stop reason: HOSPADM

## 2025-01-07 RX ORDER — TAMSULOSIN HYDROCHLORIDE 0.4 MG/1
0.4 CAPSULE ORAL DAILY
Status: DISCONTINUED | OUTPATIENT
Start: 2025-01-07 | End: 2025-01-07 | Stop reason: HOSPADM

## 2025-01-07 RX ORDER — AMOXICILLIN 250 MG
1 CAPSULE ORAL NIGHTLY
Status: CANCELLED | OUTPATIENT
Start: 2025-01-07

## 2025-01-07 RX ORDER — DIPHENHYDRAMINE HYDROCHLORIDE 50 MG/ML
12.5 INJECTION INTRAMUSCULAR; INTRAVENOUS
Status: DISCONTINUED | OUTPATIENT
Start: 2025-01-07 | End: 2025-01-07 | Stop reason: HOSPADM

## 2025-01-07 RX ORDER — EPINEPHRINE 1 MG/ML(1)
AMPUL (ML) INJECTION
Status: DISCONTINUED | OUTPATIENT
Start: 2025-01-07 | End: 2025-01-07 | Stop reason: HOSPADM

## 2025-01-07 RX ORDER — CELECOXIB 200 MG/1
200 CAPSULE ORAL ONCE
Status: COMPLETED | OUTPATIENT
Start: 2025-01-07 | End: 2025-01-07

## 2025-01-07 RX ORDER — HYDROMORPHONE HYDROCHLORIDE 1 MG/ML
0.4 INJECTION, SOLUTION INTRAMUSCULAR; INTRAVENOUS; SUBCUTANEOUS
Status: DISCONTINUED | OUTPATIENT
Start: 2025-01-07 | End: 2025-01-07 | Stop reason: HOSPADM

## 2025-01-07 RX ORDER — ONDANSETRON 2 MG/ML
INJECTION INTRAMUSCULAR; INTRAVENOUS PRN
Status: DISCONTINUED | OUTPATIENT
Start: 2025-01-07 | End: 2025-01-07 | Stop reason: SURG

## 2025-01-07 RX ORDER — ONDANSETRON 2 MG/ML
4 INJECTION INTRAMUSCULAR; INTRAVENOUS EVERY 4 HOURS PRN
Status: CANCELLED | OUTPATIENT
Start: 2025-01-07

## 2025-01-07 RX ORDER — VANCOMYCIN HYDROCHLORIDE 1 G/20ML
INJECTION, POWDER, LYOPHILIZED, FOR SOLUTION INTRAVENOUS
Status: COMPLETED | OUTPATIENT
Start: 2025-01-07 | End: 2025-01-07

## 2025-01-07 RX ADMIN — LIDOCAINE HYDROCHLORIDE 50 MG: 20 INJECTION, SOLUTION EPIDURAL; INFILTRATION; INTRACAUDAL; PERINEURAL at 10:52

## 2025-01-07 RX ADMIN — TRANEXAMIC ACID 1000 MG: 100 INJECTION, SOLUTION INTRAVENOUS at 10:55

## 2025-01-07 RX ADMIN — FENTANYL CITRATE 50 MCG: 50 INJECTION, SOLUTION INTRAMUSCULAR; INTRAVENOUS at 10:51

## 2025-01-07 RX ADMIN — HYDROMORPHONE HYDROCHLORIDE 0.6 MG: 2 INJECTION INTRAMUSCULAR; INTRAVENOUS; SUBCUTANEOUS at 11:00

## 2025-01-07 RX ADMIN — SODIUM CHLORIDE, POTASSIUM CHLORIDE, SODIUM LACTATE AND CALCIUM CHLORIDE: 600; 310; 30; 20 INJECTION, SOLUTION INTRAVENOUS at 10:23

## 2025-01-07 RX ADMIN — HYDROMORPHONE HYDROCHLORIDE 0.6 MG: 2 INJECTION INTRAMUSCULAR; INTRAVENOUS; SUBCUTANEOUS at 11:42

## 2025-01-07 RX ADMIN — ROCURONIUM BROMIDE 40 MG: 50 INJECTION, SOLUTION INTRAVENOUS at 10:56

## 2025-01-07 RX ADMIN — ROPIVACAINE HYDROCHLORIDE 20 ML: 5 INJECTION EPIDURAL; INFILTRATION; PERINEURAL at 10:43

## 2025-01-07 RX ADMIN — HYDROMORPHONE HYDROCHLORIDE 0.4 MG: 2 INJECTION INTRAMUSCULAR; INTRAVENOUS; SUBCUTANEOUS at 11:37

## 2025-01-07 RX ADMIN — ROCURONIUM BROMIDE 10 MG: 50 INJECTION, SOLUTION INTRAVENOUS at 10:52

## 2025-01-07 RX ADMIN — SUCCINYLCHOLINE CHLORIDE 100 MG: 20 INJECTION, SOLUTION INTRAMUSCULAR; INTRAVENOUS at 10:52

## 2025-01-07 RX ADMIN — FENTANYL CITRATE 50 MCG: 50 INJECTION, SOLUTION INTRAMUSCULAR; INTRAVENOUS at 11:38

## 2025-01-07 RX ADMIN — ONDANSETRON 4 MG: 2 INJECTION INTRAMUSCULAR; INTRAVENOUS at 11:27

## 2025-01-07 RX ADMIN — MAGNESIUM SULFATE HEPTAHYDRATE 4 G: 2 INJECTION, SOLUTION INTRAVENOUS at 11:03

## 2025-01-07 RX ADMIN — DEXAMETHASONE SODIUM PHOSPHATE 8 MG: 4 INJECTION INTRA-ARTICULAR; INTRALESIONAL; INTRAMUSCULAR; INTRAVENOUS; SOFT TISSUE at 10:52

## 2025-01-07 RX ADMIN — CELECOXIB 200 MG: 200 CAPSULE ORAL at 10:23

## 2025-01-07 RX ADMIN — ACETAMINOPHEN 1000 MG: 500 TABLET ORAL at 10:22

## 2025-01-07 RX ADMIN — PROPOFOL 200 MG: 10 INJECTION, EMULSION INTRAVENOUS at 10:52

## 2025-01-07 RX ADMIN — SUGAMMADEX 200 MG: 100 INJECTION, SOLUTION INTRAVENOUS at 11:44

## 2025-01-07 RX ADMIN — HYDROMORPHONE HYDROCHLORIDE 0.4 MG: 2 INJECTION INTRAMUSCULAR; INTRAVENOUS; SUBCUTANEOUS at 11:27

## 2025-01-07 RX ADMIN — METOCLOPRAMIDE HYDROCHLORIDE 10 MG: 5 INJECTION INTRAMUSCULAR; INTRAVENOUS at 10:52

## 2025-01-07 RX ADMIN — MIDAZOLAM HYDROCHLORIDE 1 MG: 1 INJECTION, SOLUTION INTRAMUSCULAR; INTRAVENOUS at 10:37

## 2025-01-07 RX ADMIN — CEFAZOLIN 2 G: 1 INJECTION, POWDER, FOR SOLUTION INTRAMUSCULAR; INTRAVENOUS at 10:55

## 2025-01-07 ASSESSMENT — GAIT ASSESSMENTS
GAIT LEVEL OF ASSIST: STANDBY ASSIST
DEVIATION: DECREASED HEEL STRIKE;DECREASED TOE OFF
ASSISTIVE DEVICE: FRONT WHEEL WALKER
DISTANCE (FEET): 75

## 2025-01-07 ASSESSMENT — COGNITIVE AND FUNCTIONAL STATUS - GENERAL
TURNING FROM BACK TO SIDE WHILE IN FLAT BAD: A LITTLE
CLIMB 3 TO 5 STEPS WITH RAILING: A LITTLE
MOBILITY SCORE: 18
WALKING IN HOSPITAL ROOM: A LITTLE
MOVING FROM LYING ON BACK TO SITTING ON SIDE OF FLAT BED: A LITTLE
STANDING UP FROM CHAIR USING ARMS: A LITTLE
MOVING TO AND FROM BED TO CHAIR: A LITTLE
SUGGESTED CMS G CODE MODIFIER MOBILITY: CK

## 2025-01-07 ASSESSMENT — PAIN DESCRIPTION - PAIN TYPE
TYPE: SURGICAL PAIN
TYPE: SURGICAL PAIN

## 2025-01-07 ASSESSMENT — FIBROSIS 4 INDEX: FIB4 SCORE: 1.16

## 2025-01-07 NOTE — OR NURSING
1330 Pt wife updated on progress, POC and ETA for DC    1347 Pt states pain is controled and tolerable, denies nausea. Pt tolerating PO fluids. Pt passes BD gait assessment.  Pt to phase II via ambulation with CNA SBA and FWW.

## 2025-01-07 NOTE — ANESTHESIA TIME REPORT
Anesthesia Start and Stop Event Times       Date Time Event    1/7/2025 1034 Ready for Procedure     1048 Anesthesia Start     1152 Anesthesia Stop          Responsible Staff  01/07/25      Name Role Begin End    Marlene Andrade M.D. Anesth 1048 1152          Overtime Reason:  no overtime (within assigned shift)    Comments:

## 2025-01-07 NOTE — H&P
"Surgery Orthopedic History & Physical Note    Date  1/7/2025    Primary Care Physician  BILL Butterfield      Pre-Op Diagnosis Codes:      * Primary osteoarthritis of right knee [M17.11]    HPI  This is a 74 y.o. male who presents for a TKA.    Past Medical History:   Diagnosis Date    Apnea, sleep     Arrhythmia 09/02/2021    BPH associated with nocturia     Chickenpox     Frequent urination     [place] measles     High cholesterol     Hypertension     Seizure (HCC)     approx 2021 \"happens while asleep\" uses CPAP    Snoring     Wears glasses        Past Surgical History:   Procedure Laterality Date    APPENDECTOMY  1990    TONSILLECTOMY         Current Facility-Administered Medications   Medication Dose Route Frequency Provider Last Rate Last Admin    acetaminophen (Tylenol) tablet 1,000 mg  1,000 mg Oral Once Marlene Andrade M.D.        celecoxib (CeleBREX) capsule 200 mg  200 mg Oral Once Marlene Andrade M.D.        amLODIPine (Norvasc) tablet 5 mg  5 mg Oral DAILY Jese Stephens M.D.        atorvastatin (Lipitor) tablet 40 mg  40 mg Oral QHS Jese Stephens M.D.        levetiracetam (Keppra) ER TABLET 500 mg  500 mg Oral DAILY Jese Stephens M.D.        lisinopril (Prinivil) TABS 40 mg  40 mg Oral DAILY Jese Stephens M.D.        tamsulosin (Flomax) capsule 0.4 mg  0.4 mg Oral DAILY Jese Stephens M.D.        lidocaine (Xylocaine) 1 % injection 0.5 mL  0.5 mL Intradermal Once PRN Jese Stephens M.D.        lactated ringers infusion   Intravenous Continuous Jese Stephens M.D.        ceFAZolin (Ancef) injection 2 g  2 g Intravenous Once Kemar Camacho P.A.-C.           Social History     Socioeconomic History    Marital status:      Spouse name: Not on file    Number of children: Not on file    Years of education: Not on file    Highest education level: Bachelor's degree (e.g., BA, AB, BS)   Occupational History    Occupation: retired   Tobacco Use    Smoking status: Former     Current packs/day: " 0.00     Types: Cigarettes     Start date: 1970     Quit date: 1979     Years since quittin.0    Smokeless tobacco: Never    Tobacco comments:     0.5 x 5 yrs, stopped at age 26   Vaping Use    Vaping status: Never Used   Substance and Sexual Activity    Alcohol use: Yes     Comment: occ    Drug use: Never    Sexual activity: Yes     Partners: Female     Comment:    Other Topics Concern    Not on file   Social History Narrative    Not on file     Social Drivers of Health     Financial Resource Strain: Low Risk  (12/3/2022)    Overall Financial Resource Strain (CARDIA)     Difficulty of Paying Living Expenses: Not hard at all   Food Insecurity: No Food Insecurity (12/3/2022)    Hunger Vital Sign     Worried About Running Out of Food in the Last Year: Never true     Ran Out of Food in the Last Year: Never true   Transportation Needs: No Transportation Needs (12/3/2022)    PRAPARE - Transportation     Lack of Transportation (Medical): No     Lack of Transportation (Non-Medical): No   Physical Activity: Insufficiently Active (12/3/2022)    Exercise Vital Sign     Days of Exercise per Week: 2 days     Minutes of Exercise per Session: 30 min   Stress: No Stress Concern Present (12/3/2022)    [employer] [place] [employer] - Occupational Stress Questionnaire     Feeling of Stress : Only a little   Social Connections: Socially Integrated (12/3/2022)    Social Connection and Isolation Panel [NHANES]     Frequency of Communication with Friends and Family: Twice a week     Frequency of Social Gatherings with Friends and Family: Once a week     Attends Druze Services: More than 4 times per year     Active Member of Clubs or Organizations: Yes     Attends Club or Organization Meetings: 1 to 4 times per year     Marital Status:    Intimate Partner Violence: Not on file   Housing Stability: Unknown (12/3/2022)    Housing Stability Vital Sign     Unable to Pay for Housing in the Last Year:  Patient refused     Number of Places Lived in the Last Year: 1     Unstable Housing in the Last Year: No       Family History   Problem Relation Age of Onset    Cancer Mother 40        br ca    Heart Disease Mother     Cancer Father     Cancer Brother 61        brain ca       Allergies  Patient has no known allergies.    Review of Systems  Negative    Physical Exam  Regular rate and rhythm  Nonlabored breathing  Abdomen soft and nontender   Neurovascular intact distally  Skin is in good condition    Vital Signs  Blood Pressure : (!) 150/67   Temperature: 36.8 °C (98.3 °F)   Pulse: (!) 58   Respiration: 16   Pulse Oximetry: 97 %       Labs:                    Radiology:  No orders to display         Assessment/Plan:  Pre-Op Diagnosis Codes:      * Primary osteoarthritis of right knee [M17.11]  Procedure(s):  ROBOTIC RIGHT TOTAL KNEE ARTHROPLASTY

## 2025-01-07 NOTE — LETTER
October 24, 2024    Patient Name: Ravindra Raines  Surgeon Name: Jese Stephens M.D.  Surgery Facility: [facility] ([address] [place])  Surgery Date: 1/7/2025    The time of your surgery is not final and may change up to and until the day of your surgery. You will be contacted 24-48 hours prior to your surgery date with your check-in and surgery time.    If you will not be at one of the below numbers please call the surgery scheduler at [phone]  Preferred Phone: [phone]    BEFORE YOUR SURGERY   Pre Registration and/or Lab Work must be done within and no earlier than 28 days prior to your surgery date. Your scheduled facility will contact you regarding all required preregistration and/or lab work. If you have not been contacted within 7 days of your scheduled procedure please call [facility] at [phone] for an appointment as soon as possible.    DAY OF YOUR SURGERY  Nothing to eat or drink after midnight     Refrain from smoking any substance after midnight prior to surgery. Smoking may interfere with the anesthetic and frequently produces nausea during the recovery period.    Continue taking all lifesaving medications. Including the morning of your surgery with small sip of water.    Please do NOT take on the day of surgery:  Diuretics: examples- furosemide (Lasix), spironolactone, hydrochlorothiazide  ACE-inhibitors: examples- lisinopril, ramipril, enalapril  “ARBs”: examples- losartan, Olmesartan, valsartan    Please arrive at the hospital/surgery center at the check-in time provided.     An adult will need to bring you and take you home after your surgery.     AFTER YOUR SURGERY  Post op Appointment:   Date: 01/22/2025   Time: 10:45am   With: Kemar Camacho PA-C   Location: [address] [place] [address] [place] [address]    - Therapy- Your first appointment should be 4-5  day(s) after your surgery. For your convenience we have 4 Physical Therapy  locations: [facility], [place], and [facility]. Call our office ASAP to schedule an appointment at [phone] or take the enclosed Therapy Prescription to a facility of your choice.  - No dental work for 3-6 months after your surgery.  - Post Surgery - You will need someone to drive you home  - Post Surgery - You will need someone to stay with you for 24 hours  - You must have someone provide transportation post surgery and someone to monitor you for at least 24 hours post-surgery. If you don't have either of these your appointment will be canceled.     TIME OFF WORK  FMLA or Disability forms can be faxed directly to: [phone] or you may drop them off at [address] [place] [address]. Our office charges a $35.00 fee per form. Forms will be completed within 10 business days of drop off and payment received. For the status of your forms you may contact our disability office directly [phone].    MEDICATION INSTRUCTIONS **Please read section completely**  The following medications should be stopped a minimum of 10 days prior to surgery:  All over the counter, Supplements & Herbal medications    Anorectics: Phentermine (Adipex-P, Lomaira and Suprenza), Phentermine-topiramate (Qsymia), Bupropion-naltrexone (Contrave)    **If you are on Bupropion for anxiety/depression, please continue this medication up until the day of surgery.     Opiod Partial Agonists/Opioid Antagonists: Buprenorphine (Suboxone, Belbuca, Butrans, Probuphine Implant, Sublocade), Naltrexone (ReVia, Vivitrol), Naloxone    Amphetamines: Dextroamphetamine/Amphetamine (Adderall, Mydayis), Methylphenidate Hydrochloride (Concerta, Metadate, Methylin, Ritalin)    The following medications should be stopped 5 days prior to surgery:  Blood Thinners: Any Aspirin, Aspirin products, anti-inflammatories such as ibuprofen and any blood thinners such as Coumadin and Plavix. Please consult your prescribing physician if you are on life  saving blood thinners, in regards to when to stop medications prior to surgery.     The following medications should be stopped a minimum of 3 days prior to surgery:  PDE-5 inhibitors: Sildenafil (Viagra), Tadalafil (Cialis), Vardenafil (Levitra), Avanafil (Stendra)    MAO Inhibitors: Rasagiline (Azilect), Selegiline (Eldepryl, Emsam, Selapar), Isocarboxazid (Marplan), Phenelzine (Nardil)

## 2025-01-07 NOTE — THERAPY
Physical Therapy   Initial Evaluation     Patient Name: Ravindra Raines  Age:  74 y.o., Sex:  male  Medical Record #: [id number]  Today's Date: 1/7/2025     Precautions  Precautions: Weight Bearing As Tolerated Right Lower Extremity    Assessment  Patient is 74 y.o. male s/p right TKA POD #0.  Pt agreeable to therapy evaluation. Pt is able to ambulate safely with FWW, stair training completed. Pt was provided with education on elevation, icing, positioning, and supine/seated therapeutic exercises. HEP handout issued, pt able to return demo all exercises.  Pt has support at home and has no further acute skilled PT needs at this time. Anticipate pt to d/c home once medically clear with recs for FWW use and OP therapy services.     Plan    Physical Therapy Initial Treatment Plan   Duration: Evaluation only    DC Equipment Recommendations: None  Discharge Recommendations: Recommend outpatient physical therapy services to address higher level deficits        01/07/25 1440   Prior Living Situation   Housing / Facility 1 Story House   Steps Into Home 2  (1+1 platform step)   Steps In Home 0   Bathroom Set up Walk In Shower   Equipment Owned Front-Wheel Walker;Raised Toilet Seat Without Arms;Single Point Cane   Lives with - Patient's Self Care Capacity Spouse   Comments Pt lives with supportive spouse   Prior Level of Functional Mobility   Bed Mobility Independent   Transfer Status Independent   Ambulation Independent   Assistive Devices Used None   Stairs Independent   Cognition    Level of Consciousness Alert   Comments Oriented x4   Strength Upper Body   Upper Body Strength  WDL   Passive ROM Lower Body   Passive ROM Lower Body X   Comments R knee 0-90 deg   Active ROM Lower Body    Active ROM Lower Body  X   Comments R knee 0-80 deg   Strength Lower Body   Lower Body Strength  X   Comments R knee limited by pain   Sensation Lower Body   Lower Extremity Sensation   WDL   Balance Assessment   Sitting Balance (Static) Good    Sitting Balance (Dynamic) Fair +   Standing Balance (Static) Fair +   Standing Balance (Dynamic) Fair   Weight Shift Sitting Good   Weight Shift Standing Fair   Comments stdg with FWW   Bed Mobility    Comments NT, pt sitting up in chair pre and post   Gait Analysis   Gait Level Of Assist Standby Assist   Assistive Device Front Wheel Walker   Distance (Feet) 75   # of Times Distance was Traveled 1   Deviation Decreased Heel Strike;Decreased Toe Off   # of Stairs Climbed 1   Level of Assist with Stairs Contact Guard Assist   Weight Bearing Status WBAT R LE   Functional Mobility   Sit to Stand Standby Assist   Bed, Chair, Wheelchair Transfer Standby Assist   Transfer Method Stand Step

## 2025-01-07 NOTE — OR NURSING
1505- Discharge instructions discussed with pt and wife.  All questions answered at this time.      1515- IV dc with tip intact.  Pt dc home with all belongings.

## 2025-01-07 NOTE — ANESTHESIA POSTPROCEDURE EVALUATION
Patient: Ravindra Raines    Procedure Summary       Date: 01/07/25 Room / Location:  OR  / SURGERY [facility]    Anesthesia Start: 1048 Anesthesia Stop: 1152    Procedure: ROBOTIC RIGHT TOTAL KNEE ARTHROPLASTY (Right: Knee) Diagnosis:       Primary osteoarthritis of right knee      (Primary osteoarthritis of right knee)    Surgeons: Jese Stephens M.D. Responsible Provider: Marlene Andrade M.D.    Anesthesia Type: general, peripheral nerve block ASA Status: 2            Final Anesthesia Type: general, peripheral nerve block  Last vitals  BP   Blood Pressure : 136/60    Temp   36.5 °C (97.7 °F)    Pulse   62   Resp   (!) 26    SpO2   95 %      Anesthesia Post Evaluation    Patient location during evaluation: PACU  Patient participation: complete - patient participated  Level of consciousness: awake and alert    Airway patency: patent  Anesthetic complications: no  Cardiovascular status: hemodynamically stable  Respiratory status: acceptable  Hydration status: euvolemic    PONV: none          No notable events documented.     Nurse Pain Score: 0 (NPRS)

## 2025-01-07 NOTE — ANESTHESIA PROCEDURE NOTES
Airway    Date/Time: 1/7/2025 10:53 AM    Performed by: Marlene Andrade M.D.  Authorized by: Marlene Andrade M.D.    Location:  OR  Urgency:  Elective  Indications for Airway Management:  Anesthesia      Spontaneous Ventilation: absent    Sedation Level:  Deep  Preoxygenated: Yes    Patient Position:  Sniffing  Mask Difficulty Assessment:  0 - not attempted  Final Airway Type:  Endotracheal airway  Final Endotracheal Airway:  ETT  Cuffed: Yes    Technique Used for Successful ETT Placement:  Direct laryngoscopy    Insertion Site:  Oral  Blade Type:  Glide  Laryngoscope Blade/Videolaryngoscope Blade Size:  3  ETT Size (mm):  7.0  Measured from:  Teeth  ETT to Teeth (cm):  23  Placement Verified by: auscultation and capnometry    Cormack-Lehane Classification:  Grade I - full view of glottis  Number of Attempts at Approach:  1

## 2025-01-07 NOTE — ANESTHESIA PROCEDURE NOTES
Peripheral Block    Date/Time: 1/7/2025 10:43 AM    Performed by: Marlene Andrade M.D.  Authorized by: Marlene Andrade M.D.    Start Time:  1/7/2025 10:43 AM  Reason for Block: at surgeon's request and post-op pain management ONLY    patient identified, IV checked, site marked, risks and benefits discussed, surgical consent, monitors and equipment checked, pre-op evaluation and timeout performed    Patient Position:  Supine  Prep: ChloraPrep    Monitoring:  Heart rate, continuous pulse ox and cardiac monitor  Block Region:  Lower Extremity  Lower Extremity - Block Type:  Selective FEMORAL nerve block at the Adductor Canal    Laterality:  Right  Procedures: ultrasound guided  Image captured, interpreted and electronically stored.  Local Infiltration:  Lidocaine  Strength:  1 %  Dose:  3 ml  Block Type:  Single-shot  Needle Localization:  Ultrasound guidance  Ultrasound picture in chart  Injection Assessment:  Negative aspiration for heme, no paresthesia on injection, incremental injection and local visualized surrounding nerve on ultrasound  Evidence of intravascular injection: No

## 2025-01-07 NOTE — OP REPORT
Preop Diagnosis: Advanced right knee arthritis  Postop Diagnosis:  Same  Procedure: Right total knee arthroplasty, Use of intraoperative robotic navigation for knee replacement  Surgeon: Dr. Jese Stephens MD  Assistant: Kemar Camacho PA-C  Anesthesia: Dr. Andrade. General + Adductor canal block  Estimated Blood Loss: 50cc  Drains: None  Complications: None    Implants: [place] [employer] CR Cementless, size 5 femur, size 6 tibia, with an 11 mm CS insert.    Indications: Ravindra Raines is a 74 y.o. male who has had severe progressive knee pain that failed conservative management.  There were severe degenerative changes on radiographs.  We discussed the options and he was ultimately indicated for knee replacement.  We discussed the risk of bleeding, transfusion, pain, neurovascular injury, stiffness, fracture, infection, wound complication, loosening of the parts over time, blood clots, and medical complication.  No guarantees were made and he elected to proceed.    Pertinent Findings and Releases: There were severe degenerative changes most pronounced medially.  His patella looked ok, so wasn't resurfaced.  I removed a bone ossicle deep to the patellar tendon.  At the end of the case, the knee easily came to full extension and flexed up to calf/thigh impingement with the arthrotomy closed.  The patella tracked centrally.    Informed consent and site marking were confirmed in preop.  An adductor canal block had been performed by the anesthesiologist, and then he was brought back to the OR where anesthesia was performed. Supine positioning was perfmored with all bony prominences well padded with a padded tourniquet on the thigh.  The extremity was prepped and draped in the usual sterile fashion. I performed a pre-procedure timeout to confirm we had the correct patient, side, site, procedure, and presence of necessary personal and equipment.  I confirmed that Ancef and tranexamic acid were given.  The tourniquet was then  inflated.    A midline incision was made medial to the tibial tubercle centered over patella taken down to the deep capsule of the knee. A short medial parapatellar arthrotomy was performed.  The fat pad was released. The patella was subluxated and the knee was flexed. The remnants of the anterior horn of the medial and lateral meniscus were removed.  Two pins were placed into the proximal tibia within the incision and two additional pins were placed into the femoral diaphysis through stab incisions proximal to the knee incision to dock the robotic sensors.  The knee was then registered and taken through a range of motion to gauge the ligament balance.  The surgical plan was then modified on the computer, and all of the bone cuts were made without any difficulties.  The knee was then tensed at 90 degrees and the meniscal remnants and posterior osteophytes were removed.  The posterior capsule was injected with a local anesthetic cocktail.  The tibia was then subluxed forward, sized, and punched in the appropriate amount of external rotation and mechanical alignment was verified using a drop javon. Trials were placed, the knee was reduced with a trial insert, it was taken through a range of motion, and found to be stable in the varus/valgus plane 0-30 degrees of flexion and the AP plane at 90 degrees of flexion. Attention was then turned to the patella.  The patella had good remaning cartilage, so I preformed a lateral facetectomy an bovied the circumference but did not resurface. With a trial in place, the patella tracked centrally using the no thumbs technique. All trial components were removed. The real components were impacted with a great press-fit.  The tourniquet was let down and hemostasis ensured. The real insert was placed. Stability and patellar tracking were excellent. The knee was then copiously irrigated. One gram of Vancomycin was left in the wound.  The arthrotomy was closed with number 2 running barbed  suture, and the wound was closed in layers. An occlusive silver dressing was applied and the patient was turned over to anesthesia in stable condition without any apparent intraoperative complications.    Plan:  WBAT with a walker (which will need to be provided if they do not already have one due to weakness, imbalance, and fall risk), PT/OT evaluation, Aspirin 81mg BID for 4 weeks for DVT prophylaxis, Leave dressing in place until followup.

## 2025-01-07 NOTE — OR NURSING
Patient allergies and NPO status verified, home medication reconciliation completed, belongings secured. Patient verbalizes understanding of pain scale, expected course of stay and plan of care. Surgical site verified with patient, IV access established sequentials placed on LLE.  Pt educated on benefits of pre-warming using [employer] Hujovany pt declines application of pre-warming in pre-op.

## 2025-01-07 NOTE — OR NURSING
1150- Pt arrived to PACU, report received. Pt on 6L mask.  R knee with dressing, CDI.  Pulses palpable with brisk refill observed. Ice pack provided and leg elevated on pillow.      1154- Pt tolerating sips of water.     1209- Report to PEBBLES Valdivia.

## 2025-01-07 NOTE — OR NURSING
1348: :Pt ambulatory to stage 2 w/ fww and tolerating well. Placed into recliner. R knee dressing CDI, pedal pulse +2.     1405: Family at bedside    :PT/OT at chairside working with pt.     :Patient education completed, family denies further questions.DC'd to care of family post uneventful stay in PACU 2. IV discontinued.     :Pt taken out via wc and placed into care of family

## 2025-01-07 NOTE — ANESTHESIA PREPROCEDURE EVALUATION
Case: [id number] Date/Time: 01/07/25 1145    Procedure: ROBOTIC RIGHT TOTAL KNEE ARTHROPLASTY (Right: Knee)    Anesthesia type: General    Diagnosis: Primary osteoarthritis of right knee [M17.11]    Pre-op diagnosis: Primary osteoarthritis of right knee    Location:  OR 05 / SURGERY [facility]    Surgeons: Jese Stephens M.D.            Relevant Problems   ANESTHESIA   (positive) Sleep apnea      NEURO   (positive) Seizure disorder (HCC)      CARDIAC   (positive) Aortic calcification (HCC)   (positive) Cardiac murmur   (positive) HTN (hypertension)   (positive) Mitral valve insufficiency      GI   (positive) Gastroesophageal reflux disease without esophagitis       Physical Exam    Airway   Mallampati: III  TM distance: >3 FB  Neck ROM: full       Cardiovascular - normal exam  Rhythm: regular  Rate: normal  (-) murmur     Dental - normal exam           Pulmonary - normal exam  Breath sounds clear to auscultation     Abdominal    Neurological - normal exam                   Anesthesia Plan    ASA 2       Plan - general and peripheral nerve block     Peripheral nerve block will be post-op pain control  Airway plan will be ETT          Induction: intravenous    Postoperative Plan: Postoperative administration of opioids is intended.    Pertinent diagnostic labs and testing reviewed    Informed Consent:    Anesthetic plan and risks discussed with patient.    Use of blood products discussed with: patient whom consented to blood products.

## 2025-01-14 ENCOUNTER — APPOINTMENT (OUTPATIENT)
Dept: MEDICAL GROUP | Facility: MEDICAL CENTER | Age: 75
End: 2025-01-14
Payer: MEDICARE

## 2025-01-18 DIAGNOSIS — R35.1 BPH ASSOCIATED WITH NOCTURIA: ICD-10-CM

## 2025-01-18 DIAGNOSIS — E78.5 HYPERLIPIDEMIA, UNSPECIFIED HYPERLIPIDEMIA TYPE: ICD-10-CM

## 2025-01-18 DIAGNOSIS — I15.9 SECONDARY HYPERTENSION: ICD-10-CM

## 2025-01-18 DIAGNOSIS — N40.1 BPH ASSOCIATED WITH NOCTURIA: ICD-10-CM

## 2025-01-20 DIAGNOSIS — I15.9 SECONDARY HYPERTENSION: ICD-10-CM

## 2025-01-20 NOTE — TELEPHONE ENCOUNTER
Received request via: Pharmacy    Was the patient seen in the last year in this department? Yes    Does the patient have an active prescription (recently filled or refills available) for medication(s) requested? No    [facility]    Does the patient have [facility] and need 100-day supply? (This applies to ALL medications) Patient does not have SCP

## 2025-01-21 RX ORDER — TAMSULOSIN HYDROCHLORIDE 0.4 MG/1
0.4 CAPSULE ORAL DAILY
Qty: 90 CAPSULE | Refills: 0 | Status: SHIPPED | OUTPATIENT
Start: 2025-01-21

## 2025-01-21 RX ORDER — LISINOPRIL 40 MG/1
40 TABLET ORAL DAILY
Qty: 90 TABLET | Refills: 1 | Status: SHIPPED | OUTPATIENT
Start: 2025-01-21

## 2025-01-21 RX ORDER — ATORVASTATIN CALCIUM 40 MG/1
40 TABLET, FILM COATED ORAL
Qty: 90 TABLET | Refills: 0 | Status: SHIPPED | OUTPATIENT
Start: 2025-01-21

## 2025-01-21 RX ORDER — LISINOPRIL 40 MG/1
40 TABLET ORAL DAILY
Qty: 90 TABLET | Refills: 0 | Status: SHIPPED | OUTPATIENT
Start: 2025-01-21

## 2025-04-03 ENCOUNTER — APPOINTMENT (OUTPATIENT)
Dept: SLEEP MEDICINE | Facility: MEDICAL CENTER | Age: 75
End: 2025-04-03
Attending: NURSE PRACTITIONER
Payer: MEDICARE

## 2025-04-03 VITALS
WEIGHT: 214 LBS | SYSTOLIC BLOOD PRESSURE: 130 MMHG | BODY MASS INDEX: 32.43 KG/M2 | OXYGEN SATURATION: 95 % | HEIGHT: 68 IN | RESPIRATION RATE: 16 BRPM | HEART RATE: 66 BPM | DIASTOLIC BLOOD PRESSURE: 54 MMHG

## 2025-04-03 DIAGNOSIS — G47.39 COMPLEX SLEEP APNEA SYNDROME: ICD-10-CM

## 2025-04-03 DIAGNOSIS — G47.34 NOCTURNAL HYPOXIA: ICD-10-CM

## 2025-04-03 PROCEDURE — 3075F SYST BP GE 130 - 139MM HG: CPT | Performed by: NURSE PRACTITIONER

## 2025-04-03 PROCEDURE — 99214 OFFICE O/P EST MOD 30 MIN: CPT | Performed by: NURSE PRACTITIONER

## 2025-04-03 PROCEDURE — 3078F DIAST BP <80 MM HG: CPT | Performed by: NURSE PRACTITIONER

## 2025-04-03 PROCEDURE — 99213 OFFICE O/P EST LOW 20 MIN: CPT | Performed by: NURSE PRACTITIONER

## 2025-04-03 ASSESSMENT — PATIENT HEALTH QUESTIONNAIRE - PHQ9: CLINICAL INTERPRETATION OF PHQ2 SCORE: 0

## 2025-04-03 ASSESSMENT — FIBROSIS 4 INDEX: FIB4 SCORE: 1.17

## 2025-04-03 NOTE — PROGRESS NOTES
Chief Complaint   Patient presents with    Follow-Up     SLEEP - ESTABLISHED PT CHART PREP COMPLETED ON 03/28/2025   Setup date: 02/17/2023  Last Office Visit 05/05/2023 with Jad Nguyen    DME: DME:  [employer] / ph [phone] / fx [phone]     PAP/O2/OAT:AirCurve 10 ASV   EPAP (cmH2O)  5.0  Min PS (cmH2O)  3.0  Max PS (cmH2O)  15.0     Ramp enable  On  Ramp time (min)  10  Start EPAP (cmH2O)  4.0    Wireless Data? YES [employer]       HPI:  Ravindra Raines is a 75 y.o. year old male here today for follow-up on complex sleep apnea on ASV with yearly compliance check. PMH includes BPH, hyperlipidemia, hypertension, mitral valve insufficiency, cardiac murmur, aortic calcification, complex sleep apnea, nocturnal hypoxia, chronic pansinusitis, GERD, hypertrophy of nasal turbinates, syncopal episode, former smoker, tonsillectomy, obesity.     Patient previously had problems with snoring and poor sleep quality which caused excessive daytime sleepiness.  He received his ASV device in February 2023.  He is currently using a nasal mask and denies any difficulty with mask fit or pressures.  He gets an average of 7 hours sleep and denies any difficulty falling or staying asleep.  Overall he notes significant improvement in sleep quality.  He is sleeping longer throughout the night and only waking up 1 time per night now.  He is no longer falling asleep during the day.  He notes improved energy levels.  He denies any excessive daytime sleepiness, morning headaches, palpitations, concentration or memory problems.    30-day compliance shows 100% use with an average time of 5 hours and 10 minutes and a residual AHI of 0.9 with no evidence of mask leak.      ROS: As per HPI and otherwise negative if not stated.    Past Medical History:   Diagnosis Date    Apnea, sleep     Arrhythmia 09/02/2021    BPH associated with nocturia     Chickenpox     Frequent urination     [place] measles     High cholesterol     Hypertension      "Seizure (HCC)     approx 2021 \"happens while asleep\" uses CPAP    Snoring     Wears glasses        Past Surgical History:   Procedure Laterality Date    ARTHROPLASTY, KNEE, ROBOT-ASSISTED Right 1/7/2025    Procedure: ROBOTIC RIGHT TOTAL KNEE ARTHROPLASTY;  Surgeon: Jese Stephens M.D.;  Location: SURGERY [facility];  Service: Ortho Robotic    APPENDECTOMY  1990    TONSILLECTOMY         Family History   Problem Relation Age of Onset    Cancer Mother 40        br ca    Heart Disease Mother     Cancer Father     Cancer Brother 61        brain ca       Allergies as of 04/03/2025    (No Known Allergies)        Vitals:  /54 (BP Location: Left arm, Patient Position: Sitting, BP Cuff Size: Adult)   Pulse 66   Resp 16   Ht 1.727 m (5' 8\")   Wt 97.1 kg (214 lb)   SpO2 95%     Current medications as of today   Current Outpatient Medications   Medication Sig Dispense Refill    lisinopril (PRINIVIL) 40 MG tablet TAKE 1 TABLET BY MOUTH EVERY DAY 90 Tablet 0    atorvastatin (LIPITOR) 40 MG Tab TAKE 1 TABLET BY MOUTH EVERYDAY AT BEDTIME 90 Tablet 0    tamsulosin (FLOMAX) 0.4 MG capsule TAKE 1 CAPSULE BY MOUTH EVERY DAY 90 Capsule 0    lisinopril (PRINIVIL) 40 MG tablet Take 1 Tablet by mouth every day. 90 Tablet 1    amLODIPine (NORVASC) 5 MG Tab TAKE 1 TABLET BY MOUTH EVERY DAY 90 Tablet 0    Cholecalciferol (VITAMIN D-3 PO) Take  by mouth.       No current facility-administered medications for this visit.         Physical Exam:   Gen:           Alert and oriented, No apparent distress. Mood and affect appropriate, normal interaction with examiner.  Eyes:          PERRL, EOM intact, sclere white, conjunctive moist.  Ears:          Not examined.   Hearing:     Grossly intact.  Nose:          Normal, no lesions or deformities.  Dentition:    Good dentition.  Oropharynx:   Tongue normal, posterior pharynx without erythema or exudate.  Neck:        Supple, trachea midline, no masses.  Respiratory Effort: No intercostal " retractions or use of accessory muscles.   Lung Auscultation:      Clear to auscultation bilaterally; no rales, rhonchi or wheezing.  CV:            Regular rate and rhythm. No murmurs, rubs or gallops.  Abd:           Not examined.   Lymphadenopathy: Not examined.  Gait and Station: Normal.  Digits and Nails: No clubbing, cyanosis, petechiae, or nodes.   Cranial Nerves: II-XII grossly intact.  Skin:        No rashes, lesions or ulcers noted.               Ext:           No cyanosis or edema.      Assessment:  1. Complex sleep apnea syndrome  DME Mask and Supplies      2. Nocturnal hypoxia  DME Mask and Supplies          Plan:  Using and benefiting from ASV therapy.  Compliance shows adequate use and control of SHIRA.  Order placed for mask and supplies.  Patient advised to follow-up in 1 year, but can be seen sooner if needed.    Please note that this dictation was created using voice recognition software. I have made every reasonable attempt to correct obvious errors, but it is possible there are errors of grammar and possibly content that I did not discover before finalizing the note.

## 2025-04-16 DIAGNOSIS — I15.9 SECONDARY HYPERTENSION: ICD-10-CM

## 2025-04-16 RX ORDER — AMLODIPINE BESYLATE 5 MG/1
5 TABLET ORAL DAILY
Qty: 90 TABLET | Refills: 0 | Status: SHIPPED | OUTPATIENT
Start: 2025-04-16

## 2025-04-16 NOTE — TELEPHONE ENCOUNTER
Received request via: Pharmacy    Was the patient seen in the last year in this department? Yes    Does the patient have an active prescription (recently filled or refills available) for medication(s) requested? No    Pharmacy Name: [facility]/pharmacy #[id number] - [place], [place] - [address]     Does the patient have snf Plus and need 100-day supply? (This applies to ALL medications) Patient does not have SCP

## 2025-04-19 DIAGNOSIS — E78.5 HYPERLIPIDEMIA, UNSPECIFIED HYPERLIPIDEMIA TYPE: ICD-10-CM

## 2025-04-19 DIAGNOSIS — N40.1 BPH ASSOCIATED WITH NOCTURIA: ICD-10-CM

## 2025-04-19 DIAGNOSIS — R35.1 BPH ASSOCIATED WITH NOCTURIA: ICD-10-CM

## 2025-04-22 RX ORDER — ATORVASTATIN CALCIUM 40 MG/1
40 TABLET, FILM COATED ORAL
Qty: 90 TABLET | Refills: 1 | Status: SHIPPED | OUTPATIENT
Start: 2025-04-22

## 2025-04-22 RX ORDER — TAMSULOSIN HYDROCHLORIDE 0.4 MG/1
0.4 CAPSULE ORAL DAILY
Qty: 90 CAPSULE | Refills: 0 | Status: SHIPPED | OUTPATIENT
Start: 2025-04-22

## 2025-05-17 SDOH — ECONOMIC STABILITY: INCOME INSECURITY: HOW HARD IS IT FOR YOU TO PAY FOR THE VERY BASICS LIKE FOOD, HOUSING, MEDICAL CARE, AND HEATING?: NOT HARD AT ALL

## 2025-05-17 SDOH — HEALTH STABILITY: PHYSICAL HEALTH: ON AVERAGE, HOW MANY MINUTES DO YOU ENGAGE IN EXERCISE AT THIS LEVEL?: 30 MIN

## 2025-05-17 SDOH — ECONOMIC STABILITY: INCOME INSECURITY: IN THE LAST 12 MONTHS, WAS THERE A TIME WHEN YOU WERE NOT ABLE TO PAY THE MORTGAGE OR RENT ON TIME?: NO

## 2025-05-17 SDOH — HEALTH STABILITY: PHYSICAL HEALTH: ON AVERAGE, HOW MANY DAYS PER WEEK DO YOU ENGAGE IN MODERATE TO STRENUOUS EXERCISE (LIKE A BRISK WALK)?: 3 DAYS

## 2025-05-17 SDOH — ECONOMIC STABILITY: FOOD INSECURITY: WITHIN THE PAST 12 MONTHS, THE FOOD YOU BOUGHT JUST DIDN'T LAST AND YOU DIDN'T HAVE MONEY TO GET MORE.: NEVER TRUE

## 2025-05-17 SDOH — ECONOMIC STABILITY: FOOD INSECURITY: WITHIN THE PAST 12 MONTHS, YOU WORRIED THAT YOUR FOOD WOULD RUN OUT BEFORE YOU GOT MONEY TO BUY MORE.: NEVER TRUE

## 2025-05-17 ASSESSMENT — SOCIAL DETERMINANTS OF HEALTH (SDOH)
HOW OFTEN DO YOU HAVE A DRINK CONTAINING ALCOHOL: MONTHLY OR LESS
DO YOU BELONG TO ANY CLUBS OR ORGANIZATIONS SUCH AS CHURCH GROUPS UNIONS, FRATERNAL OR ATHLETIC GROUPS, OR SCHOOL GROUPS?: YES
HOW MANY DRINKS CONTAINING ALCOHOL DO YOU HAVE ON A TYPICAL DAY WHEN YOU ARE DRINKING: PATIENT DECLINED
IN A TYPICAL WEEK, HOW MANY TIMES DO YOU TALK ON THE PHONE WITH FAMILY, FRIENDS, OR NEIGHBORS?: ONCE A WEEK
IN THE PAST 12 MONTHS, HAS THE ELECTRIC, GAS, OIL, OR WATER COMPANY THREATENED TO SHUT OFF SERVICE IN YOUR HOME?: NO
HOW OFTEN DO YOU HAVE SIX OR MORE DRINKS ON ONE OCCASION: NEVER
HOW OFTEN DO YOU GET TOGETHER WITH FRIENDS OR RELATIVES?: ONCE A WEEK
HOW OFTEN DO YOU ATTENT MEETINGS OF THE CLUB OR ORGANIZATION YOU BELONG TO?: MORE THAN 4 TIMES PER YEAR
IN A TYPICAL WEEK, HOW MANY TIMES DO YOU TALK ON THE PHONE WITH FAMILY, FRIENDS, OR NEIGHBORS?: ONCE A WEEK
HOW OFTEN DO YOU ATTEND CHURCH OR RELIGIOUS SERVICES?: MORE THAN 4 TIMES PER YEAR
DO YOU BELONG TO ANY CLUBS OR ORGANIZATIONS SUCH AS CHURCH GROUPS UNIONS, FRATERNAL OR ATHLETIC GROUPS, OR SCHOOL GROUPS?: YES
HOW HARD IS IT FOR YOU TO PAY FOR THE VERY BASICS LIKE FOOD, HOUSING, MEDICAL CARE, AND HEATING?: NOT HARD AT ALL
WITHIN THE PAST 12 MONTHS, YOU WORRIED THAT YOUR FOOD WOULD RUN OUT BEFORE YOU GOT THE MONEY TO BUY MORE: NEVER TRUE
HOW OFTEN DO YOU ATTEND CHURCH OR RELIGIOUS SERVICES?: MORE THAN 4 TIMES PER YEAR
HOW OFTEN DO YOU ATTENT MEETINGS OF THE CLUB OR ORGANIZATION YOU BELONG TO?: MORE THAN 4 TIMES PER YEAR
HOW OFTEN DO YOU GET TOGETHER WITH FRIENDS OR RELATIVES?: ONCE A WEEK

## 2025-05-17 ASSESSMENT — LIFESTYLE VARIABLES
HOW OFTEN DO YOU HAVE SIX OR MORE DRINKS ON ONE OCCASION: NEVER
SKIP TO QUESTIONS 9-10: 0
HOW MANY STANDARD DRINKS CONTAINING ALCOHOL DO YOU HAVE ON A TYPICAL DAY: PATIENT DECLINED
HOW OFTEN DO YOU HAVE A DRINK CONTAINING ALCOHOL: MONTHLY OR LESS
AUDIT-C TOTAL SCORE: -1

## 2025-05-21 ENCOUNTER — APPOINTMENT (OUTPATIENT)
Dept: MEDICAL GROUP | Facility: MEDICAL CENTER | Age: 75
End: 2025-05-21
Payer: MEDICARE

## 2025-05-21 VITALS
HEIGHT: 68 IN | SYSTOLIC BLOOD PRESSURE: 122 MMHG | OXYGEN SATURATION: 97 % | BODY MASS INDEX: 32.33 KG/M2 | WEIGHT: 213.29 LBS | HEART RATE: 66 BPM | DIASTOLIC BLOOD PRESSURE: 60 MMHG | TEMPERATURE: 98.5 F

## 2025-05-21 DIAGNOSIS — Z12.11 SCREEN FOR COLON CANCER: ICD-10-CM

## 2025-05-21 DIAGNOSIS — I15.9 SECONDARY HYPERTENSION: ICD-10-CM

## 2025-05-21 DIAGNOSIS — N40.1 BPH ASSOCIATED WITH NOCTURIA: ICD-10-CM

## 2025-05-21 DIAGNOSIS — L85.3 DRY SKIN: ICD-10-CM

## 2025-05-21 DIAGNOSIS — Z00.00 MEDICARE ANNUAL WELLNESS VISIT, SUBSEQUENT: ICD-10-CM

## 2025-05-21 DIAGNOSIS — I70.0 AORTIC CALCIFICATION (HCC): ICD-10-CM

## 2025-05-21 DIAGNOSIS — G47.30 SLEEP APNEA, UNSPECIFIED TYPE: ICD-10-CM

## 2025-05-21 DIAGNOSIS — G40.909 SEIZURE DISORDER (HCC): ICD-10-CM

## 2025-05-21 DIAGNOSIS — R35.1 BPH ASSOCIATED WITH NOCTURIA: ICD-10-CM

## 2025-05-21 DIAGNOSIS — E78.5 HYPERLIPIDEMIA, UNSPECIFIED HYPERLIPIDEMIA TYPE: ICD-10-CM

## 2025-05-21 DIAGNOSIS — R97.20 ELEVATED PSA: Primary | ICD-10-CM

## 2025-05-21 PROBLEM — R74.8 ELEVATED ALKALINE PHOSPHATASE LEVEL: Status: RESOLVED | Noted: 2023-08-11 | Resolved: 2025-05-21

## 2025-05-21 PROBLEM — R56.9 SEIZURE-LIKE ACTIVITY (HCC): Status: RESOLVED | Noted: 2022-10-21 | Resolved: 2025-05-21

## 2025-05-21 PROBLEM — R55 SYNCOPAL EPISODES: Status: RESOLVED | Noted: 2022-07-01 | Resolved: 2025-05-21

## 2025-05-21 PROBLEM — R63.5 WEIGHT GAIN: Status: RESOLVED | Noted: 2024-02-02 | Resolved: 2025-05-21

## 2025-05-21 PROBLEM — M17.11 PRIMARY OSTEOARTHRITIS OF RIGHT KNEE: Status: RESOLVED | Noted: 2024-09-13 | Resolved: 2025-05-21

## 2025-05-21 PROBLEM — T14.8XXA BRUISING: Status: RESOLVED | Noted: 2024-08-02 | Resolved: 2025-05-21

## 2025-05-21 PROBLEM — G93.41 ACUTE METABOLIC ENCEPHALOPATHY: Status: RESOLVED | Noted: 2022-10-21 | Resolved: 2025-05-21

## 2025-05-21 PROBLEM — G89.29 CHRONIC PAIN OF RIGHT KNEE: Status: RESOLVED | Noted: 2024-09-04 | Resolved: 2025-05-21

## 2025-05-21 PROBLEM — R73.09 ELEVATED HEMOGLOBIN A1C: Status: RESOLVED | Noted: 2019-11-13 | Resolved: 2025-05-21

## 2025-05-21 PROBLEM — M25.561 CHRONIC PAIN OF RIGHT KNEE: Status: RESOLVED | Noted: 2024-09-04 | Resolved: 2025-05-21

## 2025-05-21 PROBLEM — L70.9 ACNE: Status: RESOLVED | Noted: 2023-12-08 | Resolved: 2025-05-21

## 2025-05-21 PROBLEM — E66.01 MORBID (SEVERE) OBESITY DUE TO EXCESS CALORIES (HCC): Status: RESOLVED | Noted: 2023-05-11 | Resolved: 2025-05-21

## 2025-05-21 PROBLEM — S09.93XA INJURY OF TONGUE: Status: RESOLVED | Noted: 2023-02-14 | Resolved: 2025-05-21

## 2025-05-21 PROBLEM — Z09 HOSPITAL DISCHARGE FOLLOW-UP: Status: RESOLVED | Noted: 2022-10-26 | Resolved: 2025-05-21

## 2025-05-21 PROBLEM — R06.83 SNORING: Status: RESOLVED | Noted: 2021-02-12 | Resolved: 2025-05-21

## 2025-05-21 PROBLEM — L84 CALLUS OF HEEL: Status: RESOLVED | Noted: 2021-11-19 | Resolved: 2025-05-21

## 2025-05-21 PROBLEM — J30.2 SEASONAL ALLERGIC RHINITIS: Status: RESOLVED | Noted: 2019-11-13 | Resolved: 2025-05-21

## 2025-05-21 PROCEDURE — 3074F SYST BP LT 130 MM HG: CPT | Performed by: NURSE PRACTITIONER

## 2025-05-21 PROCEDURE — G0439 PPPS, SUBSEQ VISIT: HCPCS | Performed by: NURSE PRACTITIONER

## 2025-05-21 PROCEDURE — 3078F DIAST BP <80 MM HG: CPT | Performed by: NURSE PRACTITIONER

## 2025-05-21 RX ORDER — LEVETIRACETAM 500 MG/1
500 TABLET, FILM COATED, EXTENDED RELEASE ORAL DAILY
COMMUNITY

## 2025-05-21 ASSESSMENT — ACTIVITIES OF DAILY LIVING (ADL): BATHING_REQUIRES_ASSISTANCE: 0

## 2025-05-21 ASSESSMENT — PATIENT HEALTH QUESTIONNAIRE - PHQ9: CLINICAL INTERPRETATION OF PHQ2 SCORE: 0

## 2025-05-21 ASSESSMENT — ENCOUNTER SYMPTOMS: GENERAL WELL-BEING: GOOD

## 2025-05-21 ASSESSMENT — FIBROSIS 4 INDEX: FIB4 SCORE: 1.17

## 2025-05-21 NOTE — PROGRESS NOTES
Chief Complaint   Patient presents with    Annual Exam    Requesting Labs     PSA       HPI:  Ravindra Raines is a 75 y.o. here for Medicare Annual Wellness Visit     Patient Active Problem List    Diagnosis Date Noted    Dry skin 05/21/2025    Elevated PSA 05/21/2025    Medicare annual wellness visit, subsequent 05/21/2025    Osteoarthritis, knee 09/13/2024    Tendinopathy of lower extremity 09/04/2024    Decreased hearing of both ears 12/08/2023    Body mass index (BMI) 38.0-38.9, adult 05/11/2023    Seizure disorder (HCC) 04/18/2023    Sleep apnea 04/18/2023    Nasal congestion 12/19/2022    Hypertrophy of nasal turbinates 12/19/2022    Chronic pansinusitis 12/19/2022    Chronic right-sided thoracic back pain 12/07/2022    Gastroesophageal reflux disease without esophagitis 10/26/2022    Benign prostatic hyperplasia with urinary frequency 10/26/2022    Aortic calcification (HCC) 10/20/2022    Mitral valve insufficiency 01/08/2021    Cardiac murmur 12/09/2020    BPH associated with nocturia 10/24/2019    Sacral back pain 10/24/2019    Hyperlipidemia 10/24/2019    HTN (hypertension) 10/24/2019    Vitamin D deficiency 10/24/2019       Current Medications[1]       Current supplements as per medication list.     Allergies: Patient has no known allergies.    Current social contact/activities: , active, independent with ADLs     He  reports that he quit smoking about 46 years ago. His smoking use included cigarettes. He started smoking about 55 years ago. He has never been exposed to tobacco smoke. He has never used smokeless tobacco. He reports current alcohol use. He reports that he does not use drugs.  Counseling given: Not Answered  Tobacco comments: 0.5 x 5 yrs, stopped at age 26      ROS:    Gait: Uses no assistive device  Ostomy: No  Other tubes: No  Amputations: No  Chronic oxygen use: No  Last eye exam: never  Wears hearing aids: No   : Denies any urinary leakage during the last 6  months    Screening:    Depression Screening  Little interest or pleasure in doing things?  0 - not at all  Feeling down, depressed , or hopeless? 0 - not at all  Patient Health Questionnaire Score: 0     If depressive symptoms identified deferred to follow up visit unless specifically addressed in assessment and plan.    Interpretation of PHQ-9 Total Score   Score Severity   1-4 No Depression   5-9 Mild Depression   10-14 Moderate Depression   15-19 Moderately Severe Depression   20-27 Severe Depression    Screening for Cognitive Impairment  Do you or any of your friends or family members have any concern about your memory? No  Three Minute Recall (Village, Kitchen, Baby) 3/3    Zay clock face with all 12 numbers and set the hands to show 10 minutes past 11.  Yes    Cognitive concerns identified deferred for follow up unless specifically addressed in assessment and plan.    Fall Risk Assessment  Has the patient had two or more falls in the last year or any fall with injury in the last year?  No    Safety Assessment  Do you always wear your seatbelt?  Yes  Any changes to home needed to function safely? No  Difficulty hearing.  Yes  Patient counseled about all safety risks that were identified.    Functional Assessment ADLs  Are there any barriers preventing you from cooking for yourself or meeting nutritional needs?  No.    Are there any barriers preventing you from driving safely or obtaining transportation?  No.    Are there any barriers preventing you from using a telephone or calling for help?  No    Are there any barriers preventing you from shopping?  No.    Are there any barriers preventing you from taking care of your own finances?  No    Are there any barriers preventing you from managing your medications?  No    Are there any barriers preventing you from showering, bathing or dressing yourself? No    Are there any barriers preventing you from doing housework or laundry? No  Are there any barriers  preventing you from using the toilet?No  Are you currently engaging in any exercise or physical activity?  Yes.      Self-Assessment of Health  What is your perception of your health? Good    Do you sleep more than six hours a night? No    In the past 7 days, how much did pain keep you from doing your normal work? None    Do you spend quality time with family or friends (virtually or in person)? Yes    Do you usually eat a heart healthy diet that constists of a variety of fruits, vegetables, whole grains and fiber? Yes    Do you eat foods high in fat and/or Fast Food more than three times per week? No    How concerned are you that your medical conditions are not being well managed? Not at all    Are you worried that in the next 2 months, you may not have stable housing that you own, rent, or stay in as part of a household? No      Advance Care Planning  Do you have an Advance Directive, Living Will, Durable Power of , or POLST?                   Health Maintenance Summary            Needs Review       Hepatitis C Screening  Tentatively Complete      11/06/2019  Hepatitis C Antibody component of Hep C Virus Antibody                      Awaiting Completion       Colorectal Cancer Screening (View Topic Details) Order placed this encounter      05/21/2025  Order placed for Cologuard® colon cancer screening by BILL [facility]                      Upcoming       Influenza Vaccine (Season Ended) Next due on 9/1/2025      09/15/2023  Outside Immunization: Influenza Quad Adjuvanted    09/27/2022  Imm Admin: Influenza Vaccine, Quadrivalent, Adjuvanted (Pf)    09/26/2022  Imm Admin: Influenza, Unspecified - HISTORICAL DATA    11/19/2021  Imm Admin: Influenza Vaccine Adult HD    10/20/2020  Imm Admin: Influenza Vaccine Adult HD     Only the first 5 history entries have been loaded, but more history exists.            Annual Wellness Visit (Yearly) Next due on 5/21/2026 05/21/2025  Prob Dx: Medicare  annual wellness visit, subsequent    05/21/2025  Visit Dx: Medicare annual wellness visit, subsequent    02/02/2024  Visit Dx: Medicare annual wellness visit, subsequent    12/06/2022  Visit Dx: Medicare annual wellness visit, subsequent    12/09/2020  Level of Service: NH PREVENTIVE VISIT,EST,65 & OVER      Only the first 5 history entries have been loaded, but more history exists.              IMM DTaP/Tdap/Td Vaccine (2 - Td or Tdap) Next due on 12/9/2030 12/09/2020  Imm Admin: Tdap Vaccine                      Completed or No Longer Recommended       Zoster (Shingles) Vaccines (Series Information) Completed      03/26/2024  Imm Admin: Zoster Vaccine Recombinant (RZV) (SHINGRIX)    10/19/2023  Imm Admin: Zoster Vaccine Recombinant (RZV) (SHINGRIX)    12/09/2020  Imm Admin: Zoster Vaccine Recombinant (RZV) (SHINGRIX)              Abdominal Aortic Aneurysm (AAA) Screening  Completed      09/19/2023  US-ABDOMINAL AORTA W/O DOPPLER              Pneumococcal Vaccine: 50+ Years (Series Information) Completed      12/07/2022  Imm Admin: Pneumococcal Conjugate Vaccine (PCV20)    03/11/2020  Imm Admin: Pneumococcal Conjugate Vaccine (Prevnar/PCV-13)              Hepatitis A Vaccine (Hep A) (Series Information) Aged Out      No completion history exists for this topic.              Hepatitis B Vaccine (Hep B) (Series Information) Aged Out     No completion history exists for this topic.              HPV Vaccines (Series Information) Aged Out     No completion history exists for this topic.              Polio Vaccine (Inactivated Polio) (Series Information) Aged Out     No completion history exists for this topic.              Meningococcal Immunization (Series Information) Aged Out     No completion history exists for this topic.              Meningococcal B Vaccine (Series Information) Aged Out     No completion history exists for this topic.              COVID-19 Vaccine  Discontinued      04/04/2021  Imm Admin:  "MODERNA SARS-COV-2 VACCINE (12+)    03/07/2021  Imm Admin: MODERNA SARS-COV-2 VACCINE (12+)                            Patient Care Team:  BI[facility] as PCP - General (Family Medicine)  Yovani Barreto, PT, DPT as Physical Therapist (Physical Therapy)  [facility] (DME Supplier)  BILL Dowell (Nurse Practitioner Family)  BILL Ramirez (Nurse Practitioner Family)  Dale Avelar D.P.M. (Podiatry)        Social History[2]  Family History   Problem Relation Age of Onset    Cancer Mother 40        br ca    Heart Disease Mother     Cancer Father     Cancer Brother 61        brain ca     He  has a past medical history of Apnea, sleep, Arrhythmia (09/02/2021), BPH associated with nocturia, Chickenpox, Frequent urination, [place] measles, High cholesterol, Hypertension, Seizure (HCC), Snoring, and Wears glasses.    He has no past medical history of Restless leg syndrome.   Past Surgical History[3]    Exam:   /60   Pulse 66   Temp 36.9 °C (98.5 °F) (Temporal)   Ht 1.727 m (5' 8\")   Wt 96.7 kg (213 lb 4.7 oz)   SpO2 97%  Body mass index is 32.43 kg/m².    Hearing good.    Dentition good  Alert, oriented in no acute distress.  Eye contact is good, speech goal directed, affect calm    Assessment and Plan. The following treatment and monitoring plan is recommended:    1. Elevated PSA  - PSA TOTAL + %FREE; Future    2. Dry skin    3. Hyperlipidemia, unspecified hyperlipidemia type  - CBC WITHOUT DIFFERENTIAL; Future  - Comp Metabolic Panel; Future  - Lipid Profile; Future    4. Secondary hypertension  - Comp Metabolic Panel; Future  - Lipid Profile; Future    5. Screen for colon cancer  - Cologuard® colon cancer screening    6. Seizure disorder (HCC)    7. Sleep apnea, unspecified type    8. Aortic calcification (HCC)    9. Medicare annual wellness visit, subsequent    10. BPH associated with nocturia    Other orders  - levetiracetam (KEPPRA) 500 MG TABLET SR 24 HR; Take " 500 mg by mouth every day.    Assessment & Plan  1. Elevated PSA  2.  BPH  - PSA level recorded at 4.09 on 12/12/2024, considered elevated  - Repeat PSA test will be ordered  - Urologist requested PSA test for upcoming appointment in 06/2025    3. Dry skin  Chronic and stable condition.  - Likely due to age-related thinning and dryness, exacerbated by sun exposure  - Apply lotion immediately after showering and before sun exposure to maintain skin moisture and resilience  - Informed that dry, thin skin is normal at his age and should use lotion regularly  - Use sunscreen when exposed to the sun for extended periods    4. Seizure disorder  Chronic and stable condition.  Following with neurology  - Currently on Keppra  mg daily, effective without side effects  - Maintain current dosage  - Continue medication     5.  Medicare annual wellness visit, subsequent  - Cologuard test will be ordered for colon cancer screening  - Fasting labs will also be ordered  - Obtain written statement from surgeon indicating that prophylactic antibiotics are not required for dental procedures  - Check with the lab regarding insurance coverage for the PSA test    6. Sleep apnea  Chronic and stable condition.  - Uses CPAP machine for sleep apnea, reports no snoring when mask is properly fitted  - Experiences morning phlegm, attributed to use of moisturizer  - Sleeps with two pillows for elevation, currently has mild throat irritation suspected to be due to allergies  - Over-the-counter Zyrtec recommended for evening use to manage potential allergy symptoms    7.  Aortic calcifications  Chronic and stable condition.  Following with cardiology    8.  Hypertension  Chronic and stable condition.  Blood pressure is well-controlled.  On amlodipine 5 mg and lisinopril 40 mg, continue, no refills needed today    9.  Hyperlipidemia  Chronic and stable condition.  Well-controlled on Lipitor 40 mg, continue    Follow-up  - Patient will follow  up in 6 months     Services suggested: No services needed at this time  Health Care Screening: Age-appropriate preventive services recommended by USPTF and ACIP covered by Medicare were discussed today. Services ordered if indicated and agreed upon by the patient.  Referrals offered: Community-based lifestyle interventions to reduce health risks and promote self-management and wellness, fall prevention, nutrition, physical activity, tobacco-use cessation, weight loss, and mental health services as per orders if indicated.    Discussion today about general wellness and lifestyle habits:    Prevent falls and reduce trip hazards; Cautioned about securing or removing rugs.  Have a working fire alarm and carbon monoxide detector;   Engage in regular physical activity and social activities     Follow-up: No follow-ups on file.          [1]   Current Outpatient Medications   Medication Sig Dispense Refill    levetiracetam (KEPPRA) 500 MG TABLET SR 24 HR Take 500 mg by mouth every day.      atorvastatin (LIPITOR) 40 MG Tab TAKE 1 TABLET BY MOUTH EVERYDAY AT BEDTIME 90 Tablet 1    tamsulosin (FLOMAX) 0.4 MG capsule TAKE 1 CAPSULE BY MOUTH EVERY DAY 90 Capsule 0    amLODIPine (NORVASC) 5 MG Tab TAKE 1 TABLET BY MOUTH EVERY DAY 90 Tablet 0    lisinopril (PRINIVIL) 40 MG tablet TAKE 1 TABLET BY MOUTH EVERY DAY 90 Tablet 0    Cholecalciferol (VITAMIN D-3 PO) Take  by mouth.       No current facility-administered medications for this visit.   [2]   Social History  Tobacco Use    Smoking status: Former     Current packs/day: 0.00     Types: Cigarettes     Start date: 1970     Quit date: 1979     Years since quittin.3     Passive exposure: Never    Smokeless tobacco: Never    Tobacco comments:     0.5 x 5 yrs, stopped at age 26   Vaping Use    Vaping status: Never Used   Substance Use Topics    Alcohol use: Yes     Comment: occ    Drug use: Never   [3]   Past Surgical History:  Procedure Laterality Date     ARTHROPLASTY, KNEE, ROBOT-ASSISTED Right 1/7/2025    Procedure: ROBOTIC RIGHT TOTAL KNEE ARTHROPLASTY;  Surgeon: Jese Stephens M.D.;  Location: SURGERY [facility];  Service: Ortho Robotic    APPENDECTOMY  1990    TONSILLECTOMY

## 2025-06-02 ENCOUNTER — HOSPITAL ENCOUNTER (OUTPATIENT)
Facility: MEDICAL CENTER | Age: 75
End: 2025-06-02
Attending: PHYSICIAN ASSISTANT
Payer: MEDICARE

## 2025-06-02 PROCEDURE — 36415 COLL VENOUS BLD VENIPUNCTURE: CPT

## 2025-06-02 PROCEDURE — 84154 ASSAY OF PSA FREE: CPT

## 2025-06-02 PROCEDURE — 84153 ASSAY OF PSA TOTAL: CPT | Mod: GA

## 2025-06-03 LAB
PSA FREE MFR SERPL: 19 %
PSA FREE SERPL-MCNC: 0.7 NG/ML
PSA SERPL-MCNC: 3.7 NG/ML (ref 0–4)

## 2025-06-06 ENCOUNTER — RESULTS FOLLOW-UP (OUTPATIENT)
Dept: MEDICAL GROUP | Facility: MEDICAL CENTER | Age: 75
End: 2025-06-06

## 2025-06-06 LAB — NONINV COLON CA DNA+OCC BLD SCRN STL QL: NEGATIVE

## 2025-06-26 ENCOUNTER — HOSPITAL ENCOUNTER (OUTPATIENT)
Facility: MEDICAL CENTER | Age: 75
End: 2025-06-26
Attending: NURSE PRACTITIONER
Payer: MEDICARE

## 2025-06-26 DIAGNOSIS — I15.9 SECONDARY HYPERTENSION: ICD-10-CM

## 2025-06-26 DIAGNOSIS — R97.20 ELEVATED PSA: ICD-10-CM

## 2025-06-26 DIAGNOSIS — E78.5 HYPERLIPIDEMIA, UNSPECIFIED HYPERLIPIDEMIA TYPE: ICD-10-CM

## 2025-06-26 LAB
ALBUMIN SERPL BCP-MCNC: 3.9 G/DL (ref 3.2–4.9)
ALBUMIN/GLOB SERPL: 1.5 G/DL
ALP SERPL-CCNC: 147 U/L (ref 30–99)
ALT SERPL-CCNC: 22 U/L (ref 2–50)
ANION GAP SERPL CALC-SCNC: 11 MMOL/L (ref 7–16)
AST SERPL-CCNC: 20 U/L (ref 12–45)
BILIRUB SERPL-MCNC: 0.5 MG/DL (ref 0.1–1.5)
BUN SERPL-MCNC: 25 MG/DL (ref 8–22)
CALCIUM ALBUM COR SERPL-MCNC: 9.3 MG/DL (ref 8.5–10.5)
CALCIUM SERPL-MCNC: 9.2 MG/DL (ref 8.5–10.5)
CHLORIDE SERPL-SCNC: 109 MMOL/L (ref 96–112)
CHOLEST SERPL-MCNC: 128 MG/DL (ref 100–199)
CO2 SERPL-SCNC: 21 MMOL/L (ref 20–33)
CREAT SERPL-MCNC: 0.94 MG/DL (ref 0.5–1.4)
ERYTHROCYTE [DISTWIDTH] IN BLOOD BY AUTOMATED COUNT: 42 FL (ref 35.9–50)
FASTING STATUS PATIENT QL REPORTED: NORMAL
GFR SERPLBLD CREATININE-BSD FMLA CKD-EPI: 85 ML/MIN/1.73 M 2
GLOBULIN SER CALC-MCNC: 2.6 G/DL (ref 1.9–3.5)
GLUCOSE SERPL-MCNC: 102 MG/DL (ref 65–99)
HCT VFR BLD AUTO: 45.7 % (ref 42–52)
HDLC SERPL-MCNC: 38 MG/DL
HGB BLD-MCNC: 15.3 G/DL (ref 14–18)
LDLC SERPL CALC-MCNC: 66 MG/DL
MCH RBC QN AUTO: 29.7 PG (ref 27–33)
MCHC RBC AUTO-ENTMCNC: 33.5 G/DL (ref 32.3–36.5)
MCV RBC AUTO: 88.6 FL (ref 81.4–97.8)
PLATELET # BLD AUTO: 199 K/UL (ref 164–446)
PMV BLD AUTO: 10.9 FL (ref 9–12.9)
POTASSIUM SERPL-SCNC: 4.1 MMOL/L (ref 3.6–5.5)
PROT SERPL-MCNC: 6.5 G/DL (ref 6–8.2)
RBC # BLD AUTO: 5.16 M/UL (ref 4.7–6.1)
SODIUM SERPL-SCNC: 141 MMOL/L (ref 135–145)
TRIGL SERPL-MCNC: 118 MG/DL (ref 0–149)
WBC # BLD AUTO: 5.9 K/UL (ref 4.8–10.8)

## 2025-06-26 PROCEDURE — 84153 ASSAY OF PSA TOTAL: CPT

## 2025-06-26 PROCEDURE — 36415 COLL VENOUS BLD VENIPUNCTURE: CPT

## 2025-06-26 PROCEDURE — 80053 COMPREHEN METABOLIC PANEL: CPT

## 2025-06-26 PROCEDURE — 80061 LIPID PANEL: CPT

## 2025-06-26 PROCEDURE — 84154 ASSAY OF PSA FREE: CPT

## 2025-06-26 PROCEDURE — 85027 COMPLETE CBC AUTOMATED: CPT

## 2025-06-28 LAB
PSA FREE MFR SERPL: 23 %
PSA FREE SERPL-MCNC: 0.8 NG/ML
PSA SERPL-MCNC: 3.5 NG/ML (ref 0–4)

## 2025-07-16 DIAGNOSIS — I15.9 SECONDARY HYPERTENSION: ICD-10-CM

## 2025-07-16 DIAGNOSIS — R56.9 SEIZURES (HCC): Primary | ICD-10-CM

## 2025-07-16 NOTE — TELEPHONE ENCOUNTER
Received request via: Pharmacy    Medication Name/Dosage levetiracetam (KEPPRA) 500 MG TABLET SR 24 HR     When was medication last prescribed 01/21/2025     How many refills were previously provided 1    How many Refills does he patient have left from last prescription 0    Was the patient seen in the last year in this department? Yes   Date of last office visit 08/3/2024     Per last Neurology Office Visit, when was the date of next follow up visit set for?                            Date of office visit follow up request 6 mo     Does the patient have an upcoming appointment? No   If yes, when Sent message to par             If no, schedule appointment sent message to par    Does the patient have intermediate Plus and need 100 day supply (blood pressure, diabetes and cholesterol meds only)? Patient does not have SCP

## 2025-07-17 RX ORDER — AMLODIPINE BESYLATE 5 MG/1
5 TABLET ORAL DAILY
Qty: 90 TABLET | Refills: 3 | Status: SHIPPED | OUTPATIENT
Start: 2025-07-17

## 2025-07-17 RX ORDER — LEVETIRACETAM 500 MG/1
TABLET, FILM COATED, EXTENDED RELEASE ORAL
Qty: 90 TABLET | Refills: 0 | Status: SHIPPED | OUTPATIENT
Start: 2025-07-17

## 2025-07-20 DIAGNOSIS — R35.1 BPH ASSOCIATED WITH NOCTURIA: ICD-10-CM

## 2025-07-20 DIAGNOSIS — N40.1 BPH ASSOCIATED WITH NOCTURIA: ICD-10-CM

## 2025-07-23 DIAGNOSIS — I15.9 SECONDARY HYPERTENSION: ICD-10-CM

## 2025-07-23 RX ORDER — TAMSULOSIN HYDROCHLORIDE 0.4 MG/1
0.4 CAPSULE ORAL DAILY
Qty: 90 CAPSULE | Refills: 3 | Status: SHIPPED | OUTPATIENT
Start: 2025-07-23

## 2025-07-24 RX ORDER — LISINOPRIL 40 MG/1
40 TABLET ORAL DAILY
Qty: 90 TABLET | Refills: 3 | Status: SHIPPED | OUTPATIENT
Start: 2025-07-24

## (undated) DEVICE — Device

## (undated) DEVICE — BLADE SAGITTAL SAW DUAL CUT 25.0 X 90.0 X 1.27MM (1/EA)

## (undated) DEVICE — PAD PREP 24 X 48 CUFFED - (100/CA)

## (undated) DEVICE — SUTURE 5 ETHIBOND V-37 (12PK/BX)

## (undated) DEVICE — PIN FIXATION BONE STERILE 3.2MM X 110MM (2EA/PK)

## (undated) DEVICE — GLOVE BIOGEL SZ 8 SURGICAL PF LTX - (50PR/BX 4BX/CA)

## (undated) DEVICE — SENSOR OXIMETER ADULT SPO2 RD SET (20EA/BX)

## (undated) DEVICE — GLOVE BIOGEL PI ORTHO SZ 7.5 PF LF (40PR/BX)

## (undated) DEVICE — DRAPE LARGE 3 QUARTER - (20/CA)

## (undated) DEVICE — STOCKINETTE IMPERVIOUS 12X48 - STERILELF (10/CA)"

## (undated) DEVICE — SUTURE GENERAL

## (undated) DEVICE — KIT DRAPE RIO ONE PIECE WITH POCKETS(10EA MINIMUM ORDER)  (1EA)

## (undated) DEVICE — HUMID-VENT HEAT AND MOISTURE EXCHANGE- (50/BX)

## (undated) DEVICE — GLOVE BIOGEL PI INDICATOR SZ 8.0 SURGICAL PF LF -(50/BX 4BX/CA)

## (undated) DEVICE — DERMABOND ADVANCED - (12EA/BX)

## (undated) DEVICE — HANDPIECE 10FT INTPLS SCT PLS IRRIGATION HAND CONTROL SET (6/PK)

## (undated) DEVICE — LACTATED RINGERS INJ 1000 ML - (14EA/CA 60CA/PF)

## (undated) DEVICE — SODIUM CHL IRRIGATION 0.9% 1000ML (12EA/CA)

## (undated) DEVICE — BLADE 90X18X1.27MM SAW SAGITTAL

## (undated) DEVICE — SET EXTENSION WITH 2 PORTS (48EA/CA) ***PART #2C8610 IS A SUBSTITUTE*****

## (undated) DEVICE — CANISTER SUCTION RIGID RED 1500CC (40EA/CA)

## (undated) DEVICE — TUBING CLEARLINK DUO-VENT - C-FLO (48EA/CA)

## (undated) DEVICE — SUCTION INSTRUMENT YANKAUER BULBOUS TIP W/O VENT (50EA/CA)

## (undated) DEVICE — BLADE SAGITTAL 34MM

## (undated) DEVICE — SUTURE 3-0 MONOCRYL PLUS PS-1 - 27 INCH (36/BX)

## (undated) DEVICE — SUTURE 2-0 MONOCRYL PLUS UNDYED CT-1 1 X 36 (36EA/BX)"

## (undated) DEVICE — TIP INTPLS HFLO ML ORFC BTRY - (12/CS) FOR SURGILAV

## (undated) DEVICE — PIN FIXATION BONE STERILE 3.2MM X 140MM (2EA/PK)

## (undated) DEVICE — SYSTEM NAVIGATION VIZADISC KNEE PROCEDURE TRACKING KIT (1EA)